# Patient Record
Sex: FEMALE | Race: WHITE | NOT HISPANIC OR LATINO | Employment: OTHER | URBAN - METROPOLITAN AREA
[De-identification: names, ages, dates, MRNs, and addresses within clinical notes are randomized per-mention and may not be internally consistent; named-entity substitution may affect disease eponyms.]

---

## 2023-09-19 ENCOUNTER — TRANSFERRED RECORDS (OUTPATIENT)
Dept: HEALTH INFORMATION MANAGEMENT | Facility: CLINIC | Age: 78
End: 2023-09-19
Payer: MEDICARE

## 2023-09-27 ENCOUNTER — HOSPITAL ENCOUNTER (INPATIENT)
Facility: CLINIC | Age: 78
Setting detail: SURGERY ADMIT
End: 2023-09-27
Attending: OBSTETRICS & GYNECOLOGY | Admitting: OBSTETRICS & GYNECOLOGY
Payer: MEDICARE

## 2023-09-27 PROBLEM — R19.00 PELVIC MASS: Status: ACTIVE | Noted: 2023-09-27

## 2023-09-27 PROBLEM — E11.9 DIABETES MELLITUS TYPE II, CONTROLLED (H): Status: ACTIVE | Noted: 2023-09-27

## 2023-09-27 PROBLEM — F41.9 ANXIETY: Status: ACTIVE | Noted: 2023-09-27

## 2023-09-27 PROBLEM — I10 HTN (HYPERTENSION): Status: ACTIVE | Noted: 2023-09-27

## 2023-09-27 RX ORDER — CEFAZOLIN SODIUM/WATER 2 G/20 ML
2 SYRINGE (ML) INTRAVENOUS SEE ADMIN INSTRUCTIONS
Status: CANCELLED | OUTPATIENT
Start: 2023-09-27

## 2023-09-27 RX ORDER — METRONIDAZOLE 500 MG/100ML
500 INJECTION, SOLUTION INTRAVENOUS ONCE
Status: CANCELLED | OUTPATIENT
Start: 2023-10-30

## 2023-09-27 RX ORDER — CEFAZOLIN SODIUM/WATER 2 G/20 ML
2 SYRINGE (ML) INTRAVENOUS
Status: CANCELLED | OUTPATIENT
Start: 2023-09-27

## 2023-09-27 RX ORDER — ACETAMINOPHEN 325 MG/1
975 TABLET ORAL ONCE
Status: CANCELLED | OUTPATIENT
Start: 2023-09-27 | End: 2023-09-27

## 2023-10-09 ENCOUNTER — MEDICAL CORRESPONDENCE (OUTPATIENT)
Dept: HEALTH INFORMATION MANAGEMENT | Facility: CLINIC | Age: 78
End: 2023-10-09
Payer: MEDICARE

## 2023-10-11 ENCOUNTER — HOSPITAL ENCOUNTER (OUTPATIENT)
Dept: ULTRASOUND IMAGING | Facility: CLINIC | Age: 78
Discharge: HOME | End: 2023-10-11
Attending: OBSTETRICS & GYNECOLOGY | Admitting: OBSTETRICS & GYNECOLOGY
Payer: MEDICARE

## 2023-10-11 DIAGNOSIS — J90 PLEURAL EFFUSION: ICD-10-CM

## 2023-10-11 PROCEDURE — 88189 FLOWCYTOMETRY/READ 16 & >: CPT | Mod: GC | Performed by: PATHOLOGY

## 2023-10-11 PROCEDURE — 88184 FLOWCYTOMETRY/ TC 1 MARKER: CPT | Performed by: OBSTETRICS & GYNECOLOGY

## 2023-10-11 PROCEDURE — 88341 IMHCHEM/IMCYTCHM EA ADD ANTB: CPT | Mod: TC | Performed by: OBSTETRICS & GYNECOLOGY

## 2023-10-11 PROCEDURE — 250N000009 HC RX 250: Performed by: RADIOLOGY

## 2023-10-11 PROCEDURE — 88185 FLOWCYTOMETRY/TC ADD-ON: CPT | Performed by: OBSTETRICS & GYNECOLOGY

## 2023-10-11 PROCEDURE — 88184 FLOWCYTOMETRY/ TC 1 MARKER: CPT | Performed by: PATHOLOGY

## 2023-10-11 PROCEDURE — 88342 IMHCHEM/IMCYTCHM 1ST ANTB: CPT | Mod: TC,XU | Performed by: OBSTETRICS & GYNECOLOGY

## 2023-10-11 PROCEDURE — 32555 ASPIRATE PLEURA W/ IMAGING: CPT

## 2023-10-11 PROCEDURE — 272N000706 US THORACENTESIS

## 2023-10-11 RX ORDER — LIDOCAINE HYDROCHLORIDE 10 MG/ML
10 INJECTION, SOLUTION EPIDURAL; INFILTRATION; INTRACAUDAL; PERINEURAL ONCE
Status: COMPLETED | OUTPATIENT
Start: 2023-10-11 | End: 2023-10-11

## 2023-10-11 RX ADMIN — LIDOCAINE HYDROCHLORIDE 10 ML: 10 INJECTION, SOLUTION EPIDURAL; INFILTRATION; INTRACAUDAL; PERINEURAL at 14:11

## 2023-10-13 PROCEDURE — 88112 CYTOPATH CELL ENHANCE TECH: CPT | Mod: 26

## 2023-10-13 PROCEDURE — 88341 IMHCHEM/IMCYTCHM EA ADD ANTB: CPT | Mod: 26

## 2023-10-13 PROCEDURE — 88305 TISSUE EXAM BY PATHOLOGIST: CPT | Mod: 26

## 2023-10-13 PROCEDURE — 88342 IMHCHEM/IMCYTCHM 1ST ANTB: CPT | Mod: 26

## 2023-10-16 LAB
PATH REPORT.COMMENTS IMP SPEC: NORMAL
PATH REPORT.FINAL DX SPEC: NORMAL
PATH REPORT.MICROSCOPIC SPEC OTHER STN: NORMAL
PATH REPORT.RELEVANT HX SPEC: NORMAL

## 2023-10-17 LAB
PATH REPORT.ADDENDUM SPEC: NORMAL
PATH REPORT.COMMENTS IMP SPEC: NO
PATH REPORT.COMMENTS IMP SPEC: NORMAL
PATH REPORT.COMMENTS IMP SPEC: NORMAL
PATH REPORT.FINAL DX SPEC: NORMAL
PATH REPORT.GROSS SPEC: NORMAL
PATH REPORT.MICROSCOPIC SPEC OTHER STN: NORMAL
PATH REPORT.RELEVANT HX SPEC: NORMAL

## 2023-10-23 ENCOUNTER — TRANSFERRED RECORDS (OUTPATIENT)
Dept: HEALTH INFORMATION MANAGEMENT | Facility: CLINIC | Age: 78
End: 2023-10-23

## 2023-11-17 ENCOUNTER — TRANSFERRED RECORDS (OUTPATIENT)
Dept: HEALTH INFORMATION MANAGEMENT | Facility: CLINIC | Age: 78
End: 2023-11-17
Payer: MEDICARE

## 2023-11-26 ENCOUNTER — ANESTHESIA EVENT (OUTPATIENT)
Dept: SURGERY | Facility: CLINIC | Age: 78
DRG: 742 | End: 2023-11-26
Payer: MEDICARE

## 2023-11-26 ASSESSMENT — COPD QUESTIONNAIRES: COPD: 1

## 2023-11-26 ASSESSMENT — LIFESTYLE VARIABLES: TOBACCO_USE: 1

## 2023-11-26 NOTE — ANESTHESIA PREPROCEDURE EVALUATION
Anesthesia Pre-Procedure Evaluation    Patient: Tammy Osorio   MRN: [id number] : 1945        Procedure : Procedure(s):  EXPLORATORY LAPAROTOMY , BILATERAL SALPINGO OOPHORECTOMY , POSSIBLE HYSTERECTOMY , POSSIBLE STAGING          Past Medical History:   Diagnosis Date    Anxiety     Aortic regurgitation     Ascending aortic aneurysm (H24)     Diabetes (H)     Hypertension     Osteopenia     Pelvic mass     Pulmonary emphysema (H)     Sciatica, unspecified laterality       No past surgical history on file.   Allergies   Allergen Reactions    Sulfa Antibiotics Swelling      Social History     Tobacco Use    Smoking status: Not on file    Smokeless tobacco: Not on file   Substance Use Topics    Alcohol use: Yes     Comment: occassional      Wt Readings from Last 1 Encounters:   No data found for Wt        Anesthesia Evaluation   Pt has had prior anesthetic.     No history of anesthetic complications       ROS/MED HX  ENT/Pulmonary: Comment: H/o supraglottic abscess and respiratory failure in     (+)                tobacco use, Past use,        COPD,              Neurologic: Comment: LBP - h/o sciatica      Cardiovascular: Comment: Ascending aortic aneurysm  H/o thoracoabdominal aortic aneurysm      2023 stress test  IMPRESSION    1. Adequate pharmacologic stress test with regadenoson and low level exercise.    2. The pharmacologic stress ECG was nondiagnostic due to nonspecific ST-T changes at baseline.    3. Left ventricular cavity size was normal (resting  ml).    4. Overall left ventricular systolic function was normal without wall motion abnormalities. The post stress LVEF was calculated to be 59 %.    5. Myocardial perfusion was normal.    6. There were no prior studies available for comparison.     2023 cardiac echo  Final Impressions:    1. Normal left ventricular size, mildly increased wall thickness, normal global systolic function, calculated EF of 62 %.    2. Mildly enlarged left  "atrium.    3. The aortic valve is trileaflet and sclerotic, mild stenosis and severe regurgitation. The aortic valve peak velocity is 2.1 m/s, the peak gradient is 18 mmHg, and the mean gradient is 8 mmHg. The aortic valve area is 1.58 cmÂ  with a dimensionless index of 0.63. The stroke volume index is 42.4 ml/mÂ .    4. The mitral valve is normal, mild to moderate mitral regurgitation.    5. The ascending aorta is dilated with a maximal diameter of 5.7 cm. The distal transverse aortic arch is 3.1 cm in diameter.     (+)  hypertension- -   -  - -                           valvular problems/murmurs type: AI and MR          METS/Exercise Tolerance:     Hematologic: Comments: Hgb 12.9      Musculoskeletal:       GI/Hepatic:       Renal/Genitourinary: Comment: H/o ovarian mass      Endo:     (+)  type II DM,                    Psychiatric/Substance Use:     (+) psychiatric history anxiety       Infectious Disease:       Malignancy:       Other:            Physical Exam    Airway  airway exam normal      Mallampati: II   TM distance: > 3 FB   Neck ROM: full   Mouth opening: > 3 cm    Respiratory Devices and Support         Dental       (+) Minor Abnormalities - some fillings, tiny chips      Cardiovascular          Rhythm and rate: regular and normal     Pulmonary           breath sounds clear to auscultation           OUTSIDE LABS:  CBC: No results found for: \"WBC\", \"HGB\", \"HCT\", \"PLT\"  BMP: No results found for: \"NA\", \"POTASSIUM\", \"CHLORIDE\", \"CO2\", \"BUN\", \"CR\", \"GLC\"  COAGS: No results found for: \"PTT\", \"INR\", \"FIBR\"  POC: No results found for: \"BGM\", \"HCG\", \"HCGS\"  HEPATIC: No results found for: \"ALBUMIN\", \"PROTTOTAL\", \"ALT\", \"AST\", \"GGT\", \"ALKPHOS\", \"BILITOTAL\", \"BILIDIRECT\", \"SEVEN\"  OTHER: No results found for: \"PH\", \"LACT\", \"A1C\", \"ARCHANA\", \"PHOS\", \"MAG\", \"LIPASE\", \"AMYLASE\", \"TSH\", \"T4\", \"T3\", \"CRP\", \"SED\"    Anesthesia Plan    ASA Status:  3    NPO Status:  NPO Appropriate    Anesthesia Type: General.     - " Airway: ETT   Induction: Intravenous, Propofol.   Maintenance: Balanced.   Techniques and Equipment:     - Airway: Video-Laryngoscope       Consents    Anesthesia Plan(s) and associated risks, benefits, and realistic alternatives discussed. Questions answered and patient/representative(s) expressed understanding.     - Discussed:     - Discussed with:  Patient            Postoperative Care    Pain management: Peripheral nerve block (Single Shot), IV analgesics.   PONV prophylaxis: Ondansetron (or other 5HT-3), Dexamethasone or Solumedrol     Comments:    Other Comments: TAP blocks requested by surgeon - patient consented for TAP blocks.            Harris Sanchez MD

## 2023-11-27 ENCOUNTER — ANESTHESIA (OUTPATIENT)
Dept: SURGERY | Facility: CLINIC | Age: 78
DRG: 742 | End: 2023-11-27
Payer: MEDICARE

## 2023-11-27 ENCOUNTER — HOSPITAL ENCOUNTER (INPATIENT)
Facility: CLINIC | Age: 78
LOS: 5 days | Discharge: HOME HEALTH | DRG: 742 | End: 2023-12-02
Attending: OBSTETRICS & GYNECOLOGY | Admitting: OBSTETRICS & GYNECOLOGY
Payer: MEDICARE

## 2023-11-27 DIAGNOSIS — R11.0 NAUSEA: ICD-10-CM

## 2023-11-27 DIAGNOSIS — N83.8 OVARIAN MASS, LEFT: Primary | ICD-10-CM

## 2023-11-27 DIAGNOSIS — E11.9 CONTROLLED TYPE 2 DIABETES MELLITUS WITHOUT COMPLICATION, WITHOUT LONG-TERM CURRENT USE OF INSULIN (H): ICD-10-CM

## 2023-11-27 DIAGNOSIS — K21.9 GASTROESOPHAGEAL REFLUX DISEASE WITHOUT ESOPHAGITIS: ICD-10-CM

## 2023-11-27 LAB
CREAT SERPL-MCNC: 0.78 MG/DL (ref 0.51–0.95)
EGFRCR SERPLBLD CKD-EPI 2021: 77 ML/MIN/1.73M2
GLUCOSE BLDC GLUCOMTR-MCNC: 152 MG/DL (ref 70–99)
GLUCOSE SERPL-MCNC: 149 MG/DL (ref 70–99)
POTASSIUM SERPL-SCNC: 4.5 MMOL/L (ref 3.4–5.3)

## 2023-11-27 PROCEDURE — 120N000001 HC R&B MED SURG/OB

## 2023-11-27 PROCEDURE — 82947 ASSAY GLUCOSE BLOOD QUANT: CPT | Performed by: ANESTHESIOLOGY

## 2023-11-27 PROCEDURE — 88112 CYTOPATH CELL ENHANCE TECH: CPT | Mod: TC | Performed by: OBSTETRICS & GYNECOLOGY

## 2023-11-27 PROCEDURE — 250N000009 HC RX 250: Performed by: NURSE ANESTHETIST, CERTIFIED REGISTERED

## 2023-11-27 PROCEDURE — 258N000003 HC RX IP 258 OP 636: Performed by: NURSE ANESTHETIST, CERTIFIED REGISTERED

## 2023-11-27 PROCEDURE — 999N000141 HC STATISTIC PRE-PROCEDURE NURSING ASSESSMENT: Performed by: OBSTETRICS & GYNECOLOGY

## 2023-11-27 PROCEDURE — 272N000001 HC OR GENERAL SUPPLY STERILE: Performed by: OBSTETRICS & GYNECOLOGY

## 2023-11-27 PROCEDURE — 258N000003 HC RX IP 258 OP 636: Performed by: ANESTHESIOLOGY

## 2023-11-27 PROCEDURE — 0UT70ZZ RESECTION OF BILATERAL FALLOPIAN TUBES, OPEN APPROACH: ICD-10-PCS | Performed by: OBSTETRICS & GYNECOLOGY

## 2023-11-27 PROCEDURE — 710N000009 HC RECOVERY PHASE 1, LEVEL 1, PER MIN: Performed by: OBSTETRICS & GYNECOLOGY

## 2023-11-27 PROCEDURE — 36415 COLL VENOUS BLD VENIPUNCTURE: CPT | Performed by: ANESTHESIOLOGY

## 2023-11-27 PROCEDURE — 250N000011 HC RX IP 250 OP 636: Performed by: PHYSICIAN ASSISTANT

## 2023-11-27 PROCEDURE — 370N000017 HC ANESTHESIA TECHNICAL FEE, PER MIN: Performed by: OBSTETRICS & GYNECOLOGY

## 2023-11-27 PROCEDURE — 250N000011 HC RX IP 250 OP 636: Mod: JZ | Performed by: OBSTETRICS & GYNECOLOGY

## 2023-11-27 PROCEDURE — 250N000013 HC RX MED GY IP 250 OP 250 PS 637: Performed by: PHYSICIAN ASSISTANT

## 2023-11-27 PROCEDURE — 250N000013 HC RX MED GY IP 250 OP 250 PS 637: Performed by: OBSTETRICS & GYNECOLOGY

## 2023-11-27 PROCEDURE — C1765 ADHESION BARRIER: HCPCS | Performed by: OBSTETRICS & GYNECOLOGY

## 2023-11-27 PROCEDURE — 3E1M38X IRRIGATION OF PERITONEAL CAVITY USING IRRIGATING SUBSTANCE, PERCUTANEOUS APPROACH, DIAGNOSTIC: ICD-10-PCS | Performed by: OBSTETRICS & GYNECOLOGY

## 2023-11-27 PROCEDURE — 258N000003 HC RX IP 258 OP 636: Performed by: PHYSICIAN ASSISTANT

## 2023-11-27 PROCEDURE — 250N000025 HC SEVOFLURANE, PER MIN: Performed by: OBSTETRICS & GYNECOLOGY

## 2023-11-27 PROCEDURE — 250N000011 HC RX IP 250 OP 636: Mod: JZ | Performed by: NURSE ANESTHETIST, CERTIFIED REGISTERED

## 2023-11-27 PROCEDURE — 250N000009 HC RX 250: Performed by: OBSTETRICS & GYNECOLOGY

## 2023-11-27 PROCEDURE — 250N000011 HC RX IP 250 OP 636: Mod: JZ | Performed by: ANESTHESIOLOGY

## 2023-11-27 PROCEDURE — 250N000011 HC RX IP 250 OP 636: Performed by: OBSTETRICS & GYNECOLOGY

## 2023-11-27 PROCEDURE — 88331 PATH CONSLTJ SURG 1 BLK 1SPC: CPT | Mod: TC | Performed by: OBSTETRICS & GYNECOLOGY

## 2023-11-27 PROCEDURE — 0UT20ZZ RESECTION OF BILATERAL OVARIES, OPEN APPROACH: ICD-10-PCS | Performed by: OBSTETRICS & GYNECOLOGY

## 2023-11-27 PROCEDURE — 84132 ASSAY OF SERUM POTASSIUM: CPT | Performed by: ANESTHESIOLOGY

## 2023-11-27 PROCEDURE — 88305 TISSUE EXAM BY PATHOLOGIST: CPT | Mod: TC | Performed by: OBSTETRICS & GYNECOLOGY

## 2023-11-27 PROCEDURE — 250N000011 HC RX IP 250 OP 636: Performed by: NURSE ANESTHETIST, CERTIFIED REGISTERED

## 2023-11-27 PROCEDURE — 88305 TISSUE EXAM BY PATHOLOGIST: CPT | Mod: 26 | Performed by: STUDENT IN AN ORGANIZED HEALTH CARE EDUCATION/TRAINING PROGRAM

## 2023-11-27 PROCEDURE — 82565 ASSAY OF CREATININE: CPT | Performed by: ANESTHESIOLOGY

## 2023-11-27 PROCEDURE — 88331 PATH CONSLTJ SURG 1 BLK 1SPC: CPT | Mod: 26 | Performed by: PATHOLOGY

## 2023-11-27 PROCEDURE — 360N000076 HC SURGERY LEVEL 3, PER MIN: Performed by: OBSTETRICS & GYNECOLOGY

## 2023-11-27 RX ORDER — AMOXICILLIN 250 MG
1 CAPSULE ORAL 2 TIMES DAILY
Status: DISCONTINUED | OUTPATIENT
Start: 2023-11-27 | End: 2023-12-02 | Stop reason: HOSPADM

## 2023-11-27 RX ORDER — ONDANSETRON 4 MG/1
4 TABLET, ORALLY DISINTEGRATING ORAL EVERY 30 MIN PRN
Status: DISCONTINUED | OUTPATIENT
Start: 2023-11-27 | End: 2023-11-27 | Stop reason: HOSPADM

## 2023-11-27 RX ORDER — PROPOFOL 10 MG/ML
INJECTION, EMULSION INTRAVENOUS PRN
Status: DISCONTINUED | OUTPATIENT
Start: 2023-11-27 | End: 2023-11-27

## 2023-11-27 RX ORDER — METFORMIN HCL 500 MG
1000 TABLET, EXTENDED RELEASE 24 HR ORAL 2 TIMES DAILY WITH MEALS
Status: ON HOLD | COMMUNITY
End: 2023-12-02

## 2023-11-27 RX ORDER — NALOXONE HYDROCHLORIDE 0.4 MG/ML
0.2 INJECTION, SOLUTION INTRAMUSCULAR; INTRAVENOUS; SUBCUTANEOUS
Status: DISCONTINUED | OUTPATIENT
Start: 2023-11-27 | End: 2023-12-02 | Stop reason: HOSPADM

## 2023-11-27 RX ORDER — NICOTINE POLACRILEX 4 MG
15-30 LOZENGE BUCCAL
Status: DISCONTINUED | OUTPATIENT
Start: 2023-11-27 | End: 2023-12-02 | Stop reason: HOSPADM

## 2023-11-27 RX ORDER — LIDOCAINE HYDROCHLORIDE 20 MG/ML
INJECTION, SOLUTION INFILTRATION; PERINEURAL PRN
Status: DISCONTINUED | OUTPATIENT
Start: 2023-11-27 | End: 2023-11-27

## 2023-11-27 RX ORDER — ONDANSETRON 4 MG/1
4 TABLET, ORALLY DISINTEGRATING ORAL EVERY 6 HOURS PRN
Status: DISCONTINUED | OUTPATIENT
Start: 2023-11-27 | End: 2023-12-02 | Stop reason: HOSPADM

## 2023-11-27 RX ORDER — ACETAMINOPHEN 325 MG/1
975 TABLET ORAL EVERY 6 HOURS
Qty: 36 TABLET | Refills: 0 | Status: COMPLETED | OUTPATIENT
Start: 2023-11-28 | End: 2023-11-30

## 2023-11-27 RX ORDER — ONDANSETRON 2 MG/ML
INJECTION INTRAMUSCULAR; INTRAVENOUS PRN
Status: DISCONTINUED | OUTPATIENT
Start: 2023-11-27 | End: 2023-11-27

## 2023-11-27 RX ORDER — ONDANSETRON 2 MG/ML
4 INJECTION INTRAMUSCULAR; INTRAVENOUS EVERY 30 MIN PRN
Status: DISCONTINUED | OUTPATIENT
Start: 2023-11-27 | End: 2023-11-27 | Stop reason: HOSPADM

## 2023-11-27 RX ORDER — METOPROLOL TARTRATE 50 MG
50 TABLET ORAL 2 TIMES DAILY
Status: DISCONTINUED | OUTPATIENT
Start: 2023-11-28 | End: 2023-12-02 | Stop reason: HOSPADM

## 2023-11-27 RX ORDER — PROPOFOL 10 MG/ML
INJECTION, EMULSION INTRAVENOUS CONTINUOUS PRN
Status: DISCONTINUED | OUTPATIENT
Start: 2023-11-27 | End: 2023-11-27

## 2023-11-27 RX ORDER — ENOXAPARIN SODIUM 100 MG/ML
40 INJECTION SUBCUTANEOUS EVERY 24 HOURS
Status: DISCONTINUED | OUTPATIENT
Start: 2023-11-28 | End: 2023-12-01

## 2023-11-27 RX ORDER — SODIUM CHLORIDE, SODIUM LACTATE, POTASSIUM CHLORIDE, CALCIUM CHLORIDE 600; 310; 30; 20 MG/100ML; MG/100ML; MG/100ML; MG/100ML
INJECTION, SOLUTION INTRAVENOUS CONTINUOUS
Status: DISCONTINUED | OUTPATIENT
Start: 2023-11-27 | End: 2023-11-27 | Stop reason: HOSPADM

## 2023-11-27 RX ORDER — HYDROMORPHONE HCL IN WATER/PF 6 MG/30 ML
0.2 PATIENT CONTROLLED ANALGESIA SYRINGE INTRAVENOUS EVERY 5 MIN PRN
Status: DISCONTINUED | OUTPATIENT
Start: 2023-11-27 | End: 2023-11-27 | Stop reason: HOSPADM

## 2023-11-27 RX ORDER — AMLODIPINE BESYLATE 5 MG/1
1 TABLET ORAL
Status: ON HOLD | COMMUNITY
End: 2023-11-27

## 2023-11-27 RX ORDER — MAGNESIUM HYDROXIDE 1200 MG/15ML
LIQUID ORAL PRN
Status: DISCONTINUED | OUTPATIENT
Start: 2023-11-27 | End: 2023-11-27 | Stop reason: HOSPADM

## 2023-11-27 RX ORDER — LIDOCAINE 40 MG/G
CREAM TOPICAL
Status: DISCONTINUED | OUTPATIENT
Start: 2023-11-27 | End: 2023-12-02 | Stop reason: HOSPADM

## 2023-11-27 RX ORDER — CEFAZOLIN SODIUM 1 G/3ML
1 INJECTION, POWDER, FOR SOLUTION INTRAMUSCULAR; INTRAVENOUS EVERY 8 HOURS
Qty: 10 ML | Refills: 0 | Status: COMPLETED | OUTPATIENT
Start: 2023-11-27 | End: 2023-11-28

## 2023-11-27 RX ORDER — SODIUM CHLORIDE 9 MG/ML
INJECTION, SOLUTION INTRAVENOUS CONTINUOUS
Status: DISCONTINUED | OUTPATIENT
Start: 2023-11-27 | End: 2023-11-29

## 2023-11-27 RX ORDER — METRONIDAZOLE 500 MG/100ML
500 INJECTION, SOLUTION INTRAVENOUS EVERY 12 HOURS
Qty: 200 ML | Refills: 0 | Status: COMPLETED | OUTPATIENT
Start: 2023-11-28 | End: 2023-11-28

## 2023-11-27 RX ORDER — NALOXONE HYDROCHLORIDE 0.4 MG/ML
0.4 INJECTION, SOLUTION INTRAMUSCULAR; INTRAVENOUS; SUBCUTANEOUS
Status: DISCONTINUED | OUTPATIENT
Start: 2023-11-27 | End: 2023-12-02 | Stop reason: HOSPADM

## 2023-11-27 RX ORDER — KETOROLAC TROMETHAMINE 15 MG/ML
15 INJECTION, SOLUTION INTRAMUSCULAR; INTRAVENOUS EVERY 6 HOURS
Qty: 20 ML | Refills: 0 | Status: DISCONTINUED | OUTPATIENT
Start: 2023-11-27 | End: 2023-12-01

## 2023-11-27 RX ORDER — FENTANYL CITRATE 50 UG/ML
INJECTION, SOLUTION INTRAMUSCULAR; INTRAVENOUS PRN
Status: DISCONTINUED | OUTPATIENT
Start: 2023-11-27 | End: 2023-11-27

## 2023-11-27 RX ORDER — LISINOPRIL 10 MG/1
10 TABLET ORAL DAILY
Status: ON HOLD | COMMUNITY
End: 2023-11-27

## 2023-11-27 RX ORDER — DEXTROSE MONOHYDRATE 25 G/50ML
25-50 INJECTION, SOLUTION INTRAVENOUS
Status: DISCONTINUED | OUTPATIENT
Start: 2023-11-27 | End: 2023-12-02 | Stop reason: HOSPADM

## 2023-11-27 RX ORDER — LISINOPRIL 20 MG/1
20 TABLET ORAL EVERY EVENING
Status: ON HOLD | COMMUNITY
End: 2023-12-28

## 2023-11-27 RX ORDER — GLYCOPYRROLATE 0.2 MG/ML
INJECTION, SOLUTION INTRAMUSCULAR; INTRAVENOUS PRN
Status: DISCONTINUED | OUTPATIENT
Start: 2023-11-27 | End: 2023-11-27

## 2023-11-27 RX ORDER — CEFAZOLIN SODIUM/WATER 2 G/20 ML
2 SYRINGE (ML) INTRAVENOUS SEE ADMIN INSTRUCTIONS
Status: DISCONTINUED | OUTPATIENT
Start: 2023-11-27 | End: 2023-11-27 | Stop reason: HOSPADM

## 2023-11-27 RX ORDER — ONDANSETRON 2 MG/ML
4 INJECTION INTRAMUSCULAR; INTRAVENOUS EVERY 6 HOURS PRN
Status: DISCONTINUED | OUTPATIENT
Start: 2023-11-27 | End: 2023-12-02 | Stop reason: HOSPADM

## 2023-11-27 RX ORDER — CEFAZOLIN SODIUM/WATER 2 G/20 ML
2 SYRINGE (ML) INTRAVENOUS
Status: COMPLETED | OUTPATIENT
Start: 2023-11-27 | End: 2023-11-27

## 2023-11-27 RX ORDER — HYDROMORPHONE HCL IN WATER/PF 6 MG/30 ML
0.4 PATIENT CONTROLLED ANALGESIA SYRINGE INTRAVENOUS
Status: DISCONTINUED | OUTPATIENT
Start: 2023-11-27 | End: 2023-12-02 | Stop reason: HOSPADM

## 2023-11-27 RX ORDER — CALCIUM CARBONATE 500 MG/1
500 TABLET, CHEWABLE ORAL 4 TIMES DAILY PRN
Status: DISCONTINUED | OUTPATIENT
Start: 2023-11-27 | End: 2023-12-02 | Stop reason: HOSPADM

## 2023-11-27 RX ORDER — ACETAMINOPHEN 325 MG/1
650 TABLET ORAL EVERY 6 HOURS
Status: DISCONTINUED | OUTPATIENT
Start: 2023-12-01 | End: 2023-12-02 | Stop reason: HOSPADM

## 2023-11-27 RX ORDER — AMLODIPINE BESYLATE 5 MG/1
5 TABLET ORAL DAILY
Status: DISCONTINUED | OUTPATIENT
Start: 2023-11-28 | End: 2023-11-28

## 2023-11-27 RX ORDER — PROCHLORPERAZINE MALEATE 5 MG
5 TABLET ORAL EVERY 6 HOURS PRN
Status: DISCONTINUED | OUTPATIENT
Start: 2023-11-27 | End: 2023-12-02 | Stop reason: HOSPADM

## 2023-11-27 RX ORDER — LISINOPRIL 10 MG/1
10 TABLET ORAL EVERY EVENING
Status: DISCONTINUED | OUTPATIENT
Start: 2023-11-28 | End: 2023-11-28

## 2023-11-27 RX ORDER — METRONIDAZOLE 500 MG/100ML
500 INJECTION, SOLUTION INTRAVENOUS ONCE
Status: COMPLETED | OUTPATIENT
Start: 2023-11-27 | End: 2023-11-27

## 2023-11-27 RX ORDER — HYDRALAZINE HYDROCHLORIDE 20 MG/ML
10 INJECTION INTRAMUSCULAR; INTRAVENOUS ONCE
Status: COMPLETED | OUTPATIENT
Start: 2023-11-27 | End: 2023-11-27

## 2023-11-27 RX ORDER — FENTANYL CITRATE 0.05 MG/ML
25 INJECTION, SOLUTION INTRAMUSCULAR; INTRAVENOUS EVERY 5 MIN PRN
Status: DISCONTINUED | OUTPATIENT
Start: 2023-11-27 | End: 2023-11-27 | Stop reason: HOSPADM

## 2023-11-27 RX ORDER — FENTANYL CITRATE 0.05 MG/ML
50 INJECTION, SOLUTION INTRAMUSCULAR; INTRAVENOUS EVERY 5 MIN PRN
Status: DISCONTINUED | OUTPATIENT
Start: 2023-11-27 | End: 2023-11-27 | Stop reason: HOSPADM

## 2023-11-27 RX ORDER — HYDROMORPHONE HCL IN WATER/PF 6 MG/30 ML
0.4 PATIENT CONTROLLED ANALGESIA SYRINGE INTRAVENOUS EVERY 5 MIN PRN
Status: DISCONTINUED | OUTPATIENT
Start: 2023-11-27 | End: 2023-11-27 | Stop reason: HOSPADM

## 2023-11-27 RX ORDER — AMLODIPINE BESYLATE 10 MG/1
10 TABLET ORAL DAILY
COMMUNITY

## 2023-11-27 RX ORDER — METOPROLOL TARTRATE 50 MG
50 TABLET ORAL DAILY
COMMUNITY
Start: 2022-05-03

## 2023-11-27 RX ORDER — ACETAMINOPHEN 325 MG/1
975 TABLET ORAL ONCE
Status: COMPLETED | OUTPATIENT
Start: 2023-11-27 | End: 2023-11-27

## 2023-11-27 RX ORDER — HYDROMORPHONE HCL IN WATER/PF 6 MG/30 ML
0.2 PATIENT CONTROLLED ANALGESIA SYRINGE INTRAVENOUS
Status: DISCONTINUED | OUTPATIENT
Start: 2023-11-27 | End: 2023-12-02 | Stop reason: HOSPADM

## 2023-11-27 RX ADMIN — FENTANYL CITRATE 50 MCG: 50 INJECTION INTRAMUSCULAR; INTRAVENOUS at 15:51

## 2023-11-27 RX ADMIN — PHENYLEPHRINE HYDROCHLORIDE 100 MCG: 10 INJECTION INTRAVENOUS at 15:26

## 2023-11-27 RX ADMIN — FENTANYL CITRATE 50 MCG: 50 INJECTION INTRAMUSCULAR; INTRAVENOUS at 15:20

## 2023-11-27 RX ADMIN — HYDRALAZINE HYDROCHLORIDE 10 MG: 20 INJECTION INTRAMUSCULAR; INTRAVENOUS at 17:13

## 2023-11-27 RX ADMIN — CEFAZOLIN 1 G: 1 INJECTION, POWDER, FOR SOLUTION INTRAMUSCULAR; INTRAVENOUS at 23:00

## 2023-11-27 RX ADMIN — PROPOFOL 100 MG: 10 INJECTION, EMULSION INTRAVENOUS at 15:21

## 2023-11-27 RX ADMIN — GLYCOPYRROLATE 0.2 MG: 0.2 INJECTION, SOLUTION INTRAMUSCULAR; INTRAVENOUS at 15:26

## 2023-11-27 RX ADMIN — SODIUM CHLORIDE, POTASSIUM CHLORIDE, SODIUM LACTATE AND CALCIUM CHLORIDE: 600; 310; 30; 20 INJECTION, SOLUTION INTRAVENOUS at 13:48

## 2023-11-27 RX ADMIN — SODIUM CHLORIDE: 9 INJECTION, SOLUTION INTRAVENOUS at 18:23

## 2023-11-27 RX ADMIN — PHENYLEPHRINE HYDROCHLORIDE 100 MCG: 10 INJECTION INTRAVENOUS at 15:24

## 2023-11-27 RX ADMIN — ACETAMINOPHEN 975 MG: 325 TABLET, FILM COATED ORAL at 12:55

## 2023-11-27 RX ADMIN — ROCURONIUM BROMIDE 20 MG: 50 INJECTION, SOLUTION INTRAVENOUS at 15:41

## 2023-11-27 RX ADMIN — GLYCOPYRROLATE 0.2 MG: 0.2 INJECTION, SOLUTION INTRAMUSCULAR; INTRAVENOUS at 15:24

## 2023-11-27 RX ADMIN — Medication 2 G: at 15:12

## 2023-11-27 RX ADMIN — IBUPROFEN 600 MG: 400 TABLET ORAL at 21:36

## 2023-11-27 RX ADMIN — LIDOCAINE HYDROCHLORIDE 80 MG: 20 INJECTION, SOLUTION INFILTRATION; PERINEURAL at 15:21

## 2023-11-27 RX ADMIN — METRONIDAZOLE 500 MG: 500 INJECTION, SOLUTION INTRAVENOUS at 13:48

## 2023-11-27 RX ADMIN — PROPOFOL 50 MCG/KG/MIN: 10 INJECTION, EMULSION INTRAVENOUS at 15:21

## 2023-11-27 RX ADMIN — SUGAMMADEX 100 MG: 100 INJECTION, SOLUTION INTRAVENOUS at 16:45

## 2023-11-27 RX ADMIN — ROCURONIUM BROMIDE 30 MG: 50 INJECTION, SOLUTION INTRAVENOUS at 15:21

## 2023-11-27 RX ADMIN — PHENYLEPHRINE HYDROCHLORIDE 100 MCG: 10 INJECTION INTRAVENOUS at 15:35

## 2023-11-27 RX ADMIN — DOCUSATE SODIUM 50 MG AND SENNOSIDES 8.6 MG 1 TABLET: 8.6; 5 TABLET, FILM COATED ORAL at 21:36

## 2023-11-27 RX ADMIN — HYDROMORPHONE HYDROCHLORIDE 0.2 MG: 0.2 INJECTION, SOLUTION INTRAMUSCULAR; INTRAVENOUS; SUBCUTANEOUS at 19:04

## 2023-11-27 RX ADMIN — SODIUM CHLORIDE, POTASSIUM CHLORIDE, SODIUM LACTATE AND CALCIUM CHLORIDE: 600; 310; 30; 20 INJECTION, SOLUTION INTRAVENOUS at 15:10

## 2023-11-27 RX ADMIN — ONDANSETRON 4 MG: 2 INJECTION INTRAMUSCULAR; INTRAVENOUS at 16:24

## 2023-11-27 ASSESSMENT — ACTIVITIES OF DAILY LIVING (ADL)
ADLS_ACUITY_SCORE: 18
ADLS_ACUITY_SCORE: 35
ADLS_ACUITY_SCORE: 20
ADLS_ACUITY_SCORE: 18

## 2023-11-27 NOTE — ANESTHESIA POSTPROCEDURE EVALUATION
Patient: Tammy Osorio    Procedure: Procedure(s):  EXPLORATORY LAPAROTOMY , BILATERAL SALPINGO OOPHORECTOMY , PELVIC WASHING       Anesthesia Type:  General    Note:     Postop Pain Control: Uneventful            Sign Out: Well controlled pain   PONV: No   Neuro/Psych: Uneventful            Sign Out: Acceptable/Baseline neuro status   Airway/Respiratory: Uneventful            Sign Out: Acceptable/Baseline resp. status   CV/Hemodynamics: Uneventful            Sign Out: Acceptable CV status; No obvious hypovolemia; No obvious fluid overload   Other NRE: NONE   DID A NON-ROUTINE EVENT OCCUR?            Last vitals:  Vitals Value Taken Time   /57 11/27/23 1745   Temp 36.9  C (98.4  F) 11/27/23 1745   Pulse 79 11/27/23 1752   Resp 25 11/27/23 1752   SpO2 95 % 11/27/23 1752   Vitals shown include unfiled device data.    Electronically Signed By: Mina Baker DO, DO  November 27, 2023  5:55 PM

## 2023-11-27 NOTE — PROCEDURES
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   Procedure: Procedure(s):  EXPLORATORY LAPAROTOMY , BILATERAL SALPINGO OOPHORECTOMY , PELVIC WASHING     Surgeon(s):  Assistant(s):     Anesthesia: Surgeon(s) and Role:     * Naye Scruggs MD - Primary     * Aliza Tellez PA-C - Assisting    General   Estimated blood loss: * No values recorded between 11/27/2023  3:42 PM and 11/27/2023  4:52 PM *    Specimens:  Indications for Procedure: ID Type Source Tests Collected by Time Destination   1 : LEFT OVARY AND FALLOPIAN TUBE Tissue Ovary and Fallopian Tube, Left SURGICAL PATHOLOGY EXAM Naye Scruggs MD 11/27/2023  3:48 PM    2 : RIGHT OVARY AND FALLOPIAN TUBE Tissue Ovary and Fallopian Tube, Right SURGICAL PATHOLOGY EXAM Naye Scruggs MD 11/27/2023  3:53 PM    3 : PELVIC WASHINGS Washings Pelvis NON-GYNECOLOGIC CYTOLOGY Naye Scruggs MD 11/27/2023  3:59 PM         Findings: Large multiloculated tumor of the left ovary with no external excresences.  Frozen section c/w mucinous neoplasm with component of javier tumor - diagnosis deferred to permanent pathology.           Description of procedure:   The patient received broad-spectrum prophylactic antibiotics in the preop area and pneumatic compression stockings were placed on her lower extremities.  She was brought to the operating room and placed in a supine position on the operating room table.  General endotracheal anesthesia was administered in the usual fashion.  Once intubated, she was repositioned in low lithotomy position using well-padded Tristen stirrups.  Her arms were well padded and left on arm boards below her shoulder level.  She was prepped and draped in the usual sterile fashion including insertion of a 16 Serbian Andres catheter into her bladder.  A timeout was conducted  and everyone agreed upon the procedure.  I made a midline vertical incision with a #10 scalpel blade going around the right side of the umbilicus and extending the incision into the infra epigastric area.  This was taken down through a small amount of subcutaneous tissue and fascia with the electrocautery.  The peritoneal cavity was easily opened and the incision was extended superiorly and inferiorly.  She had a predominantly white capsule tumor coming from the left adnexal area.  It was easily moved out of the incision onto the abdominal wall.  I created a space underneath the ovarian vessels.  They were cauterized and divided with the LigaSure device.  We then undercut the peritoneum below the mass towards the uterus.  The proximal fallopian tube and utero-ovarian ligament were also divided with and cauterized with the LigaSure device.  The mass was passed off the table and sent to pathology for frozen section.  On the right side we elevated the right round ligament with a Allis clamp.  We opened up the posterior broad ligament lateral to the ovarian vessels.  I isolated the ovarian vessels at the pelvic brim and undercut the peritoneum towards the uterus.  The ovarian vessels were cauterized and divided with the LigaSure device on the side as were the proximal fallopian tube and utero-ovarian ligament.  The right tube and ovary were passed off the table.  We did suction out a small amount of fluid in the cul-de-sac and then added saline to the pelvis and suction this as well as washings.  We awaited frozen section analysis.  The pathologist felt that this represented a mucinous neoplasm with a component of Javier tumor.  In the representative sample he found no high-grade malignancy present.  I did walk through all of the components of the bowel and found no evidence of bowel neoplasia.  She did have a large lower abdominal aortic aneurysm.  We placed Seprafilm in the pelvis and over the bowel underneath the  incision.  We closed the incision in a mass closure using #1 looped PDS from superior and inferior aspects of the incision and we did a running closure towards the midline where each suture was tied to itself.  The skin was reapproximated with staples.  An island dressing was placed over the incision.  Anesthesia performed a TAP block.  Her anesthesia was reversed, she was extubated, and taken to the recovery room in stable condition.  Estimated blood loss 10 mL    Hui Tellez PA-C was my primary assist for the case she helped with opening and closing of the abdomen.  She also helped with the excision of the adnexal structures.    Naye Scrgugs MD

## 2023-11-27 NOTE — ANESTHESIA CARE TRANSFER NOTE
Patient: aTmmy Osorio    Procedure: Procedure(s):  EXPLORATORY LAPAROTOMY , BILATERAL SALPINGO OOPHORECTOMY , PELVIC WASHING       Diagnosis: Pelvic mass [R19.00]  Diagnosis Additional Information: No value filed.    Anesthesia Type:   General     Note:    Oropharynx: oropharynx clear of all foreign objects  Level of Consciousness: awake  Oxygen Supplementation: face mask  Level of Supplemental Oxygen (L/min / FiO2): 10  Independent Airway: airway patency satisfactory and stable  Dentition: dentition unchanged  Vital Signs Stable: post-procedure vital signs reviewed and stable  Report to RN Given: handoff report given  Patient transferred to: PACU    Handoff Report: Identifed the Patient, Identified the Reponsible Provider, Reviewed the pertinent medical history, Discussed the surgical course, Reviewed Intra-OP anesthesia mangement and issues during anesthesia, Set expectations for post-procedure period and Allowed opportunity for questions and acknowledgement of understanding      Vitals:  Vitals Value Taken Time   BP     Temp     Pulse 65 11/27/23 1657   Resp 17 11/27/23 1657   SpO2 99 % 11/27/23 1657   Vitals shown include unfiled device data.    Electronically Signed By: BRIDGER Davis CRNA  November 27, 2023  4:58 PM

## 2023-11-27 NOTE — ANESTHESIA PROCEDURE NOTES
Airway       Patient location during procedure: OR       Procedure Start/Stop Times: 11/27/2023 3:23 PM  Staff -        Anesthesiologist:  Harris Sanchez MD       CRNA: Silas Harmon APRN CRNA       Performed By: CRNAIndications and Patient Condition       Indications for airway management: ariane-procedural       Induction type:intravenous       Mask difficulty assessment: 1 - vent by mask    Final Airway Details       Final airway type: endotracheal airway       Successful airway: ETT - single  Endotracheal Airway Details        ETT size (mm): 7.0       Successful intubation technique: video laryngoscopy       VL Blade Size: Glidescope 3       Grade View of Cords: 1       Adjucts: stylet       Position: Center       Secured at (cm): 22    Post intubation assessment        Placement verified by: capnometry, equal breath sounds and chest rise        Number of attempts at approach: 1       Secured with: tape       Ease of procedure: easy       Dentition: Intact    Medication(s) Administered   Medication Administration Time: 11/27/2023 3:23 PM

## 2023-11-27 NOTE — BRIEF OP NOTE
[facility]    Brief Operative Note    Pre-operative diagnosis: Pelvic mass [R19.00]  Post-operative diagnosis Left ovarian mass    Procedure: EXPLORATORY LAPAROTOMY , BILATERAL SALPINGO OOPHORECTOMY , PELVIC WASHING, Bilateral - Abdomen    Surgeon: Surgeon(s) and Role:     * Naye Scruggs MD - Primary     * Aliza Tellez PA-C - Assisting  Anesthesia: General   Estimated Blood Loss: Less than 10 ml    Drains: None  Specimens:   ID Type Source Tests Collected by Time Destination   1 : LEFT OVARY AND FALLOPIAN TUBE Tissue Ovary and Fallopian Tube, Left SURGICAL PATHOLOGY EXAM Naye Scruggs MD 11/27/2023  3:48 PM    2 : RIGHT OVARY AND FALLOPIAN TUBE Tissue Ovary and Fallopian Tube, Right SURGICAL PATHOLOGY EXAM Naye Scruggs MD 11/27/2023  3:53 PM    3 : PELVIC WASHINGS Washings Pelvis NON-GYNECOLOGIC CYTOLOGY Naye Scruggs MD 11/27/2023  3:59 PM      Findings:   Large cystic left ovarian mass, no evidence of metastatic disease, small and large bowel without abnormality.  .  Complications: None.  Implants: * No implants in log *

## 2023-11-28 LAB
ANION GAP SERPL CALCULATED.3IONS-SCNC: 9 MMOL/L (ref 7–15)
BUN SERPL-MCNC: 18.3 MG/DL (ref 8–23)
CALCIUM SERPL-MCNC: 8.7 MG/DL (ref 8.8–10.2)
CHLORIDE SERPL-SCNC: 101 MMOL/L (ref 98–107)
CREAT SERPL-MCNC: 0.75 MG/DL (ref 0.51–0.95)
DEPRECATED HCO3 PLAS-SCNC: 24 MMOL/L (ref 22–29)
EGFRCR SERPLBLD CKD-EPI 2021: 81 ML/MIN/1.73M2
ERYTHROCYTE [DISTWIDTH] IN BLOOD BY AUTOMATED COUNT: 13.6 % (ref 10–15)
GLUCOSE BLDC GLUCOMTR-MCNC: 132 MG/DL (ref 70–99)
GLUCOSE BLDC GLUCOMTR-MCNC: 138 MG/DL (ref 70–99)
GLUCOSE BLDC GLUCOMTR-MCNC: 154 MG/DL (ref 70–99)
GLUCOSE BLDC GLUCOMTR-MCNC: 165 MG/DL (ref 70–99)
GLUCOSE SERPL-MCNC: 146 MG/DL (ref 70–99)
HCT VFR BLD AUTO: 38.2 % (ref 35–47)
HGB BLD-MCNC: 12.5 G/DL (ref 11.7–15.7)
MCH RBC QN AUTO: 30.6 PG (ref 26.5–33)
MCHC RBC AUTO-ENTMCNC: 32.7 G/DL (ref 31.5–36.5)
MCV RBC AUTO: 93 FL (ref 78–100)
PLATELET # BLD AUTO: 253 10E3/UL (ref 150–450)
POTASSIUM SERPL-SCNC: 4.5 MMOL/L (ref 3.4–5.3)
RBC # BLD AUTO: 4.09 10E6/UL (ref 3.8–5.2)
SODIUM SERPL-SCNC: 134 MMOL/L (ref 135–145)
WBC # BLD AUTO: 9.1 10E3/UL (ref 4–11)

## 2023-11-28 PROCEDURE — 99222 1ST HOSP IP/OBS MODERATE 55: CPT | Mod: AI | Performed by: INTERNAL MEDICINE

## 2023-11-28 PROCEDURE — 250N000012 HC RX MED GY IP 250 OP 636 PS 637: Performed by: PHYSICIAN ASSISTANT

## 2023-11-28 PROCEDURE — 82435 ASSAY OF BLOOD CHLORIDE: CPT | Performed by: PHYSICIAN ASSISTANT

## 2023-11-28 PROCEDURE — 258N000003 HC RX IP 258 OP 636: Performed by: PHYSICIAN ASSISTANT

## 2023-11-28 PROCEDURE — 250N000011 HC RX IP 250 OP 636: Mod: JZ | Performed by: PHYSICIAN ASSISTANT

## 2023-11-28 PROCEDURE — 36415 COLL VENOUS BLD VENIPUNCTURE: CPT | Performed by: PHYSICIAN ASSISTANT

## 2023-11-28 PROCEDURE — 250N000013 HC RX MED GY IP 250 OP 250 PS 637: Performed by: PHYSICIAN ASSISTANT

## 2023-11-28 PROCEDURE — 85027 COMPLETE CBC AUTOMATED: CPT | Performed by: PHYSICIAN ASSISTANT

## 2023-11-28 PROCEDURE — 258N000003 HC RX IP 258 OP 636: Performed by: OBSTETRICS & GYNECOLOGY

## 2023-11-28 PROCEDURE — 120N000001 HC R&B MED SURG/OB

## 2023-11-28 PROCEDURE — 250N000013 HC RX MED GY IP 250 OP 250 PS 637: Performed by: OBSTETRICS & GYNECOLOGY

## 2023-11-28 RX ORDER — LISINOPRIL 20 MG/1
20 TABLET ORAL EVERY EVENING
Status: DISCONTINUED | OUTPATIENT
Start: 2023-11-28 | End: 2023-12-02 | Stop reason: HOSPADM

## 2023-11-28 RX ORDER — AMLODIPINE BESYLATE 5 MG/1
10 TABLET ORAL DAILY
Status: DISCONTINUED | OUTPATIENT
Start: 2023-11-29 | End: 2023-12-02 | Stop reason: HOSPADM

## 2023-11-28 RX ORDER — TRAMADOL HYDROCHLORIDE 50 MG/1
25-50 TABLET ORAL EVERY 6 HOURS PRN
Qty: 15 TABLET | Refills: 0 | Status: SHIPPED | OUTPATIENT
Start: 2023-11-28

## 2023-11-28 RX ORDER — CLONIDINE HYDROCHLORIDE 0.1 MG/1
0.1 TABLET ORAL EVERY 6 HOURS PRN
Status: DISCONTINUED | OUTPATIENT
Start: 2023-11-28 | End: 2023-12-02 | Stop reason: HOSPADM

## 2023-11-28 RX ADMIN — ENOXAPARIN SODIUM 40 MG: 40 INJECTION SUBCUTANEOUS at 10:18

## 2023-11-28 RX ADMIN — IBUPROFEN 600 MG: 400 TABLET ORAL at 03:37

## 2023-11-28 RX ADMIN — LISINOPRIL 20 MG: 20 TABLET ORAL at 20:03

## 2023-11-28 RX ADMIN — AMLODIPINE BESYLATE 5 MG: 5 TABLET ORAL at 08:04

## 2023-11-28 RX ADMIN — METOPROLOL TARTRATE 50 MG: 50 TABLET, FILM COATED ORAL at 08:04

## 2023-11-28 RX ADMIN — SODIUM CHLORIDE: 9 INJECTION, SOLUTION INTRAVENOUS at 22:29

## 2023-11-28 RX ADMIN — METOPROLOL TARTRATE 50 MG: 50 TABLET, FILM COATED ORAL at 21:29

## 2023-11-28 RX ADMIN — CEFAZOLIN 1 G: 1 INJECTION, POWDER, FOR SOLUTION INTRAMUSCULAR; INTRAVENOUS at 07:06

## 2023-11-28 RX ADMIN — INSULIN ASPART 1 UNITS: 100 INJECTION, SOLUTION INTRAVENOUS; SUBCUTANEOUS at 13:19

## 2023-11-28 RX ADMIN — SERTRALINE HYDROCHLORIDE 50 MG: 50 TABLET ORAL at 20:03

## 2023-11-28 RX ADMIN — ACETAMINOPHEN 975 MG: 325 TABLET, FILM COATED ORAL at 12:25

## 2023-11-28 RX ADMIN — SODIUM CHLORIDE: 9 INJECTION, SOLUTION INTRAVENOUS at 13:22

## 2023-11-28 RX ADMIN — CALCIUM CARBONATE (ANTACID) CHEW TAB 500 MG 500 MG: 500 CHEW TAB at 21:29

## 2023-11-28 RX ADMIN — IBUPROFEN 600 MG: 400 TABLET ORAL at 21:29

## 2023-11-28 RX ADMIN — DOCUSATE SODIUM 50 MG AND SENNOSIDES 8.6 MG 1 TABLET: 8.6; 5 TABLET, FILM COATED ORAL at 08:04

## 2023-11-28 RX ADMIN — SODIUM CHLORIDE 500 ML: 9 INJECTION, SOLUTION INTRAVENOUS at 10:44

## 2023-11-28 RX ADMIN — ACETAMINOPHEN 975 MG: 325 TABLET, FILM COATED ORAL at 06:14

## 2023-11-28 RX ADMIN — SODIUM CHLORIDE: 9 INJECTION, SOLUTION INTRAVENOUS at 02:58

## 2023-11-28 RX ADMIN — IBUPROFEN 600 MG: 400 TABLET ORAL at 08:03

## 2023-11-28 RX ADMIN — ACETAMINOPHEN 975 MG: 325 TABLET, FILM COATED ORAL at 18:06

## 2023-11-28 RX ADMIN — IBUPROFEN 600 MG: 400 TABLET ORAL at 15:38

## 2023-11-28 RX ADMIN — METRONIDAZOLE 500 MG: 500 INJECTION, SOLUTION INTRAVENOUS at 12:25

## 2023-11-28 RX ADMIN — ACETAMINOPHEN 975 MG: 325 TABLET, FILM COATED ORAL at 00:13

## 2023-11-28 RX ADMIN — DOCUSATE SODIUM 50 MG AND SENNOSIDES 8.6 MG 1 TABLET: 8.6; 5 TABLET, FILM COATED ORAL at 21:30

## 2023-11-28 RX ADMIN — METRONIDAZOLE 500 MG: 500 INJECTION, SOLUTION INTRAVENOUS at 01:00

## 2023-11-28 ASSESSMENT — ACTIVITIES OF DAILY LIVING (ADL)
ADLS_ACUITY_SCORE: 22
ADLS_ACUITY_SCORE: 20
ADLS_ACUITY_SCORE: 22
ADLS_ACUITY_SCORE: 20
ADLS_ACUITY_SCORE: 22
ADLS_ACUITY_SCORE: 20
ADLS_ACUITY_SCORE: 22
ADLS_ACUITY_SCORE: 22
ADLS_ACUITY_SCORE: 20

## 2023-11-28 NOTE — CONSULTS
[facility]    Hospitalist Consultation    Date of Admission:  11/27/2023  Date of Consult (When I saw the patient): 11/28/23    Assessment & Plan   Tammy Osorio is a pleasant 78 year old woman with history of HTN, DM type II, ascending aortic aneurysm, tobacco use, COPD and Left adnexal mass; she was admitted on 11/27/2023 for planned resection of this mass.  The Hospitalist service was asked to see Tammy for assistance with her medical issues.  She underwent exploratory laparotomy, bilateral salpingo-oophorectomy, and pelvic washings and has done fairly well since her surgery.  Has had some decrease in urine output since surgery on 11/27, and PO intake has only been fair today.  Current medical issues:    DM type II; sugars since admission have been stable.  - advance PO diet as able, per Gyn/Onc; return to mod-consistent CHO diet at time of discharge  - agree w/ holding metformin until PO intake adequate and urine output has improved  - continue sliding scale novolog and current monitoring    HTN; pressures elevated at times.  Unclear if she had better BP control prior to admission.  - continue w/ PTA amlodipine 10 mg Q day, lisinopril 20 mg Q day, metoprolol 50 mg BID  - repeat BMP 11/29  - add PRN PO clonidine    Ascending aortic aneurysm; per H & P: 5.5 cm on 1/2014 CT scan -> stable.  - follow up with Dr. Maya (CV Surgeon, [facility]) after discharge    COPD; stable per recent notes.  Tobacco dependence; still smoking 0.5-1 pack per day.  - says she has tried to quit multiple times  - wants to try nicorette gum PRN while here  - monitor    Recent abnormal EKG; no recent cardiac symptoms.  - per reports, had nuclear stress study 11/1/23 that was negative for ischemia.      DVT Prophylaxis: defer to primary service Gyn/Onc  Code Status: Full Code    Disposition: per Gyn/Onc team      TORREY Alonso MD, FACP     [facility] Hospitalist    Reason for Consult   Assistance with  multiple medical issues; thank you for this consult request.    Primary Care Physician /providers  MD Clinton Whitney PA-C    Chief Complaint   See 11/17 H & P; at present: admitted 11/27 for abdominal mass removal.    History was obtained from the patient and from EHR    History of Present Illness   Per 11/17 H & P: admitted 11/27 for elective removal of Left adnexal mass.    Past Medical History    I have reviewed this patient's medical history and updated it with pertinent information if needed.   Past Medical History:   Diagnosis Date    Anxiety     Aortic regurgitation     Ascending aortic aneurysm (H24)     Diabetes (H)     Hypertension     Osteopenia     Pelvic mass     Pulmonary emphysema (H)     Sciatica, unspecified laterality        Past Surgical History   I have reviewed this patient's surgical history and updated it with pertinent information if needed.  Past Surgical History:   Procedure Laterality Date    HYSTERECTOMY TOTAL ABDOMINAL, BILATERAL SALPINGO-OOPHORECTOMY, COMBINED Bilateral 11/27/2023    Procedure: EXPLORATORY LAPAROTOMY , BILATERAL SALPINGO OOPHORECTOMY , PELVIC WASHING;  Surgeon: Naye Scruggs MD;  Location:  OR       Prior to Admission Medications   Prior to Admission Medications   Prescriptions Last Dose Informant Patient Reported? Taking?   amLODIPine (NORVASC) 10 MG tablet 11/27/2023 at am  Yes Yes   Sig: Take 10 mg by mouth daily   lisinopril (ZESTRIL) 20 MG tablet 11/26/2023 at pm  Yes Yes   Sig: Take 20 mg by mouth every evening   metFORMIN (GLUCOPHAGE XR) 500 MG 24 hr tablet 11/26/2023  Yes Yes   Sig: Take 1,000 mg by mouth 2 times daily (with meals)   metoprolol tartrate (LOPRESSOR) 50 MG tablet 11/27/2023 at am  Yes Yes   Sig: Take 50 mg by mouth 2 times daily   sertraline (ZOLOFT) 50 MG tablet 11/26/2023 at pm  Yes Yes   Sig: Take 50 mg by mouth every evening      Facility-Administered Medications: None     Allergies   Allergies   Allergen Reactions     Sulfa Antibiotics Swelling     Swelling of lips       Social History   I have reviewed this patient's social history and updated it with pertinent information if needed. Tammy Osorio  reports that she has been smoking cigarettes. She has never used smokeless tobacco. She reports current alcohol use. She reports that she does not use drugs.  Is ; lives w/ her .  Works in Real Estate.    Family History   I have reviewed this patient's family history and updated it with pertinent information if needed.   No family history on file.    Review of Systems   The 10 point Review of Systems is negative other than noted in the HPI or here.  Mid/lower abdominal pain when coughing.  Appetite fair.  Still fatigued today; has been up walking short distances.  Last BM 11/27 a.m.    Physical Exam   Temp: 98  F (36.7  C) Temp src: Oral BP: (!) 143/47 Pulse: 61   Resp: 16 SpO2: 95 % O2 Device: None (Room air) Oxygen Delivery: 2 LPM  Vital Signs with Ranges  Temp:  [97.4  F (36.3  C)-98.8  F (37.1  C)] 98  F (36.7  C)  Pulse:  [61-79] 61  Resp:  [16-28] 16  BP: (140-179)/(41-66) 143/47  SpO2:  [93 %-100 %] 95 %  116 lbs 1.6 oz    Constitutional: NAD; lying in bed  Eyes: sclerae clear; EOMI  HEENT: MM's moist  Respiratory: fair a/e bilaterally; no wheezing  Cardiovascular: RRR; no m/r/g  GI: abdomen protuberant; + BS; soft.  No focal tenderness or distension.  Skin: vertical dressing over mid-abdomen; several areas of dried drainage.  No odor noted.  Musculoskeletal: no edema; thin  Neurologic: awake, conversant, no focal deficits    Data   Data reviewed today: All pertinent laboratory and imaging results from this encounter were reviewed. I    Recent Labs   Lab 11/28/23  1237 11/28/23  0817 11/28/23  0618 11/27/23  2141 11/27/23  1307   WBC  --  9.1  --   --   --    HGB  --  12.5  --   --   --    MCV  --  93  --   --   --    PLT  --  253  --   --   --    NA  --  134*  --   --   --    POTASSIUM  --  4.5  --   --   4.5   CHLORIDE  --  101  --   --   --    CO2  --  24  --   --   --    BUN  --  18.3  --   --   --    CR  --  0.75  --   --  0.78   ANIONGAP  --  9  --   --   --    ARCHANA  --  8.7*  --   --   --    * 146* 132*   < > 149*    < > = values in this interval not displayed.

## 2023-11-28 NOTE — PROGRESS NOTES
"[facility]  Hospitalist Progress Note          Assessment and Plan:       Left ovarian mass:  POD 1 Xlap, BSO for left ovarian mucinous neoplasm with component of Javier tumor.  Await final pathology.  Pain control good.  On full liquid diet - ADAT.  UO marginal overnight and received IVF bolus.  Will keep mars in another 24 hours and continue IVF - both can be stopped tomorrow if continues to do well.    DVT prophylaxis:  Lovenox subcutaneous in the hospital.  Change to Eliquis 2.5 mg on discharge daily x 25 days.    HTN:  Adjust doses of Amlodopine and Lisinopril    Diabetes mellitus:  Metformin on hold until PO intake better.  Continue to follow glucometer readings.  Hospitalist following.    Disposition:  Anticipated discharge likely Thurs/Friday.               Interval History:     doing well; no cp, sob, n/v, Pain reasonable              Medications:   I have reviewed this patient's current medications               Physical Exam:   Blood pressure (!) 142/43, pulse 74, temperature 97.4  F (36.3  C), temperature source Oral, resp. rate 16, height 1.676 m (5' 6\"), weight 50.2 kg (110 lb 9.6 oz), SpO2 96%.      Vital Sign Ranges  Temperature Temp  Av.3  F (36.8  C)  Min: 97.4  F (36.3  C)  Max: 98.8  F (37.1  C)   Blood pressure Systolic (24hrs), Av , Min:140 , Max:179        Diastolic (24hrs), Av, Min:41, Max:66      Pulse Pulse  Av.3  Min: 64  Max: 79   Respirations Resp  Av.2  Min: 16  Max: 28   Pulse oximetry SpO2  Av.2 %  Min: 93 %  Max: 100 %         Intake/Output Summary (Last 24 hours) at 2023 0838  Last data filed at 2023 0628  Gross per 24 hour   Intake 900 ml   Output 450 ml   Net 450 ml       Lungs:   Diminished BS at bases otherwise clear     Cardiovascular:   normal apical pulses      Abdomen:   Active BS, slightly protuberant and tympany present, dressing with scant dried staining.                Data:   All laboratory data " reviewed

## 2023-11-28 NOTE — PHARMACY-ADMISSION MEDICATION HISTORY
Pharmacist Admission Medication History    Admission medication history is complete. The information provided in this note is only as accurate as the sources available at the time of the update.    Information Source(s): Patient and CareEverywhere/SureScripts via in-person    Pertinent Information: She has taken Vit D and MVI in past but not currently taking any vitamins or supplements    Changes made to PTA medication list:  Added: None  Deleted: None  Changed: amlodipine from 5mg to 10mg, lisinopril 10mg to 20mg    Allergies reviewed with patient and updates made in EHR: yes    Medication History Completed By: Natalia Salas [facility] 11/27/2023 7:51 PM    PTA Med List   Medication Sig Last Dose    amLODIPine (NORVASC) 10 MG tablet Take 10 mg by mouth daily 11/27/2023 at am    lisinopril (ZESTRIL) 20 MG tablet Take 20 mg by mouth every evening 11/26/2023 at pm    metFORMIN (GLUCOPHAGE XR) 500 MG 24 hr tablet Take 1,000 mg by mouth 2 times daily (with meals) 11/26/2023    metoprolol tartrate (LOPRESSOR) 50 MG tablet Take 50 mg by mouth 2 times daily 11/27/2023 at am    sertraline (ZOLOFT) 50 MG tablet Take 50 mg by mouth every evening 11/26/2023 at pm

## 2023-11-28 NOTE — PLAN OF CARE
Orientation: A&Ox4  Activity: SBA  Diet/BS Checks: regular, encourage fluid intake   Tele:  n/a   IV Access/Drains: PIV infusing NS @ 100 ml/hr  Pain Management: scheduled ibuprofen and tylenol  Abnormal VS/Results: VSS  Bowel/Bladder: low UOP this morning, bolus given per orders.   Skin/Wounds: midline incision, drainage marked  Consults:   D/C Disposition: pending  Other Info:

## 2023-11-28 NOTE — PLAN OF CARE
Goal Outcome Evaluation: 1930-0700      VSS on RA ex hypertensive. Mild incisional discomfort controlled with scheduled Tylenol and Ibuprofen. Dangling, stood and walked to door of room. Abdominal binder on to help keep incision intact. Dressing CDI, no new drainage since start of my shift, outlined. Denies nausea. PIV infusing NS @ 100 ml/hr with intermittent antibiotics. Sequentials on. Instruction on IS given. BS stable.

## 2023-11-28 NOTE — PROGRESS NOTES
Notified provider about indwelling mars catheter discussed removal or continued need.    Did provider choose to remove indwelling mars catheter? No    Provider's mars indication for keeping indwelling mars catheter: /GI/GYN Pelvic Procedure .    Is there an order for indwelling mars catheter? Yes    *If there is a plan to keep mars catheter in place at discharge daily notification with provider is not necessary, but please add a notation in the treatment team sticky note that the patient will be discharging with the catheter.

## 2023-11-28 NOTE — PLAN OF CARE
Goal Outcome Evaluation:    On-call gyn onc doc notified through answering service of low urine output, 100 ml in 4 hours @ 0147.     Will give 500 ml 0.9% NS over 1 hour for output of <60 ml/2 hours per post op orders.

## 2023-11-28 NOTE — DISCHARGE SUMMARY
"HOSPITAL DISCHARGE SUMMARY    Patient Name: Tammy Osorio  YOB: 1945 Age: 78 year old  Medical Record Number: [id number]  Primary Physician: Heriberto Jackson  Phone: [phone]  Admission Date: 11/27/2023  Discharge Date: 12/2/2023    Tammy Osorio  will be discharged from [facility] to Home.    PRINCIPAL DISCHARGE DIAGNOSIS: left ovarian mass    BRIEF HOSPITAL COURSE: This 78 year old female admitted following exploratory laparotomy, bilateral salpingo-oophorectomy, pelvic washings. She tolerated the procedure well. She developed a hematoma in the superior aspect of the incision.  Some staples were removed and wound was packed.  Hgb remained stable and no signs of infection. She was discharged to home with home health care POD #5 with adequate pain control, tolerating orals, voiding and ambulating.    PROCEDURES PERFORMED DURING HOSPITALIZATION:   Exploratory laparotomy  Bilateral salpingo-oophorectomy      COMPLICATIONS IN HOSPITAL: None    CONSULTATIONS:  Hospital medicine     PERTINENT FINDINGS/RESULTS AT DISCHARGE:   BP (!) 142/43 (BP Location: Right arm)   Pulse 74   Temp 97.4  F (36.3  C) (Oral)   Resp 16   Ht 1.676 m (5' 6\")   Wt 50.2 kg (110 lb 9.6 oz)   SpO2 96%   BMI 17.85 kg/m      Latest Laboratory Results:  Chem:  CBC RESULTS:   Recent Labs   Lab Test 11/28/23  0817   WBC 9.1   RBC 4.09   HGB 12.5   HCT 38.2   MCV 93   MCH 30.6   MCHC 32.7   RDW 13.6        Last Basic Metabolic Panel:  Lab Results   Component Value Date     11/28/2023      Lab Results   Component Value Date    POTASSIUM 4.5 11/28/2023     Lab Results   Component Value Date    CHLORIDE 101 11/28/2023     Lab Results   Component Value Date    ARCHANA 8.7 11/28/2023     Lab Results   Component Value Date    CO2 24 11/28/2023     Lab Results   Component Value Date    BUN 18.3 11/28/2023     Lab Results   Component Value Date    CR 0.75 11/28/2023     Lab Results   Component Value " Date     11/28/2023     11/28/2023         IMPORTANT PENDING TEST RESULTS:  Pathology    CONDITION AT DISCHARGE:    Stabilized    DISCHARGE ORDERS  Current Discharge Medication List        CONTINUE these medications which have NOT CHANGED    Details   amLODIPine (NORVASC) 10 MG tablet Take 10 mg by mouth daily      lisinopril (ZESTRIL) 20 MG tablet Take 20 mg by mouth every evening      metFORMIN (GLUCOPHAGE XR) 500 MG 24 hr tablet Take 1,000 mg by mouth 2 times daily (with meals)      metoprolol tartrate (LOPRESSOR) 50 MG tablet Take 50 mg by mouth 2 times daily      sertraline (ZOLOFT) 50 MG tablet Take 50 mg by mouth every evening             DISCHARGE INSTRUCTION.  Discharge instructions following your gynecologic procedure:  Returning to work/activities:  -Typically patients can expect to return to light work within 2-4 weeks.  -No driving or operating machinery while taking prescription pain medication.  -You should avoid heavy lifting until your follow up visit. No lifting greater than 20 pounds for 2 weeks.     Diet:  -as tolerated    Pain:  -Motrin (or other NSAIDs) are a good additional therapy and recommend trying this in addition to other pain relievers.  -Typically there is a minimal to moderate amount of incisional pain after surgery.  - An ice pack is recommended intermittently for the first 48 hours to help reduce pain & swelling.  -Most patients are provided a prescription for medication to take on a short term basis to help relieve the discomfort.  -If pain medication refills are needed we require you contact our office during regular business hours. (8am-5pm Monday-Friday)  -Please be aware that pain medication can cause constipation.  It may be recommended to take an over the counter stool softener as directed to prevent constipation.     Constipation:  -The pain medication you are prescribed at the time of your surgery can cause constipation.   -We recommend that you take an over  the counter stool softener such as colace or senokot-s on a regular basis until you have stopped the pain medication or bowel movements are regular. You make take 1-4 tablets twice per day.   -For constipation lasting 3 days please take Milk of Magnesia or Miralax as directed on the bottles. If you have taken Milk of Magnesia and Miralax and still have not had a bowel movement please contact your our office.    Incision/Wound care:  -Your incision is closed with staples; these will be removed at your follow up appointment. You may get the staples wet in the shower and have soap/water rinse over the top then pat dry. You should not bathe/swim/submerge incisions until they are completely healed. It is okay to cover your incision with clean dry gauze or leave uncovered. If using gauze, please change daily. If you noticed redness, swelling, or any unusual drainage from the incision, you may call our office at [phone].      Bladder symptoms:  -You may also have bladder irritation of difficulty starting urination from your surgery and this is normal. Please call if you have pain or burning when you urinate or fevers.     Follow up care:  -You will be asked to see Dr. Scruggs's Nurse Practitioner, Physician Assistant, or RN in 2 weeks and in 8 weeks.   -If you don't already have an appointment, please contact the office and our staff will happily assist you in scheduling your appointment. Bring your insurance card & government issued ID card to your appointment.    CALL YOUR SURGEON @[phone] FOR ANY OF THE FOLLOWING:  -Temperature greater than 101 degrees Fahrenheit  -Increased pain  -Increased shortness of breath  -Pus-like drainage, increasing redness, swelling, tenderness, or warmth at the incision site  -Persistent nausea or vomiting        FOLLOW-UP: Tammy Osorio should see Heriberto Jackson.    Specialty follow-up: as above.     AFTER HOSPITAL RECOMMENDATIONS  As above.      Physician(s) in addition  to primary physician who should receive a copy:  Heriberto Jackson PA-C

## 2023-11-29 LAB
ANION GAP SERPL CALCULATED.3IONS-SCNC: 9 MMOL/L (ref 7–15)
BUN SERPL-MCNC: 14.6 MG/DL (ref 8–23)
CALCIUM SERPL-MCNC: 8.6 MG/DL (ref 8.8–10.2)
CHLORIDE SERPL-SCNC: 99 MMOL/L (ref 98–107)
CREAT SERPL-MCNC: 0.58 MG/DL (ref 0.51–0.95)
DEPRECATED HCO3 PLAS-SCNC: 22 MMOL/L (ref 22–29)
EGFRCR SERPLBLD CKD-EPI 2021: >90 ML/MIN/1.73M2
GLUCOSE BLDC GLUCOMTR-MCNC: 137 MG/DL (ref 70–99)
GLUCOSE BLDC GLUCOMTR-MCNC: 148 MG/DL (ref 70–99)
GLUCOSE BLDC GLUCOMTR-MCNC: 163 MG/DL (ref 70–99)
GLUCOSE BLDC GLUCOMTR-MCNC: 168 MG/DL (ref 70–99)
GLUCOSE SERPL-MCNC: 159 MG/DL (ref 70–99)
HGB BLD-MCNC: 12.6 G/DL (ref 11.7–15.7)
OSMOLALITY SERPL: 275 MMOL/KG (ref 280–301)
OSMOLALITY UR: 674 MMOL/KG (ref 100–1200)
PATH REPORT.COMMENTS IMP SPEC: NORMAL
PATH REPORT.COMMENTS IMP SPEC: NORMAL
PATH REPORT.FINAL DX SPEC: NORMAL
PATH REPORT.GROSS SPEC: NORMAL
PATH REPORT.INTRAOP OBS SPEC DOC: NORMAL
PATH REPORT.MICROSCOPIC SPEC OTHER STN: NORMAL
PATH REPORT.RELEVANT HX SPEC: NORMAL
PHOTO IMAGE: NORMAL
POTASSIUM SERPL-SCNC: 4.8 MMOL/L (ref 3.4–5.3)
SODIUM SERPL-SCNC: 129 MMOL/L (ref 135–145)
SODIUM SERPL-SCNC: 130 MMOL/L (ref 135–145)
SODIUM UR-SCNC: 64 MMOL/L

## 2023-11-29 PROCEDURE — 85018 HEMOGLOBIN: CPT | Performed by: PHYSICIAN ASSISTANT

## 2023-11-29 PROCEDURE — 250N000011 HC RX IP 250 OP 636: Mod: JZ | Performed by: PHYSICIAN ASSISTANT

## 2023-11-29 PROCEDURE — 83935 ASSAY OF URINE OSMOLALITY: CPT | Performed by: HOSPITALIST

## 2023-11-29 PROCEDURE — 250N000013 HC RX MED GY IP 250 OP 250 PS 637: Performed by: PHYSICIAN ASSISTANT

## 2023-11-29 PROCEDURE — 83930 ASSAY OF BLOOD OSMOLALITY: CPT | Performed by: HOSPITALIST

## 2023-11-29 PROCEDURE — 84295 ASSAY OF SERUM SODIUM: CPT | Performed by: HOSPITALIST

## 2023-11-29 PROCEDURE — 250N000012 HC RX MED GY IP 250 OP 636 PS 637: Performed by: PHYSICIAN ASSISTANT

## 2023-11-29 PROCEDURE — 36415 COLL VENOUS BLD VENIPUNCTURE: CPT | Performed by: HOSPITALIST

## 2023-11-29 PROCEDURE — 120N000001 HC R&B MED SURG/OB

## 2023-11-29 PROCEDURE — 250N000013 HC RX MED GY IP 250 OP 250 PS 637: Performed by: INTERNAL MEDICINE

## 2023-11-29 PROCEDURE — 250N000013 HC RX MED GY IP 250 OP 250 PS 637: Performed by: OBSTETRICS & GYNECOLOGY

## 2023-11-29 PROCEDURE — 84300 ASSAY OF URINE SODIUM: CPT | Performed by: HOSPITALIST

## 2023-11-29 PROCEDURE — 82310 ASSAY OF CALCIUM: CPT | Performed by: PHYSICIAN ASSISTANT

## 2023-11-29 PROCEDURE — 99232 SBSQ HOSP IP/OBS MODERATE 35: CPT | Performed by: HOSPITALIST

## 2023-11-29 PROCEDURE — 36415 COLL VENOUS BLD VENIPUNCTURE: CPT | Performed by: PHYSICIAN ASSISTANT

## 2023-11-29 RX ADMIN — CALCIUM CARBONATE (ANTACID) CHEW TAB 500 MG 500 MG: 500 CHEW TAB at 20:41

## 2023-11-29 RX ADMIN — CLONIDINE HYDROCHLORIDE 0.1 MG: 0.1 TABLET ORAL at 00:23

## 2023-11-29 RX ADMIN — ACETAMINOPHEN 975 MG: 325 TABLET, FILM COATED ORAL at 12:20

## 2023-11-29 RX ADMIN — ENOXAPARIN SODIUM 40 MG: 40 INJECTION SUBCUTANEOUS at 10:22

## 2023-11-29 RX ADMIN — AMLODIPINE BESYLATE 10 MG: 5 TABLET ORAL at 09:01

## 2023-11-29 RX ADMIN — LISINOPRIL 20 MG: 20 TABLET ORAL at 20:41

## 2023-11-29 RX ADMIN — IBUPROFEN 600 MG: 400 TABLET ORAL at 16:10

## 2023-11-29 RX ADMIN — IBUPROFEN 600 MG: 400 TABLET ORAL at 03:37

## 2023-11-29 RX ADMIN — ACETAMINOPHEN 975 MG: 325 TABLET, FILM COATED ORAL at 00:20

## 2023-11-29 RX ADMIN — ACETAMINOPHEN 975 MG: 325 TABLET, FILM COATED ORAL at 18:21

## 2023-11-29 RX ADMIN — INSULIN ASPART 1 UNITS: 100 INJECTION, SOLUTION INTRAVENOUS; SUBCUTANEOUS at 19:23

## 2023-11-29 RX ADMIN — DOCUSATE SODIUM 50 MG AND SENNOSIDES 8.6 MG 1 TABLET: 8.6; 5 TABLET, FILM COATED ORAL at 09:01

## 2023-11-29 RX ADMIN — ACETAMINOPHEN 975 MG: 325 TABLET, FILM COATED ORAL at 06:17

## 2023-11-29 RX ADMIN — DOCUSATE SODIUM 50 MG AND SENNOSIDES 8.6 MG 1 TABLET: 8.6; 5 TABLET, FILM COATED ORAL at 20:41

## 2023-11-29 RX ADMIN — ONDANSETRON 4 MG: 4 TABLET, ORALLY DISINTEGRATING ORAL at 23:06

## 2023-11-29 RX ADMIN — IBUPROFEN 600 MG: 400 TABLET ORAL at 10:21

## 2023-11-29 RX ADMIN — METOPROLOL TARTRATE 50 MG: 50 TABLET, FILM COATED ORAL at 09:01

## 2023-11-29 RX ADMIN — ONDANSETRON 4 MG: 4 TABLET, ORALLY DISINTEGRATING ORAL at 16:10

## 2023-11-29 RX ADMIN — IBUPROFEN 600 MG: 400 TABLET ORAL at 20:41

## 2023-11-29 RX ADMIN — SERTRALINE HYDROCHLORIDE 50 MG: 50 TABLET ORAL at 20:41

## 2023-11-29 RX ADMIN — METOPROLOL TARTRATE 50 MG: 50 TABLET, FILM COATED ORAL at 20:40

## 2023-11-29 RX ADMIN — CALCIUM CARBONATE (ANTACID) CHEW TAB 500 MG 500 MG: 500 CHEW TAB at 16:22

## 2023-11-29 ASSESSMENT — ACTIVITIES OF DAILY LIVING (ADL)
ADLS_ACUITY_SCORE: 20
ADLS_ACUITY_SCORE: 20
ADLS_ACUITY_SCORE: 22
ADLS_ACUITY_SCORE: 22
ADLS_ACUITY_SCORE: 20
ADLS_ACUITY_SCORE: 22
ADLS_ACUITY_SCORE: 20
ADLS_ACUITY_SCORE: 22
ADLS_ACUITY_SCORE: 20

## 2023-11-29 NOTE — PROGRESS NOTES
[facility]    Medicine Progress Note - Hospitalist Service    Date of Admission:  11/27/2023    Assessment & Plan   Tammy Osorio is a 78 year old female admitted on 11/27/2023.   history of HTN, DM type II, ascending aortic aneurysm, tobacco use, COPD and Left adnexal mass; she was admitted on 11/27/2023 for planned resection of this mass.  Hospitalist consulted for medical co-management.      Left adnexal mass s/p ex lap, bilateral salpingo-oophorectomy, and pelvic washings 11/27  - post-op management per Gyn/Onc     Hyponatremia  *Na 134 on 11/28  - down to 130 on 11/29; agree with stopping NS  - etiology unclear - appears euvolemic - possibly low solute load (very minimal intake since surgery) vs SIADH related to pain (though denies severe pain)  - repeat Na this afternoon with osmolality; check urine Na and osm  - BMP in AM    DM type II  No recent A1C  - PTA metformin on hold  - start prandial insulin  1/15g carb  - low dose ssi     HTN  - continue PTA amlodipine, lisinopril, metoprolol  - pressures remain moderately elevated - ?pain     COPD  *not on maintenance inhalers    Tobacco dependence  *smoking 0.5-1 pack per day.  - nicorette gum PRN      Recent abnormal EKG  *no recent cardiac symptoms.  *per reports, had nuclear stress study 11/1/23 that was negative for ischemia.    Ascending aortic aneurysm  *per H&P: 5.5 cm on 1/2014 CT scan -> stable.  - follow up with Dr. Maya (CV Surgeon, [facility]) as per routine          Diet: Diet  Advance Diet as Tolerated: Low Fiber    DVT Prophylaxis: Defer to primary service  Andres Catheter: Not present  Lines: None     Cardiac Monitoring: None  Code Status: Full Code      Clinically Significant Risk Factors                  # Hypertension: Noted on problem list                   Disposition Plan      Expected Discharge Date: 11/30/2023      Destination: home with family              Shane Chan MD  Hospitalist Service  [facility]  [facility]  Securely message with [employer] (more info)  Text page via [employer] Paging/Directory   ______________________________________________________________________    Interval History   Doing well, though reports poor appetite.  Has had minimal intake since surgery.  Reports dry mouth.  No edema or dyspnea.  No other complaints.     Physical Exam   Vital Signs: Temp: 98.5  F (36.9  C) Temp src: Oral BP: (!) 154/47 Pulse: 71   Resp: 18 SpO2: 93 % O2 Device: None (Room air)    Weight: 118 lbs 8 oz    General Appearance: Thin female in NAD  Respiratory: lungs CTAB  Cardiovascular: RRR, normal s1/s2 without murmur  GI:   Skin: no edema   Other: Alert and appropriate, CN grossly intact      Medical Decision Making       35 MINUTES SPENT BY ME on the date of service doing chart review, history, exam, documentation & further activities per the note.      Data     I have personally reviewed the following data over the past 24 hrs:    N/A  \   12.6   / N/A     130 (L) 99 14.6 /  163 (H)   4.8 22 0.58 \

## 2023-11-29 NOTE — PLAN OF CARE
Goal Outcome Evaluation: 1930-0700    Orientation: A&Ox4  Activity: SBA  Diet/BS Checks: regular, encourage fluid intake.  this am.    Tele:  n/a   IV Access/Drains: PIV infusing NS @ 100 ml/hr  Pain Management: scheduled ibuprofen and tylenol for mild discomfort. Abdominal binder also in place.  Abnormal VS/Results: VSS ex hypertensive. PRN Clonidine given x1 for SBP>160.   Bowel/Bladder: Andres remains patent.   Skin/Wounds: midline incision, drainage marked  Consults:   D/C Disposition: pending- likely Thursday or Friday  Other Info:

## 2023-11-29 NOTE — PROGRESS NOTES
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  Await final pathology.  Pain control good.  Advancing diet to regular, tolerating well. Andres out today, IV d'ryan.  Urinating well, passing gas.     DVT prophylaxis:  Lovenox subcutaneous in the hospital.  Change to Eliquis 2.5 mg on discharge daily x 25 days.    HTN:  Adjust doses of Amlodopine and Lisinopril    Diabetes mellitus:  Metformin on hold until PO intake better.  Continue to follow glucometer readings.  Hospitalist following.    Disposition:  Anticipated discharge likely Thurs/Friday.    Florecita Patrick, BRIDGER   [facility]  [phone]               Interval History:   Sitting in chair, only mild nausea, pain well controlled.  Passing gas.              Medications:   I have reviewed this patient's current medications               Physical Exam:   Blood pressure (!) 154/47, pulse 71, temperature 98.5  F (36.9  C), temperature source Oral, resp. rate 18, height 1.676 m (5' 6\"), weight 53.8 kg (118 lb 8 oz), SpO2 93%.      Vital Sign Ranges  Temperature Temp  Av.3  F (36.8  C)  Min: 97.4  F (36.3  C)  Max: 98.8  F (37.1  C)   Blood pressure Systolic (24hrs), Av , Min:140 , Max:179        Diastolic (24hrs), Av, Min:41, Max:66      Pulse Pulse  Av.3  Min: 64  Max: 79   Respirations Resp  Av.2  Min: 16  Max: 28   Pulse oximetry SpO2  Av.2 %  Min: 93 %  Max: 100 %         Intake/Output Summary (Last 24 hours) at 2023 0809  Last data filed at 2023 0628  Gross per 24 hour   Intake 900 ml   Output 450 ml   Net 450 ml       Lungs:   Diminished BS at bases otherwise clear     Cardiovascular:   normal apical pulses      Abdomen:   Active BS, slightly protuberant but soft.  Dressing removed, staples intact, suture line c/d. No oozing or redness.                     Data: "   All laboratory data reviewed

## 2023-11-30 LAB
ANION GAP SERPL CALCULATED.3IONS-SCNC: 9 MMOL/L (ref 7–15)
BUN SERPL-MCNC: 21.9 MG/DL (ref 8–23)
CALCIUM SERPL-MCNC: 8.7 MG/DL (ref 8.8–10.2)
CHLORIDE SERPL-SCNC: 94 MMOL/L (ref 98–107)
CREAT SERPL-MCNC: 0.63 MG/DL (ref 0.51–0.95)
DEPRECATED HCO3 PLAS-SCNC: 23 MMOL/L (ref 22–29)
EGFRCR SERPLBLD CKD-EPI 2021: 90 ML/MIN/1.73M2
GLUCOSE BLDC GLUCOMTR-MCNC: 162 MG/DL (ref 70–99)
GLUCOSE BLDC GLUCOMTR-MCNC: 183 MG/DL (ref 70–99)
GLUCOSE BLDC GLUCOMTR-MCNC: 210 MG/DL (ref 70–99)
GLUCOSE BLDC GLUCOMTR-MCNC: 228 MG/DL (ref 70–99)
GLUCOSE SERPL-MCNC: 202 MG/DL (ref 70–99)
PATH REPORT.COMMENTS IMP SPEC: NORMAL
PATH REPORT.COMMENTS IMP SPEC: NORMAL
PATH REPORT.FINAL DX SPEC: NORMAL
PATH REPORT.GROSS SPEC: NORMAL
PATH REPORT.MICROSCOPIC SPEC OTHER STN: NORMAL
PATH REPORT.RELEVANT HX SPEC: NORMAL
PLATELET # BLD AUTO: 322 10E3/UL (ref 150–450)
POTASSIUM SERPL-SCNC: 4.1 MMOL/L (ref 3.4–5.3)
SODIUM SERPL-SCNC: 126 MMOL/L (ref 135–145)
SODIUM SERPL-SCNC: 126 MMOL/L (ref 135–145)
SODIUM SERPL-SCNC: 128 MMOL/L (ref 135–145)

## 2023-11-30 PROCEDURE — 88112 CYTOPATH CELL ENHANCE TECH: CPT | Mod: 26

## 2023-11-30 PROCEDURE — 85049 AUTOMATED PLATELET COUNT: CPT | Performed by: PHYSICIAN ASSISTANT

## 2023-11-30 PROCEDURE — 36415 COLL VENOUS BLD VENIPUNCTURE: CPT | Performed by: HOSPITALIST

## 2023-11-30 PROCEDURE — 250N000013 HC RX MED GY IP 250 OP 250 PS 637: Performed by: PHYSICIAN ASSISTANT

## 2023-11-30 PROCEDURE — 250N000013 HC RX MED GY IP 250 OP 250 PS 637: Performed by: OBSTETRICS & GYNECOLOGY

## 2023-11-30 PROCEDURE — 250N000011 HC RX IP 250 OP 636: Mod: JZ | Performed by: PHYSICIAN ASSISTANT

## 2023-11-30 PROCEDURE — 99232 SBSQ HOSP IP/OBS MODERATE 35: CPT | Performed by: HOSPITALIST

## 2023-11-30 PROCEDURE — 84295 ASSAY OF SERUM SODIUM: CPT | Performed by: HOSPITALIST

## 2023-11-30 PROCEDURE — 250N000013 HC RX MED GY IP 250 OP 250 PS 637: Performed by: INTERNAL MEDICINE

## 2023-11-30 PROCEDURE — 36415 COLL VENOUS BLD VENIPUNCTURE: CPT | Performed by: PHYSICIAN ASSISTANT

## 2023-11-30 PROCEDURE — 88305 TISSUE EXAM BY PATHOLOGIST: CPT | Mod: 26

## 2023-11-30 PROCEDURE — 82374 ASSAY BLOOD CARBON DIOXIDE: CPT | Performed by: HOSPITALIST

## 2023-11-30 PROCEDURE — 120N000001 HC R&B MED SURG/OB

## 2023-11-30 PROCEDURE — 82947 ASSAY GLUCOSE BLOOD QUANT: CPT | Performed by: HOSPITALIST

## 2023-11-30 PROCEDURE — 82435 ASSAY OF BLOOD CHLORIDE: CPT | Performed by: HOSPITALIST

## 2023-11-30 RX ORDER — ACETAMINOPHEN 325 MG/1
975 TABLET ORAL ONCE
Status: COMPLETED | OUTPATIENT
Start: 2023-11-30 | End: 2023-11-30

## 2023-11-30 RX ORDER — SIMETHICONE 80 MG
80 TABLET,CHEWABLE ORAL EVERY 6 HOURS PRN
Status: DISCONTINUED | OUTPATIENT
Start: 2023-11-30 | End: 2023-12-02 | Stop reason: HOSPADM

## 2023-11-30 RX ADMIN — METOPROLOL TARTRATE 50 MG: 50 TABLET, FILM COATED ORAL at 21:19

## 2023-11-30 RX ADMIN — LISINOPRIL 20 MG: 20 TABLET ORAL at 21:19

## 2023-11-30 RX ADMIN — DOCUSATE SODIUM 50 MG AND SENNOSIDES 8.6 MG 1 TABLET: 8.6; 5 TABLET, FILM COATED ORAL at 21:19

## 2023-11-30 RX ADMIN — METOPROLOL TARTRATE 50 MG: 50 TABLET, FILM COATED ORAL at 08:48

## 2023-11-30 RX ADMIN — ACETAMINOPHEN 975 MG: 325 TABLET, FILM COATED ORAL at 19:39

## 2023-11-30 RX ADMIN — PROCHLORPERAZINE EDISYLATE 5 MG: 5 INJECTION INTRAMUSCULAR; INTRAVENOUS at 00:22

## 2023-11-30 RX ADMIN — AMLODIPINE BESYLATE 10 MG: 5 TABLET ORAL at 08:48

## 2023-11-30 RX ADMIN — IBUPROFEN 600 MG: 400 TABLET ORAL at 08:46

## 2023-11-30 RX ADMIN — SIMETHICONE 80 MG: 80 TABLET, CHEWABLE ORAL at 00:47

## 2023-11-30 RX ADMIN — ACETAMINOPHEN 650 MG: 325 TABLET, FILM COATED ORAL at 23:27

## 2023-11-30 RX ADMIN — ACETAMINOPHEN 975 MG: 325 TABLET, FILM COATED ORAL at 00:07

## 2023-11-30 RX ADMIN — DOCUSATE SODIUM 50 MG AND SENNOSIDES 8.6 MG 1 TABLET: 8.6; 5 TABLET, FILM COATED ORAL at 08:47

## 2023-11-30 RX ADMIN — CLONIDINE HYDROCHLORIDE 0.1 MG: 0.1 TABLET ORAL at 23:39

## 2023-11-30 RX ADMIN — ACETAMINOPHEN 975 MG: 325 TABLET, FILM COATED ORAL at 12:25

## 2023-11-30 RX ADMIN — KETOROLAC TROMETHAMINE 15 MG: 15 INJECTION, SOLUTION INTRAMUSCULAR; INTRAVENOUS at 03:10

## 2023-11-30 RX ADMIN — ONDANSETRON 4 MG: 2 INJECTION INTRAMUSCULAR; INTRAVENOUS at 05:07

## 2023-11-30 RX ADMIN — ENOXAPARIN SODIUM 40 MG: 40 INJECTION SUBCUTANEOUS at 10:57

## 2023-11-30 RX ADMIN — INSULIN ASPART 1 UNITS: 100 INJECTION, SOLUTION INTRAVENOUS; SUBCUTANEOUS at 14:16

## 2023-11-30 ASSESSMENT — ACTIVITIES OF DAILY LIVING (ADL)
ADLS_ACUITY_SCORE: 20
ADLS_ACUITY_SCORE: 22
ADLS_ACUITY_SCORE: 22
DEPENDENT_IADLS:: INDEPENDENT
ADLS_ACUITY_SCORE: 22

## 2023-11-30 NOTE — PLAN OF CARE
Goal Outcome Evaluation:         Orientation: AOx4  Activity: SBA, GB/W, Ambulated 2 hallway  Diet/BS Checks: Low fiber diet and fluid restrict to 1800ml per shift  BS checks with meals and HS with sliding scale insulin.  Tele: N/A   IV Access/Drains: Right PIV SL  Pain Management: Denies pain  VS/Results: VSS HTN   Bowel/Bladder: Continent of B/B with minimum urine output, no BM this shift.  Skin/Wounds: Abd blood clot, steri strip packed and changed 1X during shift  Midline incision bright red blood oozing upper incision site   ABD pad Consults: GynOnc  D/C Disposition: pending clinical improvement.  Other Info: Nausea improved, food intake minimal

## 2023-11-30 NOTE — PLAN OF CARE
Goal Outcome Evaluation:    POD 2 BSO  Orientation: A&Ox4  Activity: SBA GB/W, up in chair frequently this shift.  Diet/BS Checks: Regular diet. BS checks with meals and HS with sliding scale insulin.  Tele: N/A   IV Access/Drains: PIV SL.  Pain Management: Mild abd discomfort managed with scheduled ibuprofen and tylenol  Abnormal VS/Results: VSS ex HTN at times, did not meet parameters for PRN BP meds.  Bowel/Bladder: Andres removed today, voiding well. +Flatus, no BM this shift.  Skin/Wounds: Midline incision with staples, BALDEMAR. ABD pad and abd binder in place.  Consults: GynOnc  D/C Disposition: pending   Other Info: PRN zofran given 1x for nausea. PRN Tums given 1x for heartburn.

## 2023-11-30 NOTE — PROVIDER NOTIFICATION
MD Notification    Notified Person: MD    Notified Person Name: Linda De La Vega    Notification Date/Time: 11/30/23 5:06 PM     Notification Interaction: telephone    Purpose of Notification: changed abd dressing twice in 5 hours. Did you want to keep removing packing strips with each dressing change or tape gauze on top? Did you want to continue lovenox?    Orders Received: Okay to leave packing strips packed in incision and add more gauze on top if dressing saturated. Hold lovenox.     Comments:

## 2023-11-30 NOTE — PROGRESS NOTES
[facility]    Medicine Progress Note - Hospitalist Service    Date of Admission:  11/27/2023    Assessment & Plan   Tammy Osorio is a 78 year old female admitted on 11/27/2023.   history of HTN, DM type II, ascending aortic aneurysm, tobacco use, COPD and Left adnexal mass; she was admitted on 11/27/2023 for planned resection of this mass.  Hospitalist consulted for medical co-management.      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  - nicorette gum PRN      Recent abnormal EKG  *no recent cardiac symptoms.  *per reports, had nuclear stress study 11/1/23 that was negative for ischemia.    Ascending aortic aneurysm  *per H&P: 5.5 cm on 1/2014 CT scan -> stable.  - follow up with Dr. Maya (CV Surgeon, [facility]) as per routine          Diet: Diet  Advance Diet as Tolerated: Low Fiber  Fluid restriction 1800 ML FLUID    DVT Prophylaxis: Defer to primary service  Andres Catheter: Not present  Lines: None     Cardiac Monitoring: None  Code Status: Full Code      Clinically Significant Risk Factors         # Hyponatremia: Lowest Na = 126 mmol/L in last 2 days, will monitor as appropriate          # Hypertension: Noted on problem list                   Disposition Plan      Expected Discharge Date: 12/01/2023       Destination: home with family              Shane Chan MD  Hospitalist Service  [facility]  Securely message with [employer] (more info)  Text page via [employer] Paging/Directory   ______________________________________________________________________    Interval History   Reports nausea improved today, again only reporting abdominal discomfort, no severe pain.  No lightheadedness.  Denies other complaints.     Physical Exam   Vital Signs: Temp: 99  F (37.2  C) Temp src: Oral BP: (!) 158/55 Pulse: 85   Resp: 16 SpO2: 95 % O2 Device: None (Room air)    Weight: 118 lbs 11.2 oz    General Appearance: Thin female in NAD  Respiratory: lungs CTAB  Cardiovascular: RRR, normal s1/s2 without murmur  GI: normal bowel sounds  Skin: no edema   Other: Alert and appropriate, CN grossly intact      Medical Decision Making       25 MINUTES SPENT BY ME on the date of service doing chart review, history, exam, documentation & further activities per the note.  MANAGEMENT DISCUSSED with the following over the past 24 hours: bedside RN   Tests ORDERED & REVIEWED in the past 24 hours:  - BMP  - platelet, serial glucose  Medical complexity over the past 24 hours:  - Prescription DRUG MANAGEMENT performed      Data     I have personally reviewed the following data over the past 24 hrs:    N/A  \   N/A   / 322     126 (L) 94 (L) 21.9 /  183 (H)   4.1 23 0.63 \

## 2023-11-30 NOTE — CONSULTS
Care Management Initial Consult    General Information  Assessment completed with: Patient, Tammy  Type of CM/SW Visit: Initial Assessment    Primary Care Provider verified and updated as needed: Yes   Readmission within the last 30 days: no previous admission in last 30 days      Reason for Consult: discharge planning  Advance Care Planning:            Communication Assessment  Patient's communication style: spoken language (English or Bilingual)    Hearing Difficulty or Deaf: no   Wear Glasses or Blind: no    Cognitive  Cognitive/Neuro/Behavioral: WDL                      Living Environment:   People in home: child(kt), adult  Rafael  Current living Arrangements: house      Able to return to prior arrangements: yes       Family/Social Support:  Care provided by: self  Provides care for: no one  Marital Status:   , Children  Rafael       Description of Support System: Supportive, Involved         Current Resources:   Patient receiving home care services: No     Community Resources: None  Equipment currently used at home: none  Supplies currently used at home: None    Employment/Financial:  Employment Status: retired        Financial Concerns: none   Referral to Financial Worker: No       Does the patient's insurance plan have a 3 day qualifying hospital stay waiver?  No    Lifestyle & Psychosocial Needs:  Social Determinants of Health     Food Insecurity: Not on file   Depression: Not on file   Housing Stability: Not on file   Tobacco Use: High Risk (11/29/2023)    Patient History     Smoking Tobacco Use: Every Day     Smokeless Tobacco Use: Never     Passive Exposure: Not on file   Financial Resource Strain: Not on file   Alcohol Use: Not on file   Transportation Needs: Not on file   Physical Activity: Not on file   Interpersonal Safety: Not on file   Stress: Not on file   Social Connections: Not on file       Functional Status:  Prior to admission patient needed assistance:   Dependent ADLs::  Independent  Dependent IADLs:: Independent       Mental Health Status:          Chemical Dependency Status:                Values/Beliefs:  Spiritual, Cultural Beliefs, Oriental orthodox Practices, Values that affect care: yes (Alevism)               Additional Information:  Per consult for discharge planning, met with patient to discuss discharge plan.  Per pt, prior to admit she has been independent with ADLs & IADLs.  Discussed wound care on incison and having home care RN support for dressing changes.  Pt in agreement with this plan.  Discussed that family will need to be taught how to do dressing changes for pt.   Pt stated understanding of this and shared that her spouse and daughter(who lives with her) will be able to learn and assist with dressing changes.  Pt ok with referral being sent to [facility].    Per pt, her follow-up with Dr. Scruggs at [facility] has been scheduled.  Referral sent to [facility].    Inpatient Care Coordinator will continue to follow for discharge planning.   MOI Celestin RN, BSN, OCN   Inpatient Care Coordination [address]  Office: [phone]

## 2023-11-30 NOTE — PROGRESS NOTES
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     Diet: continue regular diet, some acid reflux. Recommended small, frequent meals.     Wound hematoma: staple came out last night, another removed today. Will pack with 1/4 inch packing strips daily. Will get  and [facility] for teaching and have nursing start teaching here.     DVT prophylaxis:  Lovenox subcutaneous in the hospital.  Change to Eliquis 2.5 mg on discharge daily x 25 days.     HTN:  Adjust doses of Amlodopine and Lisinopril     Diabetes mellitus:  Metformin on hold until PO intake better.  Continue to follow glucometer readings.  Hospitalist following.      Principal Problem:    Pelvic mass  Active Problems:    Ovarian mass, left      Aliza ESantiago Tellez, PA-C,     Interval History   Having some reflux and nausea, no bloating. Passing small amount of flatus, no BM yet.  One of her staples came undone with some bleeding last night.     Physical Exam   Temp: 99  F (37.2  C) Temp src: Oral BP: (!) 158/55 Pulse: 85   Resp: 16 SpO2: 95 % O2 Device: None (Room air)    Vitals:    11/29/23 0625 11/30/23 0144 11/30/23 0626   Weight: 53.8 kg (118 lb 8 oz) 53.5 kg (118 lb) 53.8 kg (118 lb 11.2 oz)     Vital Signs with Ranges  Temp:  [98.5  F (36.9  C)-99  F (37.2  C)] 99  F (37.2  C)  Pulse:  [74-85] 85  Resp:  [16-18] 16  BP: (107-158)/(47-66) 158/55  SpO2:  [94 %-96 %] 95 %  I/O last 3 completed shifts:  In: 240 [P.O.:240]  Out: 1200 [Urine:1150; Emesis/NG output:50]    GENERAL: alert, NAD  CV: RRR, no murmurs  RESPIRATORY: no dyspnea, clear bilat  ABDOMEN: non-distended, +BS, soft, non-tender, superior aspect of incision with staple missing and some blood clot, consistent with hematoma. I removed  another staple and was able to remove some of the residual clot. Explored with q-tip, fascia intact. No surrounding erythema or induration to raise concern for infection.   EXTREMITY: no edema, neg Eufemia's  SKIN: warm and dry, no jaundice no rashes  NEURO: cranial nerves II-XII grossly intact, motor exam intact    Medications      acetaminophen  975 mg Oral Q6H    Followed by    [START ON 12/1/2023] acetaminophen  650 mg Oral Q6H    amLODIPine  10 mg Oral Daily    enoxaparin ANTICOAGULANT  40 mg Subcutaneous Q24H    ketorolac  15 mg Intravenous Q6H    Or    ibuprofen  600 mg Oral Q6H    insulin aspart   Subcutaneous TID AC    insulin aspart  1-3 Units Subcutaneous TID AC    insulin aspart  1-3 Units Subcutaneous At Bedtime    lisinopril  20 mg Oral QPM    metoprolol tartrate  50 mg Oral BID    senna-docusate  1 tablet Oral BID    sertraline  50 mg Oral QPM    sodium chloride (PF)  3 mL Intracatheter Q8H       Data   Results for orders placed or performed during the hospital encounter of 11/27/23 (from the past 24 hour(s))   Glucose by meter   Result Value Ref Range    GLUCOSE BY METER POCT 163 (H) 70 - 99 mg/dL   Sodium random urine   Result Value Ref Range    Sodium Urine mmol/L 64 mmol/L   Osmolality urine   Result Value Ref Range    Osmolality Urine 674 100 - 1,200 mmol/kg    Narrative    Reference Ranges depend on patient's hydration status and renal function.   Neonates:  mmol/kg   2 years and older, random specimens: 100-1200 mmol/kg; Greater than 850 mmol/kg after 12 hour fluid restriction  Urine/serum osmolality ratio: 2 years and older: 1.0-3.0; 3.0-4.7 after 12 hour fluid restriction   Osmolality   Result Value Ref Range    Osmolality Blood 275 (L) 280 - 301 mmol/kg    Narrative    Greater than 385 mmol/kg relates to stupor in hyperglycemia   Greater than 400 mmol/kg can relate to seizures   Greater than 420 mmol/kg can be lethal    Serum Osmalar Gap:   Normal <10   Larger suggest unmeasured  substances present in serum (ethanol, methanol, isopropanol, mannitol, ethylene glycol).   Sodium   Result Value Ref Range    Sodium 129 (L) 135 - 145 mmol/L   Glucose by meter   Result Value Ref Range    GLUCOSE BY METER POCT 148 (H) 70 - 99 mg/dL   Glucose by meter   Result Value Ref Range    GLUCOSE BY METER POCT 168 (H) 70 - 99 mg/dL   Glucose by meter   Result Value Ref Range    GLUCOSE BY METER POCT 162 (H) 70 - 99 mg/dL   Platelet count   Result Value Ref Range    Platelet Count 322 150 - 450 10e3/uL   Basic metabolic panel   Result Value Ref Range    Sodium 126 (L) 135 - 145 mmol/L    Potassium 4.1 3.4 - 5.3 mmol/L    Chloride 94 (L) 98 - 107 mmol/L    Carbon Dioxide (CO2) 23 22 - 29 mmol/L    Anion Gap 9 7 - 15 mmol/L    Urea Nitrogen 21.9 8.0 - 23.0 mg/dL    Creatinine 0.63 0.51 - 0.95 mg/dL    GFR Estimate 90 >60 mL/min/1.73m2    Calcium 8.7 (L) 8.8 - 10.2 mg/dL    Glucose 202 (H) 70 - 99 mg/dL

## 2023-12-01 LAB
GLUCOSE BLDC GLUCOMTR-MCNC: 188 MG/DL (ref 70–99)
GLUCOSE BLDC GLUCOMTR-MCNC: 193 MG/DL (ref 70–99)
GLUCOSE BLDC GLUCOMTR-MCNC: 193 MG/DL (ref 70–99)
GLUCOSE BLDC GLUCOMTR-MCNC: 201 MG/DL (ref 70–99)
GLUCOSE BLDC GLUCOMTR-MCNC: 201 MG/DL (ref 70–99)
GLUCOSE BLDC GLUCOMTR-MCNC: 220 MG/DL (ref 70–99)
HGB BLD-MCNC: 11.7 G/DL (ref 11.7–15.7)
SODIUM SERPL-SCNC: 127 MMOL/L (ref 135–145)
SODIUM SERPL-SCNC: 128 MMOL/L (ref 135–145)
SODIUM SERPL-SCNC: 129 MMOL/L (ref 135–145)

## 2023-12-01 PROCEDURE — 99232 SBSQ HOSP IP/OBS MODERATE 35: CPT | Performed by: HOSPITALIST

## 2023-12-01 PROCEDURE — 120N000001 HC R&B MED SURG/OB

## 2023-12-01 PROCEDURE — 250N000013 HC RX MED GY IP 250 OP 250 PS 637: Performed by: PHYSICIAN ASSISTANT

## 2023-12-01 PROCEDURE — 85018 HEMOGLOBIN: CPT | Performed by: PHYSICIAN ASSISTANT

## 2023-12-01 PROCEDURE — 250N000013 HC RX MED GY IP 250 OP 250 PS 637: Performed by: HOSPITALIST

## 2023-12-01 PROCEDURE — 84295 ASSAY OF SERUM SODIUM: CPT | Performed by: HOSPITALIST

## 2023-12-01 PROCEDURE — 250N000013 HC RX MED GY IP 250 OP 250 PS 637: Performed by: OBSTETRICS & GYNECOLOGY

## 2023-12-01 PROCEDURE — 36415 COLL VENOUS BLD VENIPUNCTURE: CPT | Performed by: HOSPITALIST

## 2023-12-01 RX ORDER — MULTIVITAMIN,THERAPEUTIC
1 TABLET ORAL DAILY
Status: DISCONTINUED | OUTPATIENT
Start: 2023-12-01 | End: 2023-12-02 | Stop reason: HOSPADM

## 2023-12-01 RX ORDER — ASPIRIN 81 MG/1
81 TABLET ORAL DAILY
Qty: 30 TABLET | Refills: 0 | Status: SHIPPED | OUTPATIENT
Start: 2023-12-01 | End: 2024-01-25

## 2023-12-01 RX ADMIN — ACETAMINOPHEN 650 MG: 325 TABLET, FILM COATED ORAL at 12:13

## 2023-12-01 RX ADMIN — DOCUSATE SODIUM 50 MG AND SENNOSIDES 8.6 MG 1 TABLET: 8.6; 5 TABLET, FILM COATED ORAL at 09:33

## 2023-12-01 RX ADMIN — METOPROLOL TARTRATE 50 MG: 50 TABLET, FILM COATED ORAL at 21:05

## 2023-12-01 RX ADMIN — ACETAMINOPHEN 650 MG: 325 TABLET, FILM COATED ORAL at 05:22

## 2023-12-01 RX ADMIN — METOPROLOL TARTRATE 50 MG: 50 TABLET, FILM COATED ORAL at 09:33

## 2023-12-01 RX ADMIN — DOCUSATE SODIUM 50 MG AND SENNOSIDES 8.6 MG 1 TABLET: 8.6; 5 TABLET, FILM COATED ORAL at 21:05

## 2023-12-01 RX ADMIN — ACETAMINOPHEN 650 MG: 325 TABLET, FILM COATED ORAL at 17:53

## 2023-12-01 RX ADMIN — THERA TABS 1 TABLET: TAB at 12:13

## 2023-12-01 RX ADMIN — CALCIUM CARBONATE (ANTACID) CHEW TAB 500 MG 500 MG: 500 CHEW TAB at 21:04

## 2023-12-01 RX ADMIN — LISINOPRIL 20 MG: 20 TABLET ORAL at 21:05

## 2023-12-01 RX ADMIN — AMLODIPINE BESYLATE 10 MG: 5 TABLET ORAL at 09:33

## 2023-12-01 RX ADMIN — CALCIUM CARBONATE (ANTACID) CHEW TAB 500 MG 500 MG: 500 CHEW TAB at 05:27

## 2023-12-01 ASSESSMENT — ACTIVITIES OF DAILY LIVING (ADL)
ADLS_ACUITY_SCORE: 22

## 2023-12-01 NOTE — PROGRESS NOTES
[facility]    Medicine Progress Note - Hospitalist Service    Date of Admission:  11/27/2023    Assessment & Plan   Tammy Osorio is a 78 year old female admitted on 11/27/2023.   history of HTN, DM type II, ascending aortic aneurysm, tobacco use, COPD and Left adnexal mass; she was admitted on 11/27/2023 for planned resection of this mass.  Hospitalist consulted for medical co-management.      Left adnexal mass s/p ex lap, bilateral salpingo-oophorectomy, and pelvic washings 11/27  - post-op management per Gyn/Onc     Hyponatremia  *Na 134 on 11/28, down to 126 on 11/30  *urine studies 11/29 suggestive of SIADH (possibly due to nausea or pain, also receiving frequent NSAID, less likely sertraline as this is a chronic med)  - improved to 129 on 12/01/23   - continue 1800ml fluid restriction - if not further improved on afternoon Na check will increase restriction to 1500ml    DM type II  No recent A1C  - hold PTA metformin with continued nausea  - increase prandial insulin to 1/10g carb and increase to high dose ssi 12/01/23      HTN  - continue PTA amlodipine, lisinopril, metoprolol  - pressures remain moderately elevated - ?pain and nausea - monitor     COPD  *not on maintenance inhalers    Tobacco dependence  *smoking 0.5-1 pack per day.  - nicorette gum PRN      Recent abnormal EKG  *no recent cardiac symptoms.  *per reports, had nuclear stress study 11/1/23 that was negative for ischemia.    Ascending aortic aneurysm  *per H&P: 5.5 cm on 1/2014 CT scan -> stable.  - follow up with Dr. Maya (CV Surgeon, [facility]) as per routine          Diet: Diet  Advance Diet as Tolerated: Low Fiber  Fluid restriction 1800 ML FLUID    DVT Prophylaxis: Defer to primary service  Andres Catheter: Not present  Lines: None     Cardiac Monitoring: None  Code Status: Full Code      Clinically Significant Risk Factors         # Hyponatremia: Lowest Na = 126 mmol/L in last 2 days, will monitor as  appropriate          # Hypertension: Noted on problem list            # Financial/Environmental Concerns: none         Disposition Plan      Expected Discharge Date: 12/01/2023      Destination: home with family              Shane Chan MD  Hospitalist Service  [facility]  Securely message with [employer] (more info)  Text page via [employer] Paging/Directory   ______________________________________________________________________    Interval History   Reports nausea continues to improve but has no appetite.  Denies abdominal pain or distension, is passing flatus but no BM since surgery.  Tolerating fluid restriction well.  No other complaints.     Physical Exam   Vital Signs: Temp: 98.5  F (36.9  C) Temp src: Oral BP: (!) 155/55 Pulse: 78   Resp: 16 SpO2: 94 % O2 Device: None (Room air)    Weight: 117 lbs 9.6 oz    General Appearance: Thin female in NAD  Respiratory: lungs CTAB  Cardiovascular: RRR, normal s1/s2 without murmur  GI: bowel sounds slightly hypoactive  Skin: no edema   Other: Alert and appropriate, CN grossly intact      Medical Decision Making       25 MINUTES SPENT BY ME on the date of service doing chart review, history, exam, documentation & further activities per the note.  MANAGEMENT DISCUSSED with the following over the past 24 hours: bedside nurse, patient's    Tests ORDERED & REVIEWED in the past 24 hours:  - serial sodium and serial glucose  Medical complexity over the past 24 hours:  - Prescription DRUG MANAGEMENT performed      Data     I have personally reviewed the following data over the past 24 hrs:    N/A  \   11.7   / N/A     129 (L) N/A N/A /  193 (H)   N/A N/A N/A \

## 2023-12-01 NOTE — PLAN OF CARE
Vitals stable. A&O. UP SBA w/ walker, ambulated halls. Minimal abd incision site pain. Low fiber diet w/ 100mL FR, poor appetite. Abd incision w/ a few upper staples removed, packed w/ 2x2 and ABD in place, teaching started, will need to show family tomorrow. Last , awaiting 1400 draw- called lab x2. Plan for discharge to home tomorrow, Gyn Onc cleared for discharge and placed orders, pt DOES NOT need to be seen by them in the AM unless any concerns arise.

## 2023-12-01 NOTE — PROGRESS NOTES
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 Recommended small, frequent meals.      Wound hematoma: staple came out two nights ago, another removed earlier yesterday. There was ongoing bleeding so on call provider called in. Wound was opened up more and bleeding stopped with direct pressure.  Wound is dry today. Will pack with gauze daily. Will get  and [facility] for teaching and have nursing start teaching here. Hgb has remained stable.      DVT prophylaxis:  Lovenox held yesterday due to bleeding.  Discussed with Dr. Scruggs, given bleeding will not discharge with Eliquis, will do daily aspirin daily instead x 4 weeks.      HTN:  Adjust doses of Amlodopine and Lisinopril     Diabetes mellitus:  Metformin on hold until PO intake better.  Continue to follow glucometer readings.  Hospitalist following.    Hyponatremia: Suspect SIADH. 1800ml fluid restriction. Sodium improving, 129 today.  Hospitalist following.     Dispo: later today vs tomorrow. I will back by in early afternoon to reevaluate wound.      Principal Problem:    Pelvic mass  Active Problems:    Ovarian mass, left      Aliza Tellez, PA-C     Interval History   Had more bleeding from wound yesterday evening and on call provider called in.  Bleeding stopped with direct pressure.  She reports passing more flatus yesterday but still not much appetite.  Still having some heartburn. No BM. Ambulated 3x yesterday.     Physical Exam   Temp: 98.5  F (36.9  C) Temp src: Oral BP: (!) 155/55 Pulse: 78   Resp: 16 SpO2: 94 % O2 Device: None (Room air)    Vitals:    11/30/23 0144 11/30/23 0626 12/01/23 0533   Weight: 53.5 kg (118 lb) 53.8 kg (118 lb  11.2 oz) 53.3 kg (117 lb 9.6 oz)     Vital Signs with Ranges  Temp:  [98.4  F (36.9  C)-99.3  F (37.4  C)] 98.5  F (36.9  C)  Pulse:  [29-89] 78  Resp:  [16-17] 16  BP: (144-165)/(55-69) 155/55  SpO2:  [93 %-94 %] 94 %  I/O last 3 completed shifts:  In: -   Out: 700 [Urine:700]    GENERAL: alert, NAD  CV: RRR, no murmurs  RESPIRATORY: no dyspnea, clear bilat  ABDOMEN: non-distended, +BS, soft, non-tender, packing removed from dressing, wound is dry without any active bleeding or oozing, this was repacked   EXTREMITY: no edema, neg Eufemia's  SKIN: warm and dry, no jaundice no rashes  NEURO: cranial nerves II-XII grossly intact, motor exam intact    Medications       acetaminophen  650 mg Oral Q6H     amLODIPine  10 mg Oral Daily     [Held by provider] enoxaparin ANTICOAGULANT  40 mg Subcutaneous Q24H     [Held by provider] ketorolac  15 mg Intravenous Q6H    Or     [Held by provider] ibuprofen  600 mg Oral Q6H     insulin aspart  1-7 Units Subcutaneous TID AC     insulin aspart  1-5 Units Subcutaneous At Bedtime     insulin aspart   Subcutaneous TID AC     lisinopril  20 mg Oral QPM     metoprolol tartrate  50 mg Oral BID     senna-docusate  1 tablet Oral BID     [Held by provider] sertraline  50 mg Oral QPM     silver nitrate   Topical Once     sodium chloride (PF)  3 mL Intracatheter Q8H       Data   Results for orders placed or performed during the hospital encounter of 11/27/23 (from the past 24 hour(s))   Glucose by meter   Result Value Ref Range    GLUCOSE BY METER POCT 183 (H) 70 - 99 mg/dL   Sodium   Result Value Ref Range    Sodium 128 (L) 135 - 145 mmol/L   Glucose by meter   Result Value Ref Range    GLUCOSE BY METER POCT 228 (H) 70 - 99 mg/dL   Glucose by meter   Result Value Ref Range    GLUCOSE BY METER POCT 210 (H) 70 - 99 mg/dL   Sodium   Result Value Ref Range    Sodium 126 (L) 135 - 145 mmol/L   Glucose by meter   Result Value Ref Range    GLUCOSE BY METER POCT 193 (H) 70 - 99 mg/dL   Sodium   Result  Value Ref Range    Sodium 129 (L) 135 - 145 mmol/L   Hemoglobin   Result Value Ref Range    Hemoglobin 11.7 11.7 - 15.7 g/dL   Glucose by meter   Result Value Ref Range    GLUCOSE BY METER POCT 201 (H) 70 - 99 mg/dL

## 2023-12-01 NOTE — PLAN OF CARE
Goal Outcome Evaluation:    0630-1640  Orientation: AOx4  Activity: SBA GB/W, ambulated in hallway 1x this shift.  Diet/BS Checks: Low fiber with 1800 ml fluid restriction, poor appetite. BS checks with meals and HS, sliding scale and carb counting insulin  Tele:  N/A  IV Access/Drains: R PIV SL.  Pain Management: denies pain. Pt declining 1800 dose of scheduled tylenol   Abnormal VS/Results: VSS on RA ex HTN, did not meet PRN BP med parameters. Tmax 99.3. Na 128, serial rechecks q 8 hrs, next at 2200.  Bowel/Bladder: continent, minimal UOP. BS+, no BM this shift.  Skin/Wounds: Mid line incision with packing strips, ABD with small amount of serosanguinous drainage - changed 1x this shift. Per GynOnc orders, okay to leave packing strips and add more gauze if dressing saturated.  Consults: GynOnc  D/C Disposition: pending  Other Info: Pt reports nausea improved from yesterday. Hold lovenox per GynOnc d/t incisional bleeding.

## 2023-12-01 NOTE — PLAN OF CARE
Goal Outcome Evaluation:    2416-7599  Orientation: AOx4  Activity: SBA, GB/W,   Diet/BS Checks: Low fiber diet/ fluid restriction 1800mL per shift.  BS checks with meals and HS with sliding scale insulin.  Tele: N/A   IV Access/Drains: MARIA CORTES.  Pain Management: Denies pain this shift/ scheduled tylenol.  VS/Results: VSS exc HTN on RA, Na 128, rechecked 126.  Bowel/Bladder: Continent of B/B with minimal urine output, no BM this shift, BSC present.  Skin/Wounds: Abd blood clot, DR was notified, See DR's note.   Consults: Gyn/Onc  D/C Disposition: pending clinical improvement.  Other Info: No Nausea this shift.

## 2023-12-01 NOTE — CONSULTS
"CLINICAL NUTRITION SERVICES  -  ASSESSMENT NOTE    RECOMMENDATIONS FOR MD/PROVIDER TO ORDER:   Recommend liberalizing diet to Regular (currently low fiber) d/t minimal PO intakes   Recommendations Ordered by Registered Dietitian (RD):   Ordered Magic Cup vanilla at 10am and 2pm  Ordered MVI/M   Malnutrition:   % Weight Loss:  Unable to determine, limited data  % Intake:  <75% for >/= 3 months (moderate malnutrition) - chronic  Subcutaneous Fat Loss:  Upper arm region Moderate depletion  Muscle Loss:  Temporal region Mild-Moderate depletion, Clavicle bone region Severe depletion, and Dorsal hand region Moderate-Severe depletion  Fluid Retention:  None noted    Malnutrition Diagnosis: Moderate malnutrition  In Context of:  Acute illness or injury  Chronic illness or disease     REASON FOR ASSESSMENT  Tammy Osorio is a 78 year old female seen by Registered Dietitian for Provider Order - \"Poor appetite after surgery\"    PMH of T2DM, HTN, ascending aortic aneurysm, tobacco use, COPD and left adnexal mass. Admitted 11/27 for planned resection of this mass.    NUTRITION HISTORY    - Spoke with patient at bedside. Per patient, she was eating fine, normal prior to admission. She said she hasn't had an appetite ever since surgery a few days ago. She said things just taste differently (couldn't tolerate popsicle yesterday however tolerating today). She also reported she doesn't eat much at baseline, \"I eat to exist, my  exists to eat\". She said she hasn't noticed any weight loss recently. Writer discussed importance of meeting kcal and protein needs at this time, especially protein to preserve lean muscle mass. Patient willing to try magic cup vanilla between meals. Writer offered support and encouragement with eating moving forward.    CURRENT NUTRITION ORDERS  Diet Order: Low Fiber; 1800 mL Fluid Restriction    Current Intake/Tolerance:  - 0-100% intakes documented per flowsheets  - Ordering 2-3 meals/day per " "[facility]  - Has had minimal intake since surgery per chart review  - Per Gyn/Onc note 11/30, \"continue regular diet, some acid reflux. Recommended small, frequent meals.\"  - Last BM PTA    NUTRITION FOCUSED PHYSICAL ASSESSMENT FOR DIAGNOSING MALNUTRITION)  Yes           Observed:    Muscle wasting (refer to documentation in Malnutrition section) and Subcutaneous fat loss (refer to documentation in Malnutrition section)    ANTHROPOMETRICS  Height: 5' 6\"  Weight: 50.2 kg (110 lb 9.6 oz)  BMI 17.8  Weight Status:  Underweight BMI <18.5  IBW: 59.1 kg  % IBW: 85%  Weight History: limited data, 5% wt loss in 11 months  Wt Readings from Last 10 Encounters:   12/01/23 53.3 kg (117 lb 9.6 oz)   Per Care everywhere:  56.2 kg (124 lb) 01/25/2023    LABS  Na 129 (L), -228 (H)    MEDICATIONS  - High sliding scale insulin, senokot  - Metformin on hold until PO intake better    ASSESSED NUTRITION NEEDS PER APPROVED PRACTICE GUIDELINES:  Dosing Weight 50.2 kg  Estimated Energy Needs: 5537-7672 kcals (30-35 Kcal/Kg)  Justification: repletion and underweight  Estimated Protein Needs: 60-75 grams protein (1.2-1.5 g pro/Kg)  Justification: Repletion and preservation of lean body mass  Estimated Fluid Needs: 1 mL/kcal or per provider pending fluid status    NUTRITION DIAGNOSIS:  Inadequate oral intake related to poor appetite post surgery, restricted diet as evidenced by pt report, current low fiber diet and fluid restriction, need for ONS to help meet nutritional needs    NUTRITION INTERVENTIONS  Recommendations / Nutrition Prescription  See above    Implementation  Nutrition education: discussed importance of meeting kcal and protein needs in acute setting, introduced role of ONS  Medical Food Supplement - ordered  Multivitamin/Mineral - ordered    Nutrition Goals  Patient to consume >50% of nutritionally adequate meal trays TID, or the equivalent with supplements/snacks.    MONITORING AND EVALUATION:  Progress towards goals " will be monitored and evaluated per protocol and Practice Guidelines    Cristina Rob RD, LD  Clinical Dietitian - [facility]

## 2023-12-01 NOTE — PROGRESS NOTES
GYN ONC  Came to bedside to evaluate wound.  Notified by RN that there has been continued bleeding from the wound throughout the day and that the bleeding had increased.  Several dressing changes with saturated dressings.  The dressing was removed including the nugauze strip.  Constant pooling of blood encountered from the defect.  Few more staples were removed to better evaluate the wound.  Fascia was probed with a Qtip and noted to be intact.  These was clot within the wound and though it was still bleeding, it was very difficult to see the source of bleeding in this bedside setting.  I placed moistened kerlex into the wound and held pressure for at least 10 minutes.  Re-evaluated and there was no further bleeding.  A fresh piece of moistened kerlex was packed into the wound bed.  The wound was monitored a little longer and no bleeding noted.  A pressure dressing was then placed over the packed wound.  Patient tolerated this well.  Linda De La Vega

## 2023-12-01 NOTE — PLAN OF CARE
Goal Outcome Evaluation:    2986-7726  Orientation: AOx4  Activity: SBA, GB/W,   Diet/BS Checks: Low fiber diet and fluid restrict to 1800ml per shift  BS checks with meals and HS with sliding scale insulin.  Tele: N/A   IV Access/Drains: R. RAJEEV SL  Pain Management: Denies pain this shift/ scheduled tylenol.  VS/Results: VSS exc HTN on RA, Na 128, rechecked 126.  Bowel/Bladder: Continent of B/B with minimal urine output, no BM this shift, BSC present.  Skin/Wounds: Abd blood clot, DR was notified, See DR's note.   Consults: Gyn/Onc  D/C Disposition: pending clinical improvement.  Other Info:  Nausea this shift, PRN tums given. Tolerated PO meds.

## 2023-12-01 NOTE — PROGRESS NOTES
[facility]     Met with patient and spouse Fahad to discuss plans for home care services at discharge. Discharge today or tomorrow depending on lab results (per patient report). Patient agrees to have [facility] provide SN services.  Patient care support center processing referral.  Patient verbalized understanding nurse visits are intermittent and will occur a few times a week, not daily. Patient and spouse verbalize understanding that spouse and daughter Roxane should receive education from bedside nurse regarding wound care, wound packing and dressing changes prior to discharge. Patient and spouse verbalize understanding that they will receive a phone call from [facility] to schedule initial home care visit. Anticipated start of care date is within 24-48 hours of discharge. Patient and spouse provided with 24 hour phone number for [facility] to call with any questions or concerns.

## 2023-12-01 NOTE — PROGRESS NOTES
Gyn Onc brief progress note    Went to check in on patient.  Wound continues to be dry with dressing change. Has only ambulated once. Repeat sodium pending. Discussed discharge today however patient feels more comfortable with staying the night.  If no issues with wound overnight, ok to discharge tomorrow morning without being seen by surgery. If any concerns with wound or postoperative course please reach out to [facility] call team and we will evaluate patient in the morning.     Aliza Tellez PA-C on 12/1/2023 at 4:00 PM

## 2023-12-02 VITALS
HEIGHT: 66 IN | BODY MASS INDEX: 18.9 KG/M2 | OXYGEN SATURATION: 96 % | RESPIRATION RATE: 18 BRPM | WEIGHT: 117.6 LBS | TEMPERATURE: 98.7 F | SYSTOLIC BLOOD PRESSURE: 140 MMHG | DIASTOLIC BLOOD PRESSURE: 61 MMHG | HEART RATE: 88 BPM

## 2023-12-02 LAB — SODIUM SERPL-SCNC: 130 MMOL/L (ref 135–145)

## 2023-12-02 PROCEDURE — 99232 SBSQ HOSP IP/OBS MODERATE 35: CPT | Performed by: INTERNAL MEDICINE

## 2023-12-02 PROCEDURE — 36415 COLL VENOUS BLD VENIPUNCTURE: CPT | Performed by: HOSPITALIST

## 2023-12-02 PROCEDURE — 84295 ASSAY OF SERUM SODIUM: CPT | Performed by: HOSPITALIST

## 2023-12-02 PROCEDURE — 250N000013 HC RX MED GY IP 250 OP 250 PS 637: Performed by: INTERNAL MEDICINE

## 2023-12-02 PROCEDURE — 250N000013 HC RX MED GY IP 250 OP 250 PS 637: Performed by: HOSPITALIST

## 2023-12-02 PROCEDURE — 250N000013 HC RX MED GY IP 250 OP 250 PS 637: Performed by: PHYSICIAN ASSISTANT

## 2023-12-02 PROCEDURE — 250N000013 HC RX MED GY IP 250 OP 250 PS 637: Performed by: OBSTETRICS & GYNECOLOGY

## 2023-12-02 RX ORDER — FAMOTIDINE 10 MG
10 TABLET ORAL 2 TIMES DAILY PRN
Qty: 60 TABLET | Refills: 1 | Status: ON HOLD | OUTPATIENT
Start: 2023-12-02 | End: 2023-12-22

## 2023-12-02 RX ORDER — FAMOTIDINE 10 MG
10 TABLET ORAL 2 TIMES DAILY PRN
Status: DISCONTINUED | OUTPATIENT
Start: 2023-12-02 | End: 2023-12-02 | Stop reason: HOSPADM

## 2023-12-02 RX ORDER — METFORMIN HCL 500 MG
1000 TABLET, EXTENDED RELEASE 24 HR ORAL 2 TIMES DAILY WITH MEALS
Status: ON HOLD
Start: 2023-12-02 | End: 2023-12-22

## 2023-12-02 RX ORDER — ONDANSETRON 4 MG/1
4 TABLET, ORALLY DISINTEGRATING ORAL EVERY 6 HOURS PRN
Qty: 30 TABLET | Refills: 0 | Status: SHIPPED | OUTPATIENT
Start: 2023-12-02

## 2023-12-02 RX ADMIN — DOCUSATE SODIUM 50 MG AND SENNOSIDES 8.6 MG 1 TABLET: 8.6; 5 TABLET, FILM COATED ORAL at 08:35

## 2023-12-02 RX ADMIN — ACETAMINOPHEN 650 MG: 325 TABLET, FILM COATED ORAL at 06:14

## 2023-12-02 RX ADMIN — THERA TABS 1 TABLET: TAB at 08:35

## 2023-12-02 RX ADMIN — CALCIUM CARBONATE (ANTACID) CHEW TAB 500 MG 500 MG: 500 CHEW TAB at 00:48

## 2023-12-02 RX ADMIN — CALCIUM CARBONATE (ANTACID) CHEW TAB 500 MG 500 MG: 500 CHEW TAB at 06:17

## 2023-12-02 RX ADMIN — METOPROLOL TARTRATE 50 MG: 50 TABLET, FILM COATED ORAL at 08:35

## 2023-12-02 RX ADMIN — FAMOTIDINE 10 MG: 10 TABLET ORAL at 08:39

## 2023-12-02 RX ADMIN — AMLODIPINE BESYLATE 10 MG: 5 TABLET ORAL at 08:35

## 2023-12-02 RX ADMIN — ACETAMINOPHEN 650 MG: 325 TABLET, FILM COATED ORAL at 00:37

## 2023-12-02 ASSESSMENT — ACTIVITIES OF DAILY LIVING (ADL)
ADLS_ACUITY_SCORE: 22

## 2023-12-02 NOTE — PROGRESS NOTES
[facility]    Internal Medicine Hospitalist Progress Note  12/02/2023  I evaluated patient on the above date.    Gus Solorzano Jr., MD  [phone] (p)  Text Page  Vocera        Assessment & Plan New actions/orders today (12/02/2023) are underlined. All lab results in the assessment and plan were reviewed.    Tammy Osorio is a 78 year old female with history including HTN, DM type II, COPD, ascending aortic aneurysm, tobacco use and left adnexal mass; she was admitted on 11/27/2023 for planned resection of this mass. Hospitalist consulted for medical co-management.      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   - Post-op management per Gyn-Onc.     Hyponatremia, suspect SIADH component (possibly due to nausea or pain, also receiving frequent NSAID, less likely sertraline as this is a chronic med).  * Sodium mildly low at 134 on admit 11/28.  * Urine studies 11/29 suggestive of SIADH  * Sodium down to 126 on 11/30; started on fluid restriction.  * Sodium improved to 130 on 12/2.  Recent Labs   Lab 12/02/23  0650 12/01/23  2254 12/01/23  1854 12/01/23  0702 11/30/23  2313 11/30/23  1528   * 127* 128* 129* 126* 128*   - Continue 1800 ml fluid restriction - continue at discharge.  - Continue to monitor BMP - follow-up outpatient.    Heartburn/GERD.  * Noted 12/2 that pt had symptoms overnight.  - Order PRN famotidine.    DM type II.  [PTA: metformin XR 1000 mg BID with meals.]  * No recent A1C (was 7.1 in 2020).  Recent Labs   Lab 12/01/23  2249 12/01/23  1710 12/01/23  1323 12/01/23  1008 12/01/23  0756 12/01/23  0215   GLC  220* 201* 188* 193* 201* 193*   - Continue aspart 1U/10 gm CHO with meals (increased 12/1).  - Continue aspart ISS (high).  - Continue to hold PTA metformin with continued nausea - hold at discharge; restart when eating better.     Hypertension (benign essential).  [PTA: amlodipine 10 mg daily; lisinopril 20 mg daily; metoprolol 50 mg BID.]  * Pressures elevated at times this hospitalization, suspect related to pain/discomfort/nausea.  - Continue PTA amlodipine, lisinopril, and metoprolol.  - Continue PRN clonidine.  - Continue to treat other issues as noted.     COPD.  Tobacco dependence  * Smoking 0.5-1 pack per day. Not on maintenance inhalers PTA.  - Continue PRN nicotine gum.  - Strongly recommend tobacco cessation.    Recent abnormal EKG with negative nuc stress test (11/1/2023).  * Noted PTA. No recent cardiac symptoms. Per reports, had nuclear stress study 11/1/23 that was negative for ischemia.  - Noted.    Ascending aortic aneurysm.  * Per H&P: 5.5 cm on 1/2014 CT scan -> stable.  - Continue metoprolol.  - Strongly recommend tobacco cessation.  - Follow-up with Dr. Maya (CV Surgeon, [facility]) as per routine.    Clinically Significant Risk Factors         # Hyponatremia: Lowest Na = 126 mmol/L in last 2 days, will monitor as appropriate          # Hypertension: Noted on problem list         # Moderate Malnutrition: based on nutrition assessment    # Financial/Environmental Concerns: none           COVID-19 testing.  COVID-19 PCR Results           No data to display              COVID-19 Antibody Results, Testing for Immunity           No data to display                Diet: Diet  Advance Diet as Tolerated: Low Fiber  Fluid restriction 1800 ML FLUID  Snacks/Supplements Adult: Magic Cup; Between Meals    Prophylaxis: PCD's, ambulation.   Andres Catheter: Not present  Lines: None     Code Status: Full Code    Disposition Plan   Expected discharge: Today to prior living arrangement per Gyn-Onc. OK to  "discontinue from IM standpoint. Discharge orders reviewed and adjusted.  Entered: Gus Solorzano MD 12/02/2023, 7:39 AM         Interval History   Doing better.  Still not eating much with some nausea at times, but no vomiting.    -Data reviewed today: I reviewed all new labs and imaging over the last 24 hours. I personally reviewed no images or EKG's today.    Physical Exam    , Blood pressure (!) 163/59, pulse 89, temperature 98.1  F (36.7  C), resp. rate 18, height 1.676 m (5' 6\"), weight 53.3 kg (117 lb 9.6 oz), SpO2 95%. O2 Device: None (Room air)    Vitals:    11/30/23 0144 11/30/23 0626 12/01/23 0533   Weight: 53.5 kg (118 lb) 53.8 kg (118 lb 11.2 oz) 53.3 kg (117 lb 9.6 oz)     Vital Signs with Ranges  Temp:  [98.1  F (36.7  C)-99  F (37.2  C)] 98.1  F (36.7  C)  Pulse:  [78-89] 89  Resp:  [16-20] 18  BP: (150-163)/(55-59) 163/59  SpO2:  [94 %-95 %] 95 %  Patient Vitals for the past 24 hrs:   BP Temp Temp src Pulse Resp SpO2   12/01/23 2100 (!) 163/59 98.1  F (36.7  C) -- 89 18 95 %   12/01/23 1710 (!) 150/55 99  F (37.2  C) Oral 79 20 95 %   12/01/23 0759 (!) 155/55 98.5  F (36.9  C) Oral 78 16 94 %     I/O's Last 24 hours  I/O last 3 completed shifts:  In: 600 [P.O.:600]  Out: 550 [Urine:550]    Constitutional: Awake, alert, oriented, pleasant.  Respiratory: Diminished in bases. No crackles or wheezes.  Cardiovascular: RRR, no m/r/g.  GI: Mild distension, no r/g tenderness, few BS.  Skin/Integumen:   Other:        Data    Labs reviewed.  Recent Labs   Lab 12/01/23 2254 12/01/23 2249 12/01/23  1854 12/01/23  1710 12/01/23  1323 12/01/23  0756 12/01/23  0702 11/30/23  1248 11/30/23  0842 11/29/23  1410 11/29/23  1009 11/28/23  1237 11/28/23  0817   WBC  --   --   --   --   --   --   --   --   --   --   --   --  9.1   HGB  --   --   --   --   --   --  11.7  --   --   --  12.6  --  12.5   MCV  --   --   --   --   --   --   --   --   --   --   --   --  93   PLT  --   --   --   --   --   --   --   --  " "322  --   --   --  253   *  --  128*  --   --   --  129*   < > 126*   < > 130*  --  134*   POTASSIUM  --   --   --   --   --   --   --   --  4.1  --  4.8  --  4.5   CHLORIDE  --   --   --   --   --   --   --   --  94*  --  99  --  101   CO2  --   --   --   --   --   --   --   --  23  --  22  --  24   BUN  --   --   --   --   --   --   --   --  21.9  --  14.6  --  18.3   CR  --   --   --   --   --   --   --   --  0.63  --  0.58  --  0.75   ANIONGAP  --   --   --   --   --   --   --   --  9  --  9  --  9   ARCHANA  --   --   --   --   --   --   --   --  8.7*  --  8.6*  --  8.7*   GLC  --  220*  --  201* 188*   < >  --    < > 202*   < > 159*   < > 146*    < > = values in this interval not displayed.     No lab results found.  Recent Labs   Lab 12/01/23  2249 12/01/23  1710 12/01/23  1323 12/01/23  1008 12/01/23  0756 12/01/23  0215   * 201* 188* 193* 201* 193*      No lab results found.     No results for input(s): \"INR\", \"[id number]\" in the last 168 hours.  Recent Labs   Lab 11/28/23  0817   WBC 9.1       MICRO:  CULTURES (INCLUDING BLOOD AND URINE):  No lab results found in last 7 days.    No results found for this or any previous visit (from the past 24 hour(s)).    Medications   All medications were reviewed.    Infusions:    Scheduled Medications:   acetaminophen  650 mg Oral Q6H    amLODIPine  10 mg Oral Daily    insulin aspart  1-10 Units Subcutaneous TID AC    insulin aspart  1-7 Units Subcutaneous At Bedtime    insulin aspart   Subcutaneous TID AC    lisinopril  20 mg Oral QPM    metoprolol tartrate  50 mg Oral BID    multivitamin, therapeutic  1 tablet Oral Daily    senna-docusate  1 tablet Oral BID    [Held by provider] sertraline  50 mg Oral QPM    silver nitrate   Topical Once    sodium chloride (PF)  3 mL Intracatheter Q8H     PRN Medications:  sore throat, calcium carbonate, cloNIDine, glucose **OR** dextrose **OR** glucagon, HYDROmorphone **OR** HYDROmorphone, lidocaine 4%, lidocaine " (buffered or not buffered), magnesium hydroxide, naloxone **OR** naloxone **OR** naloxone **OR** naloxone, nicotine, ondansetron **OR** ondansetron, prochlorperazine **OR** prochlorperazine, simethicone, sodium chloride (PF), traMADol

## 2023-12-02 NOTE — PLAN OF CARE
Pt discharged to home with . Incision dressing teaching completed with pt and spouse. Dressing change supplies sent with pt. PIV removed. Paperwork completed. Prescriptions and belongings sent with pt. Plan for [facility] to see pt this week.

## 2023-12-02 NOTE — CONSULTS
Care Management Discharge Note    Discharge Date: 12/02/2023       Discharge Disposition: Home, Home Care    Discharge Services: None    Discharge DME: None    Discharge Transportation:      Private pay costs discussed: Not applicable    Does the patient's insurance plan have a 3 day qualifying hospital stay waiver?  No    PAS Confirmation Code:  n/a  Patient/family educated on Medicare website which has current facility and service quality ratings: no    Education Provided on the Discharge Plan: Yes (Wound care by bedside RN)  Persons Notified of Discharge Plans: patient, bedside RN  Patient/Family in Agreement with the Plan: yes    Handoff Referral Completed: no    Additional Information:  Writer met with the patient at the bedside. Patient is A+Ox4 and cleared for discharge to home with Homecare Skilled RN and Follow up with [facility] providers.  Patient is aware that wound care and homecare information is on her discharge orders and that homecare will call her within 48 hours to schedule assessment and start of homecare.  Patient is aware bedside will teach her and spouse wound cares, and supplies to be sent with the patient.  Patient identified no further needs at discharge.  Writer sent a message to bedside RN to confirm that he Wound care orders are accurate in AVS.    Cydney Kaplan RN, BSN, ACM   Care Transitions Specialist  [facility]  Care Transitions Specialist  [id number] [email or web address]  [email or web address]  Office: [phone] Fax: [phone]  [facility]

## 2023-12-02 NOTE — PROVIDER NOTIFICATION
MD Notification    Notified Person: MD    Notified Person Name: Judith     Notification Date/Time: 12/1/23 2005    Notification Interaction: Vocera     Purpose of Notification: Pt Na+ is 128.     Orders Received:     Comments:

## 2023-12-02 NOTE — PLAN OF CARE
[id number]  Orientation: AO x4.   Activity: SBA  Diet/BS Checks: Low fiber diet with 1800 ml FR. BG checks at meals and bedtime.   Tele:  N/a.   IV Access/Drains: PIV SL.   Pain Management: Scheduled tylenol.   Abnormal VS/Results: VSS on RA. Last Na is 127. Recheck this AM @ 0600.  Bowel/Bladder: Continent of B/B. No BM this shift.   Skin/Wounds: Midline abd incision. Covered with ABD pad- CDI.   Consults: GynOnc  D/C Disposition: Plan to discharge today after patient/family education on dressing changes.   Other Info: PRN tums x2.

## 2023-12-18 ENCOUNTER — HOSPITAL ENCOUNTER (INPATIENT)
Facility: CLINIC | Age: 78
LOS: 4 days | Discharge: HOME | DRG: 356 | End: 2023-12-22
Attending: EMERGENCY MEDICINE | Admitting: STUDENT IN AN ORGANIZED HEALTH CARE EDUCATION/TRAINING PROGRAM
Payer: MEDICARE

## 2023-12-18 ENCOUNTER — APPOINTMENT (OUTPATIENT)
Dept: CT IMAGING | Facility: CLINIC | Age: 78
DRG: 356 | End: 2023-12-18
Attending: EMERGENCY MEDICINE
Payer: MEDICARE

## 2023-12-18 DIAGNOSIS — R00.0 SINUS TACHYCARDIA: ICD-10-CM

## 2023-12-18 DIAGNOSIS — E11.9 CONTROLLED TYPE 2 DIABETES MELLITUS WITHOUT COMPLICATION, WITHOUT LONG-TERM CURRENT USE OF INSULIN (H): ICD-10-CM

## 2023-12-18 DIAGNOSIS — I48.91 ATRIAL FIBRILLATION WITH RVR (H): ICD-10-CM

## 2023-12-18 DIAGNOSIS — Z86.79 HISTORY OF AORTIC ANEURYSM: ICD-10-CM

## 2023-12-18 DIAGNOSIS — K55.1 SUPERIOR MESENTERIC ARTERY STENOSIS (H): Primary | ICD-10-CM

## 2023-12-18 DIAGNOSIS — K26.9 MULTIPLE DUODENAL ULCERS: ICD-10-CM

## 2023-12-18 DIAGNOSIS — D62 ANEMIA DUE TO BLOOD LOSS, ACUTE: ICD-10-CM

## 2023-12-18 LAB
ABO/RH(D): NORMAL
ALBUMIN SERPL BCG-MCNC: 3.4 G/DL (ref 3.5–5.2)
ALP SERPL-CCNC: 90 U/L (ref 40–150)
ALT SERPL W P-5'-P-CCNC: 11 U/L (ref 0–50)
ANION GAP SERPL CALCULATED.3IONS-SCNC: 10 MMOL/L (ref 7–15)
ANTIBODY SCREEN: NEGATIVE
AST SERPL W P-5'-P-CCNC: 21 U/L (ref 0–45)
ATRIAL RATE - MUSE: NORMAL BPM
BILIRUB DIRECT SERPL-MCNC: <0.2 MG/DL (ref 0–0.3)
BILIRUB SERPL-MCNC: 0.5 MG/DL
BLD PROD TYP BPU: NORMAL
BLD PROD TYP BPU: NORMAL
BLOOD COMPONENT TYPE: NORMAL
BLOOD COMPONENT TYPE: NORMAL
BUN SERPL-MCNC: 20.6 MG/DL (ref 8–23)
CALCIUM SERPL-MCNC: 9.1 MG/DL (ref 8.8–10.2)
CHLORIDE SERPL-SCNC: 92 MMOL/L (ref 98–107)
CODING SYSTEM: NORMAL
CODING SYSTEM: NORMAL
CREAT SERPL-MCNC: 0.7 MG/DL (ref 0.51–0.95)
CROSSMATCH: NORMAL
CROSSMATCH: NORMAL
DEPRECATED HCO3 PLAS-SCNC: 27 MMOL/L (ref 22–29)
DIASTOLIC BLOOD PRESSURE - MUSE: NORMAL MMHG
EGFRCR SERPLBLD CKD-EPI 2021: 88 ML/MIN/1.73M2
ERYTHROCYTE [DISTWIDTH] IN BLOOD BY AUTOMATED COUNT: 15 % (ref 10–15)
GLUCOSE BLDC GLUCOMTR-MCNC: 199 MG/DL (ref 70–99)
GLUCOSE SERPL-MCNC: 306 MG/DL (ref 70–99)
HCT VFR BLD AUTO: 16.3 % (ref 35–47)
HGB BLD-MCNC: 4.9 G/DL (ref 11.7–15.7)
INTERPRETATION ECG - MUSE: NORMAL
IRON BINDING CAPACITY (ROCHE): 417 UG/DL (ref 240–430)
IRON SATN MFR SERPL: 4 % (ref 15–46)
IRON SERPL-MCNC: 16 UG/DL (ref 37–145)
ISSUE DATE AND TIME: NORMAL
ISSUE DATE AND TIME: NORMAL
LDH SERPL L TO P-CCNC: 153 U/L (ref 0–250)
MCH RBC QN AUTO: 29 PG (ref 26.5–33)
MCHC RBC AUTO-ENTMCNC: 30.1 G/DL (ref 31.5–36.5)
MCV RBC AUTO: 96 FL (ref 78–100)
P AXIS - MUSE: NORMAL DEGREES
PLATELET # BLD AUTO: 356 10E3/UL (ref 150–450)
POTASSIUM SERPL-SCNC: 4.5 MMOL/L (ref 3.4–5.3)
PR INTERVAL - MUSE: NORMAL MS
PROT SERPL-MCNC: 6 G/DL (ref 6.4–8.3)
QRS DURATION - MUSE: 72 MS
QT - MUSE: 354 MS
QTC - MUSE: 463 MS
R AXIS - MUSE: 10 DEGREES
RBC # BLD AUTO: 1.69 10E6/UL (ref 3.8–5.2)
RETICS # AUTO: 0.23 10E6/UL (ref 0.03–0.1)
RETICS/RBC NFR AUTO: 13.6 % (ref 0.5–2)
SODIUM SERPL-SCNC: 129 MMOL/L (ref 135–145)
SPECIMEN EXPIRATION DATE: NORMAL
SYSTOLIC BLOOD PRESSURE - MUSE: NORMAL MMHG
T AXIS - MUSE: 110 DEGREES
TROPONIN T SERPL HS-MCNC: 46 NG/L
TROPONIN T SERPL HS-MCNC: 46 NG/L
TSH SERPL DL<=0.005 MIU/L-ACNC: 3.53 UIU/ML (ref 0.3–4.2)
UNIT ABO/RH: NORMAL
UNIT ABO/RH: NORMAL
UNIT NUMBER: NORMAL
UNIT NUMBER: NORMAL
UNIT STATUS: NORMAL
UNIT STATUS: NORMAL
UNIT TYPE ISBT: 9500
UNIT TYPE ISBT: 9500
VENTRICULAR RATE- MUSE: 103 BPM
WBC # BLD AUTO: 11.8 10E3/UL (ref 4–11)

## 2023-12-18 PROCEDURE — 80053 COMPREHEN METABOLIC PANEL: CPT | Performed by: EMERGENCY MEDICINE

## 2023-12-18 PROCEDURE — 82962 GLUCOSE BLOOD TEST: CPT

## 2023-12-18 PROCEDURE — 36430 TRANSFUSION BLD/BLD COMPNT: CPT

## 2023-12-18 PROCEDURE — 80048 BASIC METABOLIC PNL TOTAL CA: CPT | Performed by: EMERGENCY MEDICINE

## 2023-12-18 PROCEDURE — 83615 LACTATE (LD) (LDH) ENZYME: CPT | Performed by: STUDENT IN AN ORGANIZED HEALTH CARE EDUCATION/TRAINING PROGRAM

## 2023-12-18 PROCEDURE — 84484 ASSAY OF TROPONIN QUANT: CPT | Performed by: EMERGENCY MEDICINE

## 2023-12-18 PROCEDURE — 84484 ASSAY OF TROPONIN QUANT: CPT | Performed by: STUDENT IN AN ORGANIZED HEALTH CARE EDUCATION/TRAINING PROGRAM

## 2023-12-18 PROCEDURE — 85027 COMPLETE CBC AUTOMATED: CPT | Performed by: EMERGENCY MEDICINE

## 2023-12-18 PROCEDURE — 84443 ASSAY THYROID STIM HORMONE: CPT | Performed by: STUDENT IN AN ORGANIZED HEALTH CARE EDUCATION/TRAINING PROGRAM

## 2023-12-18 PROCEDURE — 93005 ELECTROCARDIOGRAM TRACING: CPT

## 2023-12-18 PROCEDURE — 82607 VITAMIN B-12: CPT | Performed by: STUDENT IN AN ORGANIZED HEALTH CARE EDUCATION/TRAINING PROGRAM

## 2023-12-18 PROCEDURE — 86923 COMPATIBILITY TEST ELECTRIC: CPT | Performed by: STUDENT IN AN ORGANIZED HEALTH CARE EDUCATION/TRAINING PROGRAM

## 2023-12-18 PROCEDURE — 82040 ASSAY OF SERUM ALBUMIN: CPT | Performed by: STUDENT IN AN ORGANIZED HEALTH CARE EDUCATION/TRAINING PROGRAM

## 2023-12-18 PROCEDURE — 36415 COLL VENOUS BLD VENIPUNCTURE: CPT | Performed by: STUDENT IN AN ORGANIZED HEALTH CARE EDUCATION/TRAINING PROGRAM

## 2023-12-18 PROCEDURE — 250N000011 HC RX IP 250 OP 636: Performed by: EMERGENCY MEDICINE

## 2023-12-18 PROCEDURE — 83550 IRON BINDING TEST: CPT | Performed by: STUDENT IN AN ORGANIZED HEALTH CARE EDUCATION/TRAINING PROGRAM

## 2023-12-18 PROCEDURE — 250N000012 HC RX MED GY IP 250 OP 636 PS 637: Performed by: STUDENT IN AN ORGANIZED HEALTH CARE EDUCATION/TRAINING PROGRAM

## 2023-12-18 PROCEDURE — 99223 1ST HOSP IP/OBS HIGH 75: CPT | Mod: AI | Performed by: STUDENT IN AN ORGANIZED HEALTH CARE EDUCATION/TRAINING PROGRAM

## 2023-12-18 PROCEDURE — 250N000009 HC RX 250: Performed by: EMERGENCY MEDICINE

## 2023-12-18 PROCEDURE — P9016 RBC LEUKOCYTES REDUCED: HCPCS | Performed by: EMERGENCY MEDICINE

## 2023-12-18 PROCEDURE — 83010 ASSAY OF HAPTOGLOBIN QUANT: CPT | Performed by: STUDENT IN AN ORGANIZED HEALTH CARE EDUCATION/TRAINING PROGRAM

## 2023-12-18 PROCEDURE — 99285 EMERGENCY DEPT VISIT HI MDM: CPT | Mod: 25

## 2023-12-18 PROCEDURE — G1010 CDSM STANSON: HCPCS

## 2023-12-18 PROCEDURE — 85045 AUTOMATED RETICULOCYTE COUNT: CPT | Performed by: STUDENT IN AN ORGANIZED HEALTH CARE EDUCATION/TRAINING PROGRAM

## 2023-12-18 PROCEDURE — 120N000001 HC R&B MED SURG/OB

## 2023-12-18 PROCEDURE — 36415 COLL VENOUS BLD VENIPUNCTURE: CPT | Performed by: EMERGENCY MEDICINE

## 2023-12-18 PROCEDURE — 71250 CT THORAX DX C-: CPT | Mod: MG

## 2023-12-18 PROCEDURE — 82728 ASSAY OF FERRITIN: CPT | Performed by: STUDENT IN AN ORGANIZED HEALTH CARE EDUCATION/TRAINING PROGRAM

## 2023-12-18 PROCEDURE — 250N000011 HC RX IP 250 OP 636: Performed by: STUDENT IN AN ORGANIZED HEALTH CARE EDUCATION/TRAINING PROGRAM

## 2023-12-18 PROCEDURE — 86900 BLOOD TYPING SEROLOGIC ABO: CPT | Performed by: EMERGENCY MEDICINE

## 2023-12-18 PROCEDURE — 86923 COMPATIBILITY TEST ELECTRIC: CPT | Performed by: EMERGENCY MEDICINE

## 2023-12-18 PROCEDURE — C9113 INJ PANTOPRAZOLE SODIUM, VIA: HCPCS | Performed by: STUDENT IN AN ORGANIZED HEALTH CARE EDUCATION/TRAINING PROGRAM

## 2023-12-18 RX ORDER — METOPROLOL TARTRATE 1 MG/ML
2.5 INJECTION, SOLUTION INTRAVENOUS EVERY 4 HOURS PRN
Status: DISCONTINUED | OUTPATIENT
Start: 2023-12-18 | End: 2023-12-22 | Stop reason: HOSPADM

## 2023-12-18 RX ORDER — IOPAMIDOL 755 MG/ML
72 INJECTION, SOLUTION INTRAVASCULAR ONCE
Status: COMPLETED | OUTPATIENT
Start: 2023-12-18 | End: 2023-12-18

## 2023-12-18 RX ADMIN — INSULIN ASPART 2 UNITS: 100 INJECTION, SOLUTION INTRAVENOUS; SUBCUTANEOUS at 22:54

## 2023-12-18 RX ADMIN — PANTOPRAZOLE SODIUM 40 MG: 40 INJECTION, POWDER, FOR SOLUTION INTRAVENOUS at 21:55

## 2023-12-18 RX ADMIN — IOPAMIDOL 72 ML: 755 INJECTION, SOLUTION INTRAVENOUS at 18:30

## 2023-12-18 RX ADMIN — SODIUM CHLORIDE 80 ML: 9 INJECTION, SOLUTION INTRAVENOUS at 18:31

## 2023-12-18 ASSESSMENT — ACTIVITIES OF DAILY LIVING (ADL)
ADLS_ACUITY_SCORE: 35

## 2023-12-18 NOTE — ED TRIAGE NOTES
Been feeling dizzy since Thursday, took a fall before going to see a doctor. Having left side of rib pain, had hemoglobin low at doctors appointment. S/p abdomen surgery with benign tumor removal.      Triage Assessment (Adult)       Row Name 12/18/23 3675          Triage Assessment    Airway WDL WDL        Respiratory WDL    Respiratory WDL WDL        Skin Circulation/Temperature WDL    Skin Circulation/Temperature WDL X        Cardiac WDL    Cardiac WDL X        Peripheral/Neurovascular WDL    Peripheral Neurovascular WDL WDL        Cognitive/Neuro/Behavioral WDL    Cognitive/Neuro/Behavioral WDL WDL

## 2023-12-18 NOTE — ED PROVIDER NOTES
History     Chief Complaint:  Dizziness and Abnormal Labs       HPI   Tammy Osorio is a 78 year old female with a history of hypertension, diabetes, left ovarian mass who was seen by her primary care doctor today Dr. Jackson had some blood work drawn and found her hemoglobin to be 5.0 she was sent to this emergency room.  She is status post ex lap, BSO and removal of a 17 pound cyst with a postoperative hematoma at the superior incision she was just put on postop day 5.  She states that since last Thursday she has become very lightheaded and weak and needs assist to walk around.  Denies having any cough, fever, chest pain, shortness of breath.  She did feel lightheaded last Thursday so slid down to the floor but did not fully syncopize she did injure her left ribs.  She did her follow-up today with her doctor and had chest x-ray taken due to her rib pain and this was reportedly negative.  She denies any blood in her stool she has vomited once really has not eaten much since Thursday.  The incision looks good there is been minimal drainage.      Independent Historian:    Patient    Review of External Notes:  Discharge diagnoses from December 2.  The patient was admitted 11 27-12 2 with a left ovarian mass.  Status post ex lap bilateral salpingo-oophorectomy and pelvic washings.  Developed hematoma on the superior aspect of her incision the staples were then removed and the wound is packed.  Hemoglobin remained stable no signs of infection.  Hemoglobin checked on 12 1 was 11.7    Medications:    amLODIPine (NORVASC) 10 MG tablet  aspirin 81 MG EC tablet  famotidine (PEPCID) 10 MG tablet  lisinopril (ZESTRIL) 20 MG tablet  metFORMIN (GLUCOPHAGE XR) 500 MG 24 hr tablet  metoprolol tartrate (LOPRESSOR) 50 MG tablet  ondansetron (ZOFRAN ODT) 4 MG ODT tab  sertraline (ZOLOFT) 50 MG tablet  traMADol (ULTRAM) 50 MG tablet        Past Medical History:    Past Medical History:   Diagnosis Date    Anxiety     Aortic  "regurgitation     Ascending aortic aneurysm (H24)     Diabetes (H)     Hypertension     Osteopenia     Pelvic mass     Pulmonary emphysema (H)     Sciatica, unspecified laterality        Past Surgical History:    Past Surgical History:   Procedure Laterality Date    HYSTERECTOMY TOTAL ABDOMINAL, BILATERAL SALPINGO-OOPHORECTOMY, COMBINED Bilateral 11/27/2023    Procedure: EXPLORATORY LAPAROTOMY , BILATERAL SALPINGO OOPHORECTOMY , PELVIC WASHING;  Surgeon: Naye Scruggs MD;  Location:  OR          Physical Exam   Patient Vitals for the past 24 hrs:   BP Temp Temp src Pulse Resp SpO2 Height Weight   12/18/23 2002 -- -- -- -- -- 97 % -- --   12/18/23 2000 (!) 152/59 -- -- 98 -- -- -- --   12/18/23 1900 (!) 149/52 -- -- 97 -- -- -- --   12/18/23 1835 (!) 143/57 97.9  F (36.6  C) Oral 105 20 100 % -- --   12/18/23 1810 (!) 140/56 97.8  F (36.6  C) Oral 93 20 97 % -- --   12/18/23 1800 (!) 148/55 -- -- 93 -- 98 % -- --   12/18/23 1757 136/51 97.5  F (36.4  C) -- 90 20 -- -- --   12/18/23 1645 (!) 143/49 -- -- -- -- -- -- --   12/18/23 1554 (!) 138/26 97.5  F (36.4  C) Temporal 95 18 96 % 1.676 m (5' 6\") 49.9 kg (110 lb)        Physical Exam    Physical Exam   Constitutional:  Patient is oriented to person, place, and time. They appear well-developed and well-nourished.  HENT:   Mouth/Throat:   Oropharynx is clear and moist.   Eyes:    Conjunctivae normal and EOM are normal. Pupils are equal, round, and reactive to light.   Neck:    Normal range of motion.   Cardiovascular: Normal rate, regular rhythm and normal heart sounds.  Exam reveals no gallop and no friction rub.  No murmur heard.  Pulmonary/Chest:  Effort normal and breath sounds normal. Patient has no wheezes. Patient has no rales.   Abdominal:   Soft. Bowel sounds are normal.  Abdominal incision appears to be clean dry and intact.  The staples are in place.  There is no dehiscence.  Steri-Strips are just starting to come off.  No evidence of cellulitis " purulent drainage.  There is some dependent ecchymosis in the pubic area   musculoskeletal:  Normal range of motion. Patient exhibits no edema.   Is generally weak. No cranial nerve deficit or sensory deficit. GCS 15  Skin:   Skin is warm and dry. No rash noted. No erythema.   Psychiatric:   Patient has a normal mood and affect. Patient's behavior is normal. Judgment and thought content normal.       Emergency Department Course   ECG    ECG results from 12/18/23   EKG 12-lead, tracing only     Value    Systolic Blood Pressure     Diastolic Blood Pressure     Ventricular Rate 103    Atrial Rate     SD Interval     QRS Duration 72        QTc 463    P Axis     R AXIS 10    T Axis 110    Interpretation ECG      Atrial fibrillation with rapid ventricular response  Septal infarct , age undetermined  ST & T wave abnormality, consider anterolateral ischemia  Abnormal ECG  No previous ECGs available  Confirmed by GENERATED REPORT, COMPUTER ([id number]),  Enriqueta Willis ([id number]) on 12/18/2023 6:30:11 PM         Imaging:  CTA Abdomen Pelvis with Contrast   Final Result   IMPRESSION:   1.  Trace abdominopelvic ascites. No large hematoma or evidence of active bleeding within the abdomen or pelvis.   2.  Nearly occlusive left gonadal vein thrombus.   3.  Tiny inferoanterior abdominal wall fluid collection (1.4 cm), likely seroma.   4.  Infrarenal abdominal aortic aneurysm (4.6 cm) with anterior mural thrombus.   5.  Severe celiac artery origin narrowing.   6.  Small left pleural effusion.      Findings were discussed with Dr. Ozuna on 12/18/2023 at 7:23 PM      Chest CT w/o contrast   Final Result   IMPRESSION:    1.  No inflammatory infiltrates.      2.  Mild scattered noncalcified/indeterminate pulmonary nodules with the largest seen measuring 4.9 mm in the left lower lobe. Please refer to below reference for possible follow-up.      3. Mild left pleural effusion which appears dependent in nature.      4. Heavily  calcified thoracic aortic aneurysm with the ascending thoracic aorta measuring approximately 6.2 cm in greatest radial dimension and the descending thoracic aorta 4.2 cm.      REFERENCE:   Guidelines for Management of Incidental Pulmonary Nodules Detected on CT Images: From the Fleischner Society 2017.    Guidelines apply to incidental nodules in patients who are 35 years or older.   Guidelines do not apply to lung cancer screening, patients with immunosuppression, or patients with known primary cancer.      MULTIPLE NODULES   Nodule size <6 mm   Low-risk patients: No follow-up needed.   High-risk patients: Optional follow-up at 12 months.      Nodule size 6 mm or larger   Low-risk patients: Follow-up CT at 3-6 months, then consider CT at 18-24 months.   High-risk patients: Follow-up CT at 3-6 months, then at 18-24 months if no change.   -Use most suspicious nodule as guide to management.        Report per radiology    Laboratory:  Labs Ordered and Resulted from Time of ED Arrival to Time of ED Departure   CBC WITH PLATELETS - Abnormal       Result Value    WBC Count 11.8 (*)     RBC Count 1.69 (*)     Hemoglobin 4.9 (*)     Hematocrit 16.3 (*)     MCV 96      MCH 29.0      MCHC 30.1 (*)     RDW 15.0      Platelet Count 356     BASIC METABOLIC PANEL - Abnormal    Sodium 129 (*)     Potassium 4.5      Chloride 92 (*)     Carbon Dioxide (CO2) 27      Anion Gap 10      Urea Nitrogen 20.6      Creatinine 0.70      GFR Estimate 88      Calcium 9.1      Glucose 306 (*)    TROPONIN T, HIGH SENSITIVITY - Abnormal    Troponin T, High Sensitivity 46 (*)    TYPE AND SCREEN, ADULT    ABO/RH(D) O NEG      Antibody Screen Negative      SPECIMEN EXPIRATION DATE [id number]     PREPARE RED BLOOD CELLS (UNIT)    Blood Component Type Red Blood Cells      Product Code S3626S26      Unit Status Ready for issue      Unit Number [id number]      CROSSMATCH Compatible      CODING SYSTEM [id number]     PREPARE RED BLOOD CELLS (UNIT)     Blood Component Type Red Blood Cells      Product Code L0338A77      Unit Status Transfused      Unit Number [id number]      CROSSMATCH Compatible      CODING SYSTEM [id number]      ISSUE DATE AND TIME [id number]      UNIT ABO/RH O-      UNIT TYPE ISBT 9500     PREPARE RED BLOOD CELLS (UNIT)   TRANSFUSE RED BLOOD CELLS (UNIT)   ABO/RH TYPE AND SCREEN        Procedures   none    Emergency Department Course & Assessments:             Interventions:  Medications   Saline (80 mLs As instructed $Given 12/18/23 1831)   iopamidol (ISOVUE-370) solution 72 mL (72 mLs Intravenous $Given 12/18/23 1830)          Independent Interpretation (X-rays, CTs, rhythm strip):  I reviewed the patient's CT I agree with the aortic aneurysm nonruptured    Consultations/Discussion of Management or Tests:  [address] [place] [facility] [address] Cook    1952 Spoke with Dr. Lowe about patient    Social Determinants of Health affecting care:  None     Disposition:  The patient was admitted to the hospital under the care of Dr. Lowe.     Impression & Plan    CMS Diagnoses: None    Medical Decision Making:  Tammy Osorio is a 70-year-old female presenting to the emergency department with generalized weakness, lightheadedness and left rib pain after she slid down to the ground last Thursday due to her lightheadedness.  At her doctor's office she did have a significantly abnormal hemoglobin as compared to her previous hemoglobin on the first of this month.  An EKG was performed here and it does show atrial fibrillation with mild RVR although her heart rate has come down with the blood transfusion.  We did confirm that her hemoglobin is low with the outside lab work.  I did do a CT of her chest abdomen pelvis because she injured the left ribs upon lowering herself to the ground on Thursday with her weakness and also as she has had recent significant surgery with a hematoma after surgery.  She has a known AAA she is aware of this and I did  discuss this with her but there is no signs of rupture.  She has normal postoperative findings in the pelvis as well but no large hematoma to explain the low hemoglobin.    She did get transfused 2 units of packed red blood cells again her heart rate started to come down.  I suspect that she got very weak due to the significantly low hemoglobin the atrial fibrillation is likely result of this as well she has never had this before.  She otherwise is relatively asymptomatic other than generalized weakness the incision looks good she has no significant abdominal pain.  At this point I will admit her to hospital for further evaluation and stabilization of the hemoglobin.        Diagnosis:    ICD-10-CM    1. Anemia due to blood loss, acute  D62       2. History of aortic aneurysm  Z86.79       3. Atrial fibrillation with RVR (H)  I48.91            Discharge Medications:  New Prescriptions    No medications on file          Rebeca Ozuna MD  12/18/2023   Rebeca Ozuna MD Bochert, Michelle Ann, MD  12/18/23 2030

## 2023-12-19 ENCOUNTER — APPOINTMENT (OUTPATIENT)
Dept: CARDIOLOGY | Facility: CLINIC | Age: 78
DRG: 356 | End: 2023-12-19
Attending: INTERNAL MEDICINE
Payer: MEDICARE

## 2023-12-19 LAB
ANION GAP SERPL CALCULATED.3IONS-SCNC: 8 MMOL/L (ref 7–15)
BLD PROD TYP BPU: NORMAL
BLOOD COMPONENT TYPE: NORMAL
BUN SERPL-MCNC: 15.4 MG/DL (ref 8–23)
CALCIUM SERPL-MCNC: 8.5 MG/DL (ref 8.8–10.2)
CHLORIDE SERPL-SCNC: 101 MMOL/L (ref 98–107)
CODING SYSTEM: NORMAL
CREAT SERPL-MCNC: 0.57 MG/DL (ref 0.51–0.95)
CROSSMATCH: NORMAL
DEPRECATED HCO3 PLAS-SCNC: 26 MMOL/L (ref 22–29)
EGFRCR SERPLBLD CKD-EPI 2021: >90 ML/MIN/1.73M2
ERYTHROCYTE [DISTWIDTH] IN BLOOD BY AUTOMATED COUNT: 15.7 % (ref 10–15)
FERRITIN SERPL-MCNC: 125 NG/ML (ref 11–328)
GLUCOSE BLDC GLUCOMTR-MCNC: 142 MG/DL (ref 70–99)
GLUCOSE BLDC GLUCOMTR-MCNC: 144 MG/DL (ref 70–99)
GLUCOSE BLDC GLUCOMTR-MCNC: 162 MG/DL (ref 70–99)
GLUCOSE BLDC GLUCOMTR-MCNC: 169 MG/DL (ref 70–99)
GLUCOSE BLDC GLUCOMTR-MCNC: 182 MG/DL (ref 70–99)
GLUCOSE SERPL-MCNC: 178 MG/DL (ref 70–99)
HAPTOGLOB SERPL-MCNC: 290 MG/DL (ref 30–200)
HCT VFR BLD AUTO: 25.6 % (ref 35–47)
HEMOCCULT STL QL: POSITIVE
HGB BLD-MCNC: 6.8 G/DL (ref 11.7–15.7)
HGB BLD-MCNC: 8.3 G/DL (ref 11.7–15.7)
ISSUE DATE AND TIME: NORMAL
LVEF ECHO: NORMAL
MCH RBC QN AUTO: 29.4 PG (ref 26.5–33)
MCHC RBC AUTO-ENTMCNC: 32.4 G/DL (ref 31.5–36.5)
MCV RBC AUTO: 91 FL (ref 78–100)
PLATELET # BLD AUTO: 251 10E3/UL (ref 150–450)
POTASSIUM SERPL-SCNC: 4 MMOL/L (ref 3.4–5.3)
RBC # BLD AUTO: 2.82 10E6/UL (ref 3.8–5.2)
SODIUM SERPL-SCNC: 135 MMOL/L (ref 135–145)
UNIT ABO/RH: NORMAL
UNIT NUMBER: NORMAL
UNIT STATUS: NORMAL
UNIT TYPE ISBT: 9500
UPPER GI ENDOSCOPY: NORMAL
VIT B12 SERPL-MCNC: 522 PG/ML (ref 232–1245)
WBC # BLD AUTO: 9.7 10E3/UL (ref 4–11)

## 2023-12-19 PROCEDURE — 93306 TTE W/DOPPLER COMPLETE: CPT | Mod: 26 | Performed by: INTERNAL MEDICINE

## 2023-12-19 PROCEDURE — 99222 1ST HOSP IP/OBS MODERATE 55: CPT | Mod: GC | Performed by: SURGERY

## 2023-12-19 PROCEDURE — 93010 ELECTROCARDIOGRAM REPORT: CPT | Performed by: INTERNAL MEDICINE

## 2023-12-19 PROCEDURE — 120N000001 HC R&B MED SURG/OB

## 2023-12-19 PROCEDURE — 999N000208 ECHOCARDIOGRAM COMPLETE

## 2023-12-19 PROCEDURE — 250N000013 HC RX MED GY IP 250 OP 250 PS 637: Performed by: STUDENT IN AN ORGANIZED HEALTH CARE EDUCATION/TRAINING PROGRAM

## 2023-12-19 PROCEDURE — 82272 OCCULT BLD FECES 1-3 TESTS: CPT | Performed by: STUDENT IN AN ORGANIZED HEALTH CARE EDUCATION/TRAINING PROGRAM

## 2023-12-19 PROCEDURE — 85027 COMPLETE CBC AUTOMATED: CPT | Performed by: STUDENT IN AN ORGANIZED HEALTH CARE EDUCATION/TRAINING PROGRAM

## 2023-12-19 PROCEDURE — 255N000002 HC RX 255 OP 636: Performed by: STUDENT IN AN ORGANIZED HEALTH CARE EDUCATION/TRAINING PROGRAM

## 2023-12-19 PROCEDURE — C9113 INJ PANTOPRAZOLE SODIUM, VIA: HCPCS | Performed by: STUDENT IN AN ORGANIZED HEALTH CARE EDUCATION/TRAINING PROGRAM

## 2023-12-19 PROCEDURE — P9016 RBC LEUKOCYTES REDUCED: HCPCS | Performed by: STUDENT IN AN ORGANIZED HEALTH CARE EDUCATION/TRAINING PROGRAM

## 2023-12-19 PROCEDURE — 85018 HEMOGLOBIN: CPT | Performed by: STUDENT IN AN ORGANIZED HEALTH CARE EDUCATION/TRAINING PROGRAM

## 2023-12-19 PROCEDURE — 80048 BASIC METABOLIC PNL TOTAL CA: CPT | Performed by: STUDENT IN AN ORGANIZED HEALTH CARE EDUCATION/TRAINING PROGRAM

## 2023-12-19 PROCEDURE — 999N000099 HC STATISTIC MODERATE SEDATION < 10 MIN: Performed by: INTERNAL MEDICINE

## 2023-12-19 PROCEDURE — 36415 COLL VENOUS BLD VENIPUNCTURE: CPT | Performed by: STUDENT IN AN ORGANIZED HEALTH CARE EDUCATION/TRAINING PROGRAM

## 2023-12-19 PROCEDURE — 250N000011 HC RX IP 250 OP 636: Performed by: STUDENT IN AN ORGANIZED HEALTH CARE EDUCATION/TRAINING PROGRAM

## 2023-12-19 PROCEDURE — 93005 ELECTROCARDIOGRAM TRACING: CPT

## 2023-12-19 PROCEDURE — 0DB98ZX EXCISION OF DUODENUM, VIA NATURAL OR ARTIFICIAL OPENING ENDOSCOPIC, DIAGNOSTIC: ICD-10-PCS | Performed by: INTERNAL MEDICINE

## 2023-12-19 PROCEDURE — 43239 EGD BIOPSY SINGLE/MULTIPLE: CPT | Performed by: INTERNAL MEDICINE

## 2023-12-19 PROCEDURE — 99233 SBSQ HOSP IP/OBS HIGH 50: CPT | Performed by: INTERNAL MEDICINE

## 2023-12-19 PROCEDURE — 250N000011 HC RX IP 250 OP 636: Performed by: INTERNAL MEDICINE

## 2023-12-19 PROCEDURE — 88305 TISSUE EXAM BY PATHOLOGIST: CPT | Mod: TC | Performed by: INTERNAL MEDICINE

## 2023-12-19 RX ORDER — ACETAMINOPHEN 325 MG/1
650 TABLET ORAL EVERY 4 HOURS PRN
Status: DISCONTINUED | OUTPATIENT
Start: 2023-12-19 | End: 2023-12-21

## 2023-12-19 RX ORDER — NALOXONE HYDROCHLORIDE 0.4 MG/ML
0.4 INJECTION, SOLUTION INTRAMUSCULAR; INTRAVENOUS; SUBCUTANEOUS
Status: DISCONTINUED | OUTPATIENT
Start: 2023-12-19 | End: 2023-12-22 | Stop reason: HOSPADM

## 2023-12-19 RX ORDER — AMOXICILLIN 250 MG
1 CAPSULE ORAL 2 TIMES DAILY PRN
Status: DISCONTINUED | OUTPATIENT
Start: 2023-12-19 | End: 2023-12-21

## 2023-12-19 RX ORDER — FLUMAZENIL 0.1 MG/ML
0.2 INJECTION, SOLUTION INTRAVENOUS
Status: ACTIVE | OUTPATIENT
Start: 2023-12-19 | End: 2023-12-20

## 2023-12-19 RX ORDER — LIDOCAINE 40 MG/G
CREAM TOPICAL
Status: DISCONTINUED | OUTPATIENT
Start: 2023-12-19 | End: 2023-12-21

## 2023-12-19 RX ORDER — AMOXICILLIN 250 MG
2 CAPSULE ORAL 2 TIMES DAILY PRN
Status: DISCONTINUED | OUTPATIENT
Start: 2023-12-19 | End: 2023-12-21

## 2023-12-19 RX ORDER — LIDOCAINE 40 MG/G
CREAM TOPICAL
Status: DISCONTINUED | OUTPATIENT
Start: 2023-12-19 | End: 2023-12-19 | Stop reason: HOSPADM

## 2023-12-19 RX ORDER — DEXTROSE MONOHYDRATE 25 G/50ML
25-50 INJECTION, SOLUTION INTRAVENOUS
Status: DISCONTINUED | OUTPATIENT
Start: 2023-12-19 | End: 2023-12-22 | Stop reason: HOSPADM

## 2023-12-19 RX ORDER — LISINOPRIL 20 MG/1
20 TABLET ORAL EVERY EVENING
Status: DISCONTINUED | OUTPATIENT
Start: 2023-12-19 | End: 2023-12-22 | Stop reason: HOSPADM

## 2023-12-19 RX ORDER — AMLODIPINE BESYLATE 5 MG/1
10 TABLET ORAL DAILY
Status: DISCONTINUED | OUTPATIENT
Start: 2023-12-19 | End: 2023-12-22 | Stop reason: HOSPADM

## 2023-12-19 RX ORDER — FENTANYL CITRATE 50 UG/ML
INJECTION, SOLUTION INTRAMUSCULAR; INTRAVENOUS PRN
Status: DISCONTINUED | OUTPATIENT
Start: 2023-12-19 | End: 2023-12-19 | Stop reason: HOSPADM

## 2023-12-19 RX ORDER — NALOXONE HYDROCHLORIDE 0.4 MG/ML
0.2 INJECTION, SOLUTION INTRAMUSCULAR; INTRAVENOUS; SUBCUTANEOUS
Status: DISCONTINUED | OUTPATIENT
Start: 2023-12-19 | End: 2023-12-22 | Stop reason: HOSPADM

## 2023-12-19 RX ORDER — CALCIUM CARBONATE 500 MG/1
1000 TABLET, CHEWABLE ORAL 4 TIMES DAILY PRN
Status: DISCONTINUED | OUTPATIENT
Start: 2023-12-19 | End: 2023-12-22 | Stop reason: HOSPADM

## 2023-12-19 RX ORDER — ACETAMINOPHEN 650 MG/1
650 SUPPOSITORY RECTAL EVERY 4 HOURS PRN
Status: DISCONTINUED | OUTPATIENT
Start: 2023-12-19 | End: 2023-12-21

## 2023-12-19 RX ORDER — METFORMIN HCL 500 MG
1000 TABLET, EXTENDED RELEASE 24 HR ORAL 2 TIMES DAILY WITH MEALS
Status: DISCONTINUED | OUTPATIENT
Start: 2023-12-19 | End: 2023-12-22 | Stop reason: HOSPADM

## 2023-12-19 RX ORDER — SODIUM CHLORIDE 9 MG/ML
INJECTION, SOLUTION INTRAVENOUS CONTINUOUS
Status: DISCONTINUED | OUTPATIENT
Start: 2023-12-20 | End: 2023-12-21

## 2023-12-19 RX ORDER — METOPROLOL TARTRATE 50 MG
50 TABLET ORAL DAILY
Status: DISCONTINUED | OUTPATIENT
Start: 2023-12-19 | End: 2023-12-22 | Stop reason: HOSPADM

## 2023-12-19 RX ORDER — NICOTINE POLACRILEX 4 MG
15-30 LOZENGE BUCCAL
Status: DISCONTINUED | OUTPATIENT
Start: 2023-12-19 | End: 2023-12-22 | Stop reason: HOSPADM

## 2023-12-19 RX ADMIN — LISINOPRIL 20 MG: 20 TABLET ORAL at 19:47

## 2023-12-19 RX ADMIN — PANTOPRAZOLE SODIUM 40 MG: 40 INJECTION, POWDER, FOR SOLUTION INTRAVENOUS at 19:47

## 2023-12-19 RX ADMIN — INSULIN ASPART 1 UNITS: 100 INJECTION, SOLUTION INTRAVENOUS; SUBCUTANEOUS at 06:42

## 2023-12-19 RX ADMIN — INSULIN ASPART 1 UNITS: 100 INJECTION, SOLUTION INTRAVENOUS; SUBCUTANEOUS at 10:37

## 2023-12-19 RX ADMIN — AMLODIPINE BESYLATE 10 MG: 5 TABLET ORAL at 08:38

## 2023-12-19 RX ADMIN — PANTOPRAZOLE SODIUM 40 MG: 40 INJECTION, POWDER, FOR SOLUTION INTRAVENOUS at 08:38

## 2023-12-19 RX ADMIN — METFORMIN ER 500 MG 1000 MG: 500 TABLET ORAL at 08:38

## 2023-12-19 RX ADMIN — INSULIN ASPART 1 UNITS: 100 INJECTION, SOLUTION INTRAVENOUS; SUBCUTANEOUS at 18:41

## 2023-12-19 RX ADMIN — METOPROLOL TARTRATE 50 MG: 50 TABLET, FILM COATED ORAL at 08:38

## 2023-12-19 RX ADMIN — HUMAN ALBUMIN MICROSPHERES AND PERFLUTREN 9 ML: 10; .22 INJECTION, SOLUTION INTRAVENOUS at 16:21

## 2023-12-19 RX ADMIN — INSULIN ASPART 1 UNITS: 100 INJECTION, SOLUTION INTRAVENOUS; SUBCUTANEOUS at 14:01

## 2023-12-19 RX ADMIN — INSULIN ASPART 1 UNITS: 100 INJECTION, SOLUTION INTRAVENOUS; SUBCUTANEOUS at 02:26

## 2023-12-19 RX ADMIN — INSULIN ASPART 1 UNITS: 100 INJECTION, SOLUTION INTRAVENOUS; SUBCUTANEOUS at 21:56

## 2023-12-19 RX ADMIN — METFORMIN ER 500 MG 1000 MG: 500 TABLET ORAL at 18:41

## 2023-12-19 RX ADMIN — SERTRALINE HYDROCHLORIDE 50 MG: 50 TABLET ORAL at 19:47

## 2023-12-19 ASSESSMENT — ACTIVITIES OF DAILY LIVING (ADL)
ADLS_ACUITY_SCORE: 22
ADLS_ACUITY_SCORE: 35
ADLS_ACUITY_SCORE: 22

## 2023-12-19 NOTE — H&P
[facility]    History and Physical - Hospitalist Service       Date of Admission:  12/18/2023    Assessment & Plan      Tammy Osorio is a 78 year old female with past medical history significant for HTN, diabetes and left adnexal mass s/p ex-lap, bilateral salpingo-oophorectomy and removal of mass on 11/27 of admitted on 12/18/2023 presents to the ED from her primary care clinic for evaluation of acute severe anemia.     Acute Normocytic Anemia  Pt seen in follow-up by her primary doctor today with complaints of light-headedness and weakness. She was found to have a hemoglobin of 5.0 so was sent to the ED. Notably, she did have ex-lap with BSO and adenexal mass removal on 11/27 (see below). This was complicated by incisional hematoma, but at the time of discharge (12/1) her hemoglobin was stable at 12.1.  *Due to her recent surgery, a CTA of the abdomen and pelvis was obtained in the ED which did not show any large hematoma or evidence of active bleeding with in the abdomen of pelvis.    *Pt denies any hematochezia, melena, hematemesis,  hematuria, or abnormal uterine bleeding.   * Pt transfused 2 units of PRBCs   - Anemia eval - hemoccult, Reticulocyte count, ferritin, iron studies, B12, hepatic panel, LDH, haptoglobin, TSH.   - Hold PTA ASA   - Gyn Onc consult  - Serial hemoglobin   - Transfuse for hgb <7  - Given normal CTA and no evidence of post surgical bleeding, will consult GI to see if EGD is indicated.   - IV PPI   - NPO at midnight     Left adnexal mass s/p ex lap, bilateral salpingo-oophorectomy, and pelvic washings with removal of mass.   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 Pt had issues with hyponatremia during her last admission as well which was suspected to be 2/2 SIADH. Sodium at discharge was 130.    - 1800ml fluid restriction  - Monitor sodium     Atrial fibrillation, new onset   EKG in the ED notable for atrial fibrillation. Pt has no prior history of this. Suspect this was triggered by severe anemia.   - Cardiac monitoring   - Hold anticoagulation in the setting of anemia   - Continue PTA metoprolol   - PRN metoprolol for HR>120   - Consider holter monitor at discharge     Mild troponin elevation   Troponin elevated to 46 on admission. Suspect demand ischemia in the setting of severe anemia. Per reports, had nuclear stress study 11/1/23 that was negative for ischemia.  - Monitor  - Cardiac work-up as noted above     Multiple pulmonary nodules   COPD.  Tobacco dependence  Pulmonary nodules noted on CT - mild scattered non-calcified/indeterminate pulmonary nodules with the largest seen measuring 4.9mm in the LLL.   - Recommend follow-up per PCP, high risk given smoking history   - Pt declined nicotine replacement   - Strongly recommend tobacco cessation.      Nearly occlusive left gonadal vein thrombus.   Noted on CTA. Significance is unclear to me   - Gynecology consulted.      DM type II.  PTA: metformin XR 1000 mg BID with meals.  Blood sugars in the 300s on admission.   * No recent A1C (was 7.1 in 2020).  - Check hemoglobin A1C   - MSSI   - Hold PTA metformin for now     Hypertension (benign essential).  PTA: amlodipine 10 mg daily; lisinopril 20 mg daily; metoprolol 50 mg BID.  - Continue PTA amlodipine, lisinopril, and metoprolol.     Ascending aortic aneurysm.  * Per H&P: 5.5 cm on 1/2014 CT scan -> stable.  - Continue metoprolol.  - Strongly recommend tobacco cessation.  - Follow-up with Dr. Maya (CV Surgeon, [facility]) as per routine.    Diet: Regular diet   DVT Prophylaxis: Pneumatic Compression Devices  Andres Catheter: Not present  Lines: None     Cardiac Monitoring: None  Code Status: Full Code     Clinically Significant Risk Factors Present on Admission         # Hyponatremia: Lowest Na = 129  "mmol/L in last 2 days, will monitor as appropriate        # Drug Induced Platelet Defect: home medication list includes an antiplatelet medication   # Hypertension: Noted on problem list      # Cachexia: Estimated body mass index is 17.75 kg/m  as calculated from the following:    Height as of this encounter: 1.676 m (5' 6\").    Weight as of this encounter: 49.9 kg (110 lb).       # Financial/Environmental Concerns:           Disposition Plan      Expected Discharge Date: 12/20/2023                  Meredith Lowe MD  Hospitalist Service  [facility]  Securely message with [employer] (more info)  Text page via [facility] Paging/Directory     ______________________________________________________________________    Chief Complaint   Low hemoglobin     History is obtained from the patient    History of Present Illness   Tammy Osorio is a 78 year old female who presents to the ED at the request of her PCP for evaluation of low hemoglobin. Notably, she did have ex-lap with BSO and adenexal mass removal on 11/27 (see below). This was complicated by incisional hematoma, but at the time of discharge (12/1) her hemoglobin was stable at 12.1. Since her surgery she reports she has actually been doing really well until last Thursday she started feeling a little light-headed. She did have an episode where she kind of slid down to the ground and hit her rib cage, but did not hit her head or have any LOC. Since then she has been intermittently a little light-headed at times. SHe was seen by her PCP today and was found to have a hemoglobin of 5. Pt denies any hematochezia, melena, hematemesis,  hematuria, or abnormal uterine bleeding.     Past Medical History    Past Medical History:   Diagnosis Date    Anxiety     Aortic regurgitation     Ascending aortic aneurysm (H24)     Diabetes (H)     Hypertension     Osteopenia     Pelvic mass     Pulmonary emphysema (H)     Sciatica, unspecified laterality  "       Past Surgical History   Past Surgical History:   Procedure Laterality Date    HYSTERECTOMY TOTAL ABDOMINAL, BILATERAL SALPINGO-OOPHORECTOMY, COMBINED Bilateral 11/27/2023    Procedure: EXPLORATORY LAPAROTOMY , BILATERAL SALPINGO OOPHORECTOMY , PELVIC WASHING;  Surgeon: Naye Scruggs MD;  Location:  OR       Prior to Admission Medications   Prior to Admission Medications   Prescriptions Last Dose Informant Patient Reported? Taking?   amLODIPine (NORVASC) 10 MG tablet Past Week Self Yes Yes   Sig: Take 10 mg by mouth daily   aspirin 81 MG EC tablet 12/17/2023 at AM Self No Yes   Sig: Take 1 tablet (81 mg) by mouth daily   famotidine (PEPCID) 10 MG tablet  at prn Self No Yes   Sig: Take 1 tablet (10 mg) by mouth 2 times daily as needed (heartburn)   lisinopril (ZESTRIL) 20 MG tablet 12/17/2023 at PM Self Yes Yes   Sig: Take 20 mg by mouth every evening   metFORMIN (GLUCOPHAGE XR) 500 MG 24 hr tablet Past Month Self No Yes   Sig: Take 2 tablets (1,000 mg) by mouth 2 times daily (with meals) ; hold until eating better.   metoprolol tartrate (LOPRESSOR) 50 MG tablet 12/17/2023 Self Yes Yes   Sig: Take 50 mg by mouth daily   ondansetron (ZOFRAN ODT) 4 MG ODT tab  at prn Self No Yes   Sig: Take 1 tablet (4 mg) by mouth every 6 hours as needed for nausea or vomiting   sertraline (ZOLOFT) 50 MG tablet 12/17/2023 at PM Self Yes Yes   Sig: Take 50 mg by mouth every evening   traMADol (ULTRAM) 50 MG tablet  at prn Self No Yes   Sig: Take 0.5-1 tablets (25-50 mg) by mouth every 6 hours as needed for severe pain      Facility-Administered Medications: None        Review of Systems    The 10 point Review of Systems is negative other than noted in the HPI or here.     Physical Exam   Vital Signs: Temp: 97.9  F (36.6  C) Temp src: Oral BP: (!) 152/59 Pulse: 98   Resp: 20 SpO2: 97 % O2 Device: None (Room air)    Weight: 110 lbs 0 oz    Constitutional: Awake, alert, cooperative, no apparent distress.  Eyes: Conjunctiva  and pupils examined and normal.  HEENT: Moist mucous membranes, normal dentition.  Respiratory: Clear to auscultation bilaterally, no crackles or wheezing.  Cardiovascular: irregularly irregular, normal S1 and S2, and no murmur noted.  GI: Soft, non-distended, non-tender, normal bowel sounds. Well healing midline abdominal incision, minimal bruiing around the incision   Skin: No rashes, no cyanosis, no edema.  Musculoskeletal: No joint swelling, erythema or tenderness.  Neurologic: Cranial nerves 2-12 intact, normal strength and sensation.  Psychiatric: Alert, oriented to person, place and time, no obvious anxiety or depression.      Medical Decision Making       75 MINUTES SPENT BY ME on the date of service doing chart review, history, exam, documentation & further activities per the note.      Data     I have personally reviewed the following data over the past 24 hrs:    11.8 (H)  \   4.9 (LL)   / 356     129 (L) 92 (L) 20.6 /  306 (H)   4.5 27 0.70 \     ALT: 11 AST: 21 AP: 90 TBILI: 0.5   ALB: 3.4 (L) TOT PROTEIN: 6.0 (L) LIPASE: N/A     Trop: 46 (H) BNP: N/A     TSH: 3.53 T4: N/A A1C: N/A     Ferritin:  N/A % Retic:  13.6 (H) LDH:  N/A       Imaging results reviewed over the past 24 hrs:   Recent Results (from the past 24 hour(s))   Chest CT w/o contrast    Narrative    EXAM: CT CHEST W/O CONTRAST  LOCATION: [facility]  DATE: 12/18/2023    INDICATION: Eval for rib.  COMPARISON: None.  TECHNIQUE: CT chest without IV contrast. Multiplanar reformats were obtained. Dose reduction techniques were used.  CONTRAST: None.    FINDINGS:   LUNGS AND PLEURA: There are minimal changes of centrilobular emphysema seen in the upper lobes bilaterally. Minimal scattered blebs in both lungs. Slight bilateral apical pleural scarring. Minimal scattered noncalcified pulmonary nodules in both lungs   with the largest seen in the left lower lobe measuring 4.9 mm in greatest dimension. Mild left pleural  effusion which appears dependent in nature.    MEDIASTINUM/AXILLAE: Heavily calcified thoracic aorta with aneurysmal dilatation of both the ascending and descending thoracic aorta. The ascending thoracic aorta measures approximately 6.2 cm in greatest radial dimension and the descending thoracic   aorta 4.2 cm. There is no evidence for a rupture of the thoracic aorta. The left thyroid lobe is not seen and presumed atrophic or surgically absent.    CORONARY ARTERY CALCIFICATION: Moderate.    UPPER ABDOMEN: No significant finding.    MUSCULOSKELETAL: Moderate scattered hypertrophic changes in the spine.      Impression    IMPRESSION:   1.  No inflammatory infiltrates.    2.  Mild scattered noncalcified/indeterminate pulmonary nodules with the largest seen measuring 4.9 mm in the left lower lobe. Please refer to below reference for possible follow-up.    3. Mild left pleural effusion which appears dependent in nature.    4. Heavily calcified thoracic aortic aneurysm with the ascending thoracic aorta measuring approximately 6.2 cm in greatest radial dimension and the descending thoracic aorta 4.2 cm.    REFERENCE:  Guidelines for Management of Incidental Pulmonary Nodules Detected on CT Images: From the Fleischner Society 2017.   Guidelines apply to incidental nodules in patients who are 35 years or older.  Guidelines do not apply to lung cancer screening, patients with immunosuppression, or patients with known primary cancer.    MULTIPLE NODULES  Nodule size <6 mm  Low-risk patients: No follow-up needed.  High-risk patients: Optional follow-up at 12 months.    Nodule size 6 mm or larger  Low-risk patients: Follow-up CT at 3-6 months, then consider CT at 18-24 months.  High-risk patients: Follow-up CT at 3-6 months, then at 18-24 months if no change.  -Use most suspicious nodule as guide to management.   CTA Abdomen Pelvis with Contrast    Narrative    EXAM: CTA ABDOMEN PELVIS WITH CONTRAST  LOCATION: [facility]  DATE: 12/18/2023    INDICATION:Status post abdominal surgery with post op hematoma. Hgb from 11 to 5. Also fell and injured left ribs, eval for rib fx and PE  COMPARISON: CT chest 12/18/2023.  TECHNIQUE: CT angiogram abdomen pelvis during arterial phase of injection of IV contrast. 2D and 3D MIP reconstructions were performed by the CT technologist. Dose reduction techniques were used.  CONTRAST: 72mL Isovue 370    FINDINGS:  ANGIOGRAM ABDOMEN/PELVIS: Infrarenal abdominal aortic aneurysm (4.6 cm) with anterior mural thrombus. No significant surrounding inflammation. Severe atherosclerotic disease with episodic mild to moderate narrowing. Severe right internal iliac artery   narrowing. Severe celiac artery origin narrowing. Superior mesenteric and bilateral renal arteries are patent. Inferior mesenteric artery not well visualized. Descending thoracic aortic aneurysm (4 cm).    LOWER CHEST: Small left pleural effusion with adjacent atelectasis.    HEPATOBILIARY: Right hepatic lobe cyst. No suspicious liver lesion. Gallbladder is unremarkable.    PANCREAS: Normal.    SPLEEN: Normal.    ADRENAL GLANDS: Normal.    KIDNEYS/BLADDER: Normal.    BOWEL: Normal.    LYMPH NODES/PERITONEUM: No enlarged lymph node. Trace abdominopelvic ascites. No large hematoma or evidence of active bleeding.    PELVIC ORGANS: Uterus is present. No adnexal mass. 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    MUSCULOSKELETAL: Ventral abdominal surgical changes. Tiny lower anterior abdominal wall fluid collection (1.4 cm). No evidence of active bleeding.      Impression    IMPRESSION:  1.  Trace abdominopelvic ascites. No large hematoma or evidence of active bleeding within the abdomen or pelvis.  2.  Nearly occlusive left gonadal vein thrombus.  3.  Tiny inferoanterior abdominal wall fluid collection (1.4 cm), likely seroma.  4.  Infrarenal abdominal aortic aneurysm (4.6 cm) with anterior mural thrombus.  5.  Severe  celiac artery origin narrowing.  6.  Small left pleural effusion.    Findings were discussed with Dr. Ozuna on 12/18/2023 at 7:23 PM

## 2023-12-19 NOTE — PROGRESS NOTES
[facility]    Patient is currently receiving home care services from [facility]. The patient is currently receiving SN services.  and home health team have been notified of patient admission. [facility] Liaison will continue to follow patient during stay. If appropriate provide orders to resume home care at time of discharge.    REINIER Polo, LPN  Home Care Liaison   Cell: [phone]

## 2023-12-19 NOTE — PHARMACY-ADMISSION MEDICATION HISTORY
Pharmacist Admission Medication History    Admission medication history is complete. The information provided in this note is only as accurate as the sources available at the time of the update.    Information Source(s): Patient and CareEverywhere/SureScripts via in-person    Pertinent Information: Patient has not been taking metformin per MD instructions while not eating as much. She also recently started holding Norvasc and decreased Lopressor to daily dosing due to hypotension.    Changes made to PTA medication list:  Added: None  Deleted: None  Changed: Lopressor 50mg BID --> daily    Medication Affordability:  Not including over the counter (OTC) medications, was there a time in the past 3 months when you did not take your medications as prescribed because of cost?: No    Allergies reviewed with patient and updates made in EHR: yes    Medication History Completed By: Ani Hayes RP 12/18/2023 8:17 PM    PTA Med List   Medication Sig Note Last Dose    amLODIPine (NORVASC) 10 MG tablet Take 10 mg by mouth daily 12/18/2023: On hold d/t hypotension Past Week    aspirin 81 MG EC tablet Take 1 tablet (81 mg) by mouth daily  12/17/2023 at AM    famotidine (PEPCID) 10 MG tablet Take 1 tablet (10 mg) by mouth 2 times daily as needed (heartburn)   at prn    lisinopril (ZESTRIL) 20 MG tablet Take 20 mg by mouth every evening  12/17/2023 at PM    metFORMIN (GLUCOPHAGE XR) 500 MG 24 hr tablet Take 2 tablets (1,000 mg) by mouth 2 times daily (with meals) ; hold until eating better. 12/18/2023: On hold since 11/26/23 d/t low food intake Past Month    metoprolol tartrate (LOPRESSOR) 50 MG tablet Take 50 mg by mouth daily  12/17/2023    ondansetron (ZOFRAN ODT) 4 MG ODT tab Take 1 tablet (4 mg) by mouth every 6 hours as needed for nausea or vomiting   at prn    sertraline (ZOLOFT) 50 MG tablet Take 50 mg by mouth every evening  12/17/2023 at PM    traMADol (ULTRAM) 50 MG tablet Take 0.5-1 tablets (25-50 mg) by mouth every  6 hours as needed for severe pain   at prn

## 2023-12-19 NOTE — PLAN OF CARE
Date: 12/19/23 [id number]  Surgery/POD#: Acute Anemia  Behavior & Aggression: Green  Fall Risk: Yes  Orientation: A&Ox4  ABNL VS/O2: VSS on RA ex htn  ABNL Labs: Hgb 8.3 (was given 1unit PRBC over last shift) recheck in am  Pain Management: Denies pain  Bowel/Bladder: Continent B&B. X1 BM small amount of black tarry stool  Drains:X1PIV SL  Diet: Reg diet w/1800ml FR. Plan for NPO tonight   Activity Level: SBA gb/w  Tests/Procedures: Seen by GI and Gyn Onc. Endoscopy and echo completed today  Anticipated  DC Date: Pending. Vascular consulted, plan for potential SMA stent placement tomorrow. Will be NPO at midnight. CMS intact. Gyn Onc removed staples on midline incision today.

## 2023-12-19 NOTE — CONSULTS
[facility]  Gastroenterology Consultation         Tammy Osorio  [address] [facility] [address]  78 year old female    Admission Date/Time: 12/18/2023  Primary Care Provider: Heriberto Jackson  Referring / Attending Physician:  Dr. Meredith Tate    We were asked to see the patient in consultation by Dr. Meredith Lowe for evaluation of anemia.      CC: weakness and lightheaded with low hemoglobin    HPI:  Tammy Osorio is a 78 year old female who has a PMHx of diabetes AAA, among others whom presented to PCP on 12/18/23 due to lightheadedness and found to have a low hemoglobin. Of note, she did undergo a exploratory laparotomy with bilateral salpingo-oophorectomy and adnexal mass excised on 11/27/23 with pathology revealing left mucinous cystadenoma associated with benign Javier tumor, this was complicated by an incisional hematoma but hemoglobin was 12.1 at time of discharge.    Patient denies any source of GI blood loss, no melena, hematochezia or hematemesis. She has had no blood in urine. Did note one episode of diarrhea with no blood in in 3-4 days ago. At time of presentation she reports had been doing well in immediate post op period and started to feel slightly light headed 5 days ago and did report sliding to round and hitting rib cage.With periods of intermittent lightheadedness since. Denies use of NSAIDs or thinners. Smokes 1 ppd.    ROS: A comprehensive ten point review of systems was negative aside from those in mentioned in the HPI.      PAST MED HX:  I have reviewed this patient's medical history and updated it with pertinent information if needed.   Past Medical History:   Diagnosis Date    Anxiety     Aortic regurgitation     Ascending aortic aneurysm (H24)     Diabetes (H)     Hypertension     Osteopenia     Pelvic mass     Pulmonary emphysema (H)     Sciatica, unspecified laterality        MEDICATIONS:   Prior to Admission Medications   Prescriptions  Last Dose Informant Patient Reported? Taking?   amLODIPine (NORVASC) 10 MG tablet Past Week Self Yes Yes   Sig: Take 10 mg by mouth daily   aspirin 81 MG EC tablet 12/17/2023 at AM Self No Yes   Sig: Take 1 tablet (81 mg) by mouth daily   famotidine (PEPCID) 10 MG tablet  at prn Self No Yes   Sig: Take 1 tablet (10 mg) by mouth 2 times daily as needed (heartburn)   lisinopril (ZESTRIL) 20 MG tablet 12/17/2023 at PM Self Yes Yes   Sig: Take 20 mg by mouth every evening   metFORMIN (GLUCOPHAGE XR) 500 MG 24 hr tablet Past Month Self No Yes   Sig: Take 2 tablets (1,000 mg) by mouth 2 times daily (with meals) ; hold until eating better.   metoprolol tartrate (LOPRESSOR) 50 MG tablet 12/17/2023 Self Yes Yes   Sig: Take 50 mg by mouth daily   ondansetron (ZOFRAN ODT) 4 MG ODT tab  at prn Self No Yes   Sig: Take 1 tablet (4 mg) by mouth every 6 hours as needed for nausea or vomiting   sertraline (ZOLOFT) 50 MG tablet 12/17/2023 at PM Self Yes Yes   Sig: Take 50 mg by mouth every evening   traMADol (ULTRAM) 50 MG tablet  at prn Self No Yes   Sig: Take 0.5-1 tablets (25-50 mg) by mouth every 6 hours as needed for severe pain      Facility-Administered Medications: None       ALLERGIES:   Allergies   Allergen Reactions    Sulfa Antibiotics Swelling     Swelling of lips       SOCIAL HISTORY:  Social History     Tobacco Use    Smoking status: Every Day     Types: Cigarettes    Smokeless tobacco: Never   Vaping Use    Vaping Use: Never used   Substance Use Topics    Alcohol use: Yes     Comment: occassional    Drug use: Never       FAMILY HISTORY:  No family history on file.    PHYSICAL EXAM:   General  alert, oriented and comfortable  Vital Signs with Ranges  Temp: 98.6  F (37  C) Temp src: Oral BP: (!) 148/63 Pulse: 89   Resp: 19 SpO2: 96 % O2 Device: None (Room air)    I/O last 3 completed shifts:  In: 600   Out: -     Constitutional: healthy, alert, and no distress   Cardiovascular: negative, PMI normal. No lifts, heaves, or  thrills. RRR. No murmurs, clicks gallops or rub  Respiratory: negative, Percussion normal. Good diaphragmatic excursion. Lungs clear  Abdomen: Abdomen soft, post op tenderness. BS normal. No masses, organomegaly          ADDITIONAL COMMENTS:   I reviewed the patient's new clinical lab test results.   Recent Labs   Lab Test 12/19/23  0316 12/18/23  1559 12/01/23  0702 11/30/23  0842 11/29/23  1009 11/28/23  0817   WBC  --  11.8*  --   --   --  9.1   HGB 6.8* 4.9* 11.7  --    < > 12.5   MCV  --  96  --   --   --  93   PLT  --  356  --  322  --  253    < > = values in this interval not displayed.     Recent Labs   Lab Test 12/18/23  1559 11/30/23  0842 11/29/23  1009   POTASSIUM 4.5 4.1 4.8   CHLORIDE 92* 94* 99   CO2 27 23 22   BUN 20.6 21.9 14.6   ANIONGAP 10 9 9     Recent Labs   Lab Test 12/18/23  1559   ALBUMIN 3.4*   BILITOTAL 0.5   ALT 11   AST 21       I reviewed the patient's new imaging results.        CONSULTATION ASSESSMENT AND PLAN:    Tammy España is a 78 with PMHx of diabetes AAA, among others whom presented to PCP on 12/18/23 due to lightheadedness and found to have a low hemoglobin. Of note, she did undergo a exploratory laparotomy with bilateral salpingo-oophorectomy and adnexal mass excised on 11/27/23 with pathology revealing left mucinous cystadenoma associated with benign Javier tumor, this was complicated by an incisional hematoma but hemoglobin was 12.1 at time of discharge.     Anemia due to blood loss, acute  Lightheadeness/weakness  Hemoglobin 4.9->6.8, baseline 11-12  CTA A/P notes trace ascites, no large hematoma or evidence of active bleeding in abdomen or pelvis. Nearly occlusive left gonadal vein thrombus. Infrarenal AAA with anterior mural thrombus. Severe celiac artery narrowing  Recent exploratory laparotomy (11/27/23), BSO with hematoma post op  No prior EGD or colonoscopy   Risk factors for GI bleed include tobacco abuse and prior colorectal screening    - EGD today  - NPO  - IV  pantoprazole 40 mg BID  - Serial hemoglobin and transfuse for hemoglobin of 7 or less  - If negative would need to consider colonoscopy but with recent surgery would want clearance from gyn/onc        [facility].  Office: [phone]  Cell : [phone] (Dr. Ortega)  Cell: [phone] (Luz Soto PA-C)

## 2023-12-19 NOTE — PROGRESS NOTES
RECEIVING UNIT ED HANDOFF REVIEW    ED Nurse Handoff Report was reviewed by: Yesy Key RN on December 19, 2023 at 12:12 AM

## 2023-12-19 NOTE — CONSULTS
GYN/ONC CONSULT  Patient Name: Tammy Osorio  Address: [address] [place] [id number]  Age:78 year old, : 1945   Sex: female   Admission Date/Time: 2023  3:54 PM   CC: I was asked to see Ms. Tammy Osorio by Dr. Meredith Lowe in consultation for postoperative anemia     HPI:   The patient is 78 year old female   Patient has noted abdominal swelling x 4 years.  PCP, Dr. Heriberto Jackson, ordered CT A/P.  23 CT A/P:  Large complex cystic process within the abdomen measuring > 26 cm.  Small left pleural effusion.T A/P:  Large complex cystic process within the abdomen measuring > 26 cm.  Small left pleural effusion.   = 132 U/mL  23 Consultation with Dr. Kia Roman.  Referral to Gyn Oncology.  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 Negative washings  Incisional hematoma noted during hospital stay. Superior aspect of incision opened and packed. Hgb remained stable at 12. Discharged POD #5    Interval history: Tammy was seen in clinic for her post operative appointment last  and was recovering very nicely. Eating and drinking well, having regular bowel and bladder function. Incision healing well.  She reports that she went to her PCP Thursday and episode of lightheadedness and fall while in the bathroom as well as diarrhea.  No melena or hematochezia.  She reports that overall she still felt fairly well but then became progressively lightheaded which prompted evaluation yesterday by PCP. There hgb was noted to be 5.0 and she was directed to ED for further evaluation.  CT C/A/P scan showed no signs of hematoma or active bleeding in  abdomen/pelvis, infrarenal abdominal aortic aneurysm, thoracic aortic aneurysm, nearly occlusive gonadal vein thrombus. 2 units of pRBC were given. Hgb 4.9-->6.8 this morning and another unit was given.  She denies any vaginal bleeding or abdominal pain. No prior colonoscopy.     REVIEW OF SYSTEMS  GENERAL: no fevers or chills, denies malaise, recent weight loss  HEENT: no URI symptoms, no changes with vision, no difficulty swallowing   CARDIOVASCULAR: +Lightheadedness denies chest pain, palpitations, dyspnea on exertion  PULMONARY: no SOB, no cough, no history of asthma  GI: + Diarrhea, denies melena, BRBPR, hematemesis, GERD, constipation  : no dysuria, hematuria   GYN: no vaginal bleeding or discharge  SKIN: no recent rashes or discoloration, no jaundice  ENDOCRINE: no history of diabetes or thyroid problems  NEURO: no history of seizures, no headaches, no neurologic disorders    PAST MEDICAL HISTORY    Past Medical History:   Diagnosis Date    Anxiety     Aortic regurgitation     Ascending aortic aneurysm (H24)     Diabetes (H)     Hypertension     Osteopenia     Pelvic mass     Pulmonary emphysema (H)     Sciatica, unspecified laterality       PAST SURGICAL HISTORY    Past Surgical History:   Procedure Laterality Date    HYSTERECTOMY TOTAL ABDOMINAL, BILATERAL SALPINGO-OOPHORECTOMY, COMBINED Bilateral 11/27/2023    Procedure: EXPLORATORY LAPAROTOMY , BILATERAL SALPINGO OOPHORECTOMY , PELVIC WASHING;  Surgeon: Naye Scruggs MD;  Location:  OR       CURRENT MEDS    No current facility-administered medications for this encounter.     Current Outpatient Medications   Medication    amLODIPine (NORVASC) 10 MG tablet    aspirin 81 MG EC tablet    famotidine (PEPCID) 10 MG tablet    lisinopril (ZESTRIL) 20 MG tablet    metFORMIN (GLUCOPHAGE XR) 500 MG 24 hr tablet    metoprolol tartrate (LOPRESSOR) 50 MG tablet    ondansetron (ZOFRAN ODT) 4 MG ODT tab    sertraline (ZOLOFT) 50 MG tablet    traMADol (ULTRAM)  "50 MG tablet     ALLERGIES/SENSITIVITIES    Allergies   Allergen Reactions    Sulfa Antibiotics Swelling     Swelling of lips     FAMILY HISTORY  No family history on file.  SOCIAL HISTORY    Social History     Socioeconomic History    Marital status:      Spouse name: Not on file    Number of children: Not on file    Years of education: Not on file    Highest education level: Not on file   Occupational History    Not on file   Tobacco Use    Smoking status: Every Day     Types: Cigarettes    Smokeless tobacco: Never   Vaping Use    Vaping Use: Never used   Substance and Sexual Activity    Alcohol use: Yes     Comment: occassional    Drug use: Never    Sexual activity: Not on file   Other Topics Concern    Not on file   Social History Narrative    Not on file     Social Determinants of Health     Financial Resource Strain: Not on file   Food Insecurity: Not on file   Transportation Needs: Not on file   Physical Activity: Not on file   Stress: Not on file   Social Connections: Not on file   Interpersonal Safety: Not on file   Housing Stability: Not on file      PHYSICAL EXAM  BP (!) 149/52   Pulse 97   Temp 97.9  F (36.6  C) (Oral)   Resp 20   Ht 1.676 m (5' 6\")   Wt 49.9 kg (110 lb)   SpO2 100%   BMI 17.75 kg/m    Body mass index is 17.75 kg/m .    GENERAL: appears healthy, NAD    HEENT: normocephalic, no scleral icterus, PERRL, mucosa moist  NECK: supple, no LAD  CV: RRR  LUNGS:  No dyspnea, clear bilat  ABDOMEN: Non distended, superior aspect of incision still not fully healed but dry and intact, some staples still in place.     EXTREMITIES:  No edema, no deformities  SKIN: warm and dry, no jaundice, no rashes  NEURO:  Cranial nerves II-XII grossly intact, alert and oriented, motor exam intact   PSYCH: appropriate mood and affect    LABS  Recent Labs   Lab Test 12/18/23  1559 12/01/23  0702 11/30/23  0842 11/29/23  1009 11/28/23  0817   WBC 11.8*  --   --   --  9.1   RBC 1.69*  --   --   --  4.09 " "  HGB 4.9* 11.7  --    < > 12.5   HCT 16.3*  --   --   --  38.2   MCV 96  --   --   --  93   MCH 29.0  --   --   --  30.6   MCHC 30.1*  --   --   --  32.7     --  322  --  253    < > = values in this interval not displayed.     Recent Labs   Lab Test 12/18/23  1559 11/30/23  0842   POTASSIUM 4.5 4.1   CHLORIDE 92* 94*   BUN 20.6 21.9       No lab results found.    Invalid input(s): \"ALKPHOSPH\"  No results found for: \"INR\"      IMAGING  EXAM: CTA ABDOMEN PELVIS WITH CONTRAST  LOCATION: [facility]  DATE: 12/18/2023     INDICATION:Status post abdominal surgery with post op hematoma. Hgb from 11 to 5. Also fell and injured left ribs, eval for rib fx and PE  COMPARISON: CT chest 12/18/2023.  TECHNIQUE: CT angiogram abdomen pelvis during arterial phase of injection of IV contrast. 2D and 3D MIP reconstructions were performed by the CT technologist. Dose reduction techniques were used.  CONTRAST: 72mL Isovue 370     FINDINGS:  ANGIOGRAM ABDOMEN/PELVIS: Infrarenal abdominal aortic aneurysm (4.6 cm) with anterior mural thrombus. No significant surrounding inflammation. Severe atherosclerotic disease with episodic mild to moderate narrowing. Severe right internal iliac artery   narrowing. Severe celiac artery origin narrowing. Superior mesenteric and bilateral renal arteries are patent. Inferior mesenteric artery not well visualized. Descending thoracic aortic aneurysm (4 cm).     LOWER CHEST: Small left pleural effusion with adjacent atelectasis.     HEPATOBILIARY: Right hepatic lobe cyst. No suspicious liver lesion. Gallbladder is unremarkable.     PANCREAS: Normal.     SPLEEN: Normal.     ADRENAL GLANDS: Normal.     KIDNEYS/BLADDER: Normal.     BOWEL: Normal.     LYMPH NODES/PERITONEUM: No enlarged lymph node. Trace abdominopelvic ascites. No large hematoma or evidence of active bleeding.     PELVIC ORGANS: Uterus is present. No adnexal mass. Nearly occlusive left gonadal vein thrombus " (8/91).     MUSCULOSKELETAL: Ventral abdominal surgical changes. Tiny lower anterior abdominal wall fluid collection (1.4 cm). No evidence of active bleeding.                                                                      IMPRESSION:  1.  Trace abdominopelvic ascites. No large hematoma or evidence of active bleeding within the abdomen or pelvis.  2.  Nearly occlusive left gonadal vein thrombus.  3.  Tiny inferoanterior abdominal wall fluid collection (1.4 cm), likely seroma.  4.  Infrarenal abdominal aortic aneurysm (4.6 cm) with anterior mural thrombus.  5.  Severe celiac artery origin narrowing.  6.  Small left pleural effusion.    EXAM: CT CHEST W/O CONTRAST  LOCATION: [facility]  DATE: 12/18/2023     INDICATION: Eval for rib.  COMPARISON: None.  TECHNIQUE: CT chest without IV contrast. Multiplanar reformats were obtained. Dose reduction techniques were used.  CONTRAST: None.     FINDINGS:   LUNGS AND PLEURA: There are minimal changes of centrilobular emphysema seen in the upper lobes bilaterally. Minimal scattered blebs in both lungs. Slight bilateral apical pleural scarring. Minimal scattered noncalcified pulmonary nodules in both lungs   with the largest seen in the left lower lobe measuring 4.9 mm in greatest dimension. Mild left pleural effusion which appears dependent in nature.     MEDIASTINUM/AXILLAE: Heavily calcified thoracic aorta with aneurysmal dilatation of both the ascending and descending thoracic aorta. The ascending thoracic aorta measures approximately 6.2 cm in greatest radial dimension and the descending thoracic   aorta 4.2 cm. There is no evidence for a rupture of the thoracic aorta. The left thyroid lobe is not seen and presumed atrophic or surgically absent.     CORONARY ARTERY CALCIFICATION: Moderate.     UPPER ABDOMEN: No significant finding.     MUSCULOSKELETAL: Moderate scattered hypertrophic changes in the spine.                                                                       IMPRESSION:   1.  No inflammatory infiltrates.     2.  Mild scattered noncalcified/indeterminate pulmonary nodules with the largest seen measuring 4.9 mm in the left lower lobe. Please refer to below reference for possible follow-up.     3. Mild left pleural effusion which appears dependent in nature.     4. Heavily calcified thoracic aortic aneurysm with the ascending thoracic aorta measuring approximately 6.2 cm in greatest radial dimension and the descending thoracic aorta 4.2 cm.     REFERENCE:  Guidelines for Management of Incidental Pulmonary Nodules Detected on CT Images: From the Fleischner Society 2017.   Guidelines apply to incidental nodules in patients who are 35 years or older.  Guidelines do not apply to lung cancer screening, patients with immunosuppression, or patients with known primary cancer.     MULTIPLE NODULES  Nodule size <6 mm  Low-risk patients: No follow-up needed.  High-risk patients: Optional follow-up at 12 months.     Nodule size 6 mm or larger  Low-risk patients: Follow-up CT at 3-6 months, then consider CT at 18-24 months.  High-risk patients: Follow-up CT at 3-6 months, then at 18-24 months if no change.  -Use most suspicious nodule as guide to management.      ASSESSMENT AND PLAN : Tammy España is a 78 year old female with hx of diabetes, AAA, benign cystadenoma s/p recent left oophorectomy, admitted with significant anemia and dizziness.  CT scan shows no signs of post operative bleeding. Finding of nearly occlusive left gonadal vein thrombus is not surprising given recent left oophorectomy and we tie off this vessel to remove specimen. No action needed for that finding.  Agree with GI consult and evaluation given significant drop in hemoglobin. Discussed with Dr. Scruggs, colon was not manipulated during surgery so okay with colonoscopy if no findings on EGD. Will continue to follow.        HANNY Leung    Patient seen and examined  with Aliza Tellez PA-C.  Agree with A/P as outline above.  Will follow peripherally.    POOL Scruggs MD  Total consult time:  50 min  Face to face time with patient:  20 min    Hui Tellez and I both examined and talked to patient.  Hui did consult note.

## 2023-12-19 NOTE — PLAN OF CARE
Surgery/POD#: Acute Anemia  Behavior & Aggression: Green  Fall Risk: Yes  Orientation: A&O x4  ABNL VS/O2: VSS on RA - Tele NSR  ABNL Labs: Hgb 4.9 -- 6.8 currently transfusing 1 unit of PRBC   Pain Management: denies pain medication  Bowel/Bladder: Continent B&B - stool sample still needed   Diet: Regular with Fluid Restriction 1800ml - has been NPO since midnight for possible procedures   Activity Level: Ax1 GBW  Tests/Procedures: GI & GYN/ONC consults   Anticipated  DC Date: TBD

## 2023-12-19 NOTE — ED PROVIDER NOTES
MD Notification    Notified Person: MD    Notified Person Name: Boaz Ortega     Notification Date/Time: 12/19/23 04:45    Notification Interaction: Phone call    Purpose of Notification: Do we need to plan for possible colonoscopy tomorrow?    Orders Received: Can keep patient on clear liquid diet tonight. Due to findings from EGD patient will not have colonoscopy tomorrow but will instead need a consult with vascular surgery for possible stent.     Comments:

## 2023-12-19 NOTE — ED NOTES
[facility]  ED Nurse Handoff Report    ED Chief complaint: Dizziness and Abnormal Labs      ED Diagnosis:   Final diagnoses:   Anemia due to blood loss, acute       Code Status: Full Code    Allergies:   Allergies   Allergen Reactions    Sulfa Antibiotics Swelling     Swelling of lips       Patient Story: Been feeling dizzy since Thursday, took a fall before going to see a doctor. Having left side of rib pain, had hemoglobin low at doctors appointment. S/p abdomen surgery with benign tumor removal.    Focused Assessment:  Pale skin, otherwise stable. Hgb: 4.9, CTA/pelvis    Treatments and/or interventions provided: 2 units PRBC's   Patient's response to treatments and/or interventions: pending results     To be done/followed up on inpatient unit:  TBD    Does this patient have any cognitive concerns?:  n/a    Activity level - Baseline/Home:  Independent  Activity Level - Current:   Stand with Assist    Patient's Preferred language: English   Needed?: No    Isolation: None  Infection: Not Applicable  Patient tested for COVID 19 prior to admission: NO  Bariatric?: No    Vital Signs:   Vitals:    12/18/23 1800 12/18/23 1810 12/18/23 1835 12/18/23 1900   BP: (!) 148/55 (!) 140/56 (!) 143/57 (!) 149/52   Pulse: 93 93 105 97   Resp:  20 20    Temp:  97.8  F (36.6  C) 97.9  F (36.6  C)    TempSrc:  Oral Oral    SpO2: 98% 97% 100%    Weight:       Height:           Cardiac Rhythm:     Was the PSS-3 completed:   Yes  What interventions are required if any?               Family Comments: n/a  OBS brochure/video discussed/provided to patient/family: N/A              For the majority of the shift this patient's behavior was Green.   Behavioral interventions performed were n/a.    ED NURSE PHONE NUMBER: [phone]

## 2023-12-19 NOTE — CONSULTS
VASCULAR SURGERY CONSULT NOTE    VASCULAR SURGEON: Dr. 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  This was done November 27, 2023.    The patient is coming in with the severe anemia of hemoglobin 4's.  Hemoccult is positive.  She underwent EGD with findings of what is concerning for ischemic duodenal ulcer.    On evaluation of her CTA, she has occluded celiac and highly stenosed SMA.  Given there endoscopic findings, we think that her vascular surgeon is causing the issue.    We think it is beneficial to stent SMA revascularize to avoid further bleeding issues with ischemic ulcers.    We discussed however, that this could exacerbate bleeding and short-term with increased blood flow to already ulcerated areas.    After discussing the risks and benefits of the surgery, the patient and family are agreeable to proceeding with SMA stenting.    Will discussed it with the or  for availability for timing tomorrow, if not we will try to pursue it on Thursday.    This will be done through left brachial open approach for access.    RECOMMENDATION:  - N.p.o. at midnight  - Fluids at overnight  - Will try to add on the case for tomorrow    Seen and discussed with staff, Dr. Marcela Clark MD      HPI:  Tammy Osorio is a 78 year old female who was seen today in consultation for ischemic duodenal ulcer and gastric ulcer in the setting of severe GI bleeding.    She denies recent history of rapid weight loss.  No postprandial pain.  However, she noticed that she has  been losing weight for a long time.  She was around 140 pounds at her maximum weight.    REVIEW OF SYSTEMS:    A 12 point ROS was reviewed and except for what is listed in the HPI above, all others are negative    PHH:    Past Medical History:   Diagnosis Date    Anxiety     Aortic regurgitation     Ascending aortic aneurysm (H24)     Diabetes (H)     Hypertension     Osteopenia     Pelvic mass     Pulmonary emphysema (H)     Sciatica, unspecified laterality         Past Surgical History:   Procedure Laterality Date    HYSTERECTOMY TOTAL ABDOMINAL, BILATERAL SALPINGO-OOPHORECTOMY, COMBINED Bilateral 11/27/2023    Procedure: EXPLORATORY LAPAROTOMY , BILATERAL SALPINGO OOPHORECTOMY , PELVIC WASHING;  Surgeon: Naye Scruggs MD;  Location:  OR       ALLERGIES:    Allergies   Allergen Reactions    Sulfa Antibiotics Swelling     Swelling of lips       MEDS:    Current Facility-Administered Medications:     acetaminophen (TYLENOL) tablet 650 mg, 650 mg, Oral, Q4H PRN **OR** acetaminophen (TYLENOL) Suppository 650 mg, 650 mg, Rectal, Q4H PRN, Meredith Lowe MD    amLODIPine (NORVASC) tablet 10 mg, 10 mg, Oral, Daily, Meredith Lowe MD, 10 mg at 12/19/23 0838    calcium carbonate (TUMS) chewable tablet 1,000 mg, 1,000 mg, Oral, 4x Daily PRN, Meredith Lowe MD    glucose gel 15-30 g, 15-30 g, Oral, Q15 Min PRN **OR** dextrose 50 % injection 25-50 mL, 25-50 mL, Intravenous, Q15 Min PRN **OR** glucagon injection 1 mg, 1 mg, Subcutaneous, Q15 Min PRN, Addi Mitchell MD    flumazenil (ROMAZICON) injection 0.2 mg, 0.2 mg, Intravenous, q1 min prn, Boaz Ortega MD    insulin aspart (NovoLOG) injection (RAPID ACTING), 1-6 Units, Subcutaneous, Q4H, Meredith Lowe MD, 1 Units at 12/19/23 1401    lidocaine (LMX4) cream, , Topical, Q1H PRN, Meredith Lowe MD    lidocaine 1 % 0.1-1 mL, 0.1-1 mL, Other, Q1H PRN, Meredith Lowe MD    lisinopril (ZESTRIL) tablet 20 mg, 20 mg, Oral, QPM,  "Meredith Lowe MD    metFORMIN (GLUCOPHAGE XR) 24 hr tablet 1,000 mg, 1,000 mg, Oral, BID w/meals, Meredith Lowe MD, 1,000 mg at 12/19/23 0838    metoprolol (LOPRESSOR) injection 2.5 mg, 2.5 mg, Intravenous, Q4H PRN, Meredith Lowe MD    metoprolol tartrate (LOPRESSOR) tablet 50 mg, 50 mg, Oral, Daily, Meredith Lowe MD, 50 mg at 12/19/23 0838    naloxone (NARCAN) injection 0.2 mg, 0.2 mg, Intravenous, Q2 Min PRN **OR** naloxone (NARCAN) injection 0.4 mg, 0.4 mg, Intravenous, Q2 Min PRN **OR** naloxone (NARCAN) injection 0.2 mg, 0.2 mg, Intramuscular, Q2 Min PRN **OR** naloxone (NARCAN) injection 0.4 mg, 0.4 mg, Intramuscular, Q2 Min PRN, Meredith Lowe MD    pantoprazole (PROTONIX) IV push injection 40 mg, 40 mg, Intravenous, BID, Meredith Lowe MD, 40 mg at 12/19/23 0838    senna-docusate (SENOKOT-S/PERICOLACE) 8.6-50 MG per tablet 1 tablet, 1 tablet, Oral, BID PRN **OR** senna-docusate (SENOKOT-S/PERICOLACE) 8.6-50 MG per tablet 2 tablet, 2 tablet, Oral, BID PRN, Meredith Lowe MD    sertraline (ZOLOFT) tablet 50 mg, 50 mg, Oral, QPM, Meredith Lowe MD    sodium chloride (PF) 0.9% PF flush 3 mL, 3 mL, Intracatheter, Q8H, Meredith Lowe MD, 3 mL at 12/19/23 0447    sodium chloride (PF) 0.9% PF flush 3 mL, 3 mL, Intracatheter, q1 min prn, Meredith Lowe MD    traMADol (ULTRAM) half-tab 25-50 mg, 25-50 mg, Oral, Q6H PRN, Meredith Lowe MD    SOCIAL HABITS:   Social History    Substance and Sexual Activity      Alcohol use: Yes        Comment: occassional      History   Drug Use Unknown      History   Smoking Status    Every Day    Types: Cigarettes   Smokeless Tobacco    Never        FAMILY HISTORY:  History reviewed. No pertinent family history.    PE:  /48 (BP Location: Right arm)   Pulse 76   Temp 97.8  F (36.6  C) (Oral)   Resp 16   Ht 1.676 m (5' 6\")   Wt 46 kg (101 lb 6.4 oz)   SpO2 97%   BMI 16.37 kg/m    Wt Readings from Last 1 Encounters: "   23 46 kg (101 lb 6.4 oz)     Body mass index is 16.37 kg/m .    EXAM:  GENERAL: This is a well-developed 78 year old female who appears her stated age  EYES: Grossly normal.  CARDIAC:  Warm, well-perfused, RRR, palpable left brachial pulse  ABDOMEN: Soft, non-tender, midline incision in dressing  MUSCULOSKELETAL: Grossly normal and both lower extremities are intact.  HEME/LYMPH: No lymphedema  NEUROLOGIC: Focally intact, Alert and oriented x 3.   PSYCH: appropriate affect  INTEGUMENT: No open lesions or ulcers            DIAGNOSTIC STUDIES:     Images:  Echocardiogram Complete    Result Date: 2023  [id number]^[email or web address]^DOMINIC^NANCY  [facility] [address] [place] [address]  Name: LORI PETIT MRN: [id number] : 1945 Study Date: 2023 03:40 PM Age: 78 yrs Gender: Female Patient Location: [facility] Reason For Study: Atrial Fibrillation Ordering Physician: DOMINIC YOUSSEF Performed By: Gurmeet Hill  BSA: 1.5 m2 Height: 66 in Weight: 101 lb HR: 76 BP: 148/63 mmHg ______________________________________________________________________________ Procedure Complete Echo Adult. Optison (NDC #3718-9152) given intravenously. ______________________________________________________________________________ Interpretation Summary  1. The left ventricle is normal in size. The visual ejection fraction is estimated at 55%. 2. The right ventricle is normal in structure, function and size. 3. There is moderate (2+) aortic regurgitation. 4. Ascending aorta dilatation is present. 5.6cm.  Echo 2023 from [facility]: EF 60%, 4+ AI, mild AS, aorta 5.7cm. ______________________________________________________________________________ Left Ventricle The left ventricle is normal in size. There is normal left ventricular wall thickness. The visual ejection fraction is estimated at 55%. Grade I or early diastolic dysfunction. Normal left  ventricular wall motion.  Right Ventricle The right ventricle is normal in structure, function and size.  Atria The left atrium is mildly dilated. The right atrium is mild to moderately dilated. There is no atrial shunt seen.  Mitral Valve There is no mitral regurgitation noted.  Tricuspid Valve There is mild (1+) tricuspid regurgitation.  Aortic Valve There is mild trileaflet aortic sclerosis. There is moderate (2+) aortic regurgitation. No aortic stenosis is present.  Pulmonic Valve The pulmonic valve is normal in structure and function.  Vessels Ascending aorta dilatation is present. Dilation of the inferior vena cava is present with abnormal respiratory variation in diameter.  Pericardium There is no pericardial effusion.  Rhythm Sinus rhythm was noted. ______________________________________________________________________________ MMode/2D Measurements & Calculations  IVSd: 0.90 cm LVIDd: 4.6 cm LVIDs: 3.6 cm LVPWd: 0.84 cm FS: 21.1 % LV mass(C)d: 131.8 grams LV mass(C)dI: 88.1 grams/m2 Ao root diam: 3.5 cm LA dimension: 3.3 cm asc Aorta Diam: 5.6 cm LA/Ao: 0.95 LVOT diam: 2.0 cm LVOT area: 3.2 cm2 Ao root diam index Ht(cm/m): 2.1 Ao root diam index BSA (cm/m2): 2.3 Asc Ao diam index BSA (cm/m2): 3.8 Asc Ao diam index Ht(cm/m): 3.4 RV Base: 3.4 cm RWT: 0.37 TAPSE: 2.2 cm  Doppler Measurements & Calculations MV E max arnulfo: 125.0 cm/sec MV A max arnulfo: 66.3 cm/sec MV E/A: 1.9 MV dec time: 0.16 sec Ao V2 max: 250.0 cm/sec Ao max P.0 mmHg Ao V2 mean: 173.0 cm/sec Ao mean P.0 mmHg Ao V2 VTI: 42.6 cm CY(I,D): 2.1 cm2 CY(V,D): 1.8 cm2 AI P1/2t: 457.4 msec LV V1 max P.0 mmHg LV V1 max: 141.0 cm/sec LV V1 VTI: 28.1 cm SV(LVOT): 90.0 ml SI(LVOT): 60.2 ml/m2 PA acc time: 0.14 sec TR max arnulfo: 286.3 cm/sec TR max P.9 mmHg AV Arnulfo Ratio (DI): 0.56 CY Index (cm2/m2): 1.4 E/E' av.9 Lateral E/e': 15.7 Medial E/e': 28.0 RV S Arnulfo: 16.4 cm/sec   ______________________________________________________________________________ Report approved by: Farideh Alvarado 12/19/2023 04:43 PM       CTA Abdomen Pelvis with Contrast    Result Date: 12/18/2023  EXAM: CTA ABDOMEN PELVIS WITH CONTRAST LOCATION: [facility] DATE: 12/18/2023 INDICATION:Status post abdominal surgery with post op hematoma. Hgb from 11 to 5. Also fell and injured left ribs, eval for rib fx and PE COMPARISON: CT chest 12/18/2023. TECHNIQUE: CT angiogram abdomen pelvis during arterial phase of injection of IV contrast. 2D and 3D MIP reconstructions were performed by the CT technologist. Dose reduction techniques were used. CONTRAST: 72mL Isovue 370 FINDINGS: ANGIOGRAM ABDOMEN/PELVIS: Infrarenal abdominal aortic aneurysm (4.6 cm) with anterior mural thrombus. No significant surrounding inflammation. Severe atherosclerotic disease with episodic mild to moderate narrowing. Severe right internal iliac artery narrowing. Severe celiac artery origin narrowing. Superior mesenteric and bilateral renal arteries are patent. Inferior mesenteric artery not well visualized. Descending thoracic aortic aneurysm (4 cm). LOWER CHEST: Small left pleural effusion with adjacent atelectasis. HEPATOBILIARY: Right hepatic lobe cyst. No suspicious liver lesion. Gallbladder is unremarkable. PANCREAS: Normal. SPLEEN: Normal. ADRENAL GLANDS: Normal. KIDNEYS/BLADDER: Normal. BOWEL: Normal. LYMPH NODES/PERITONEUM: No enlarged lymph node. Trace abdominopelvic ascites. No large hematoma or evidence of active bleeding. PELVIC ORGANS: Uterus is present. No adnexal mass. 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 MUSCULOSKELETAL: Ventral abdominal surgical changes. Tiny lower anterior abdominal wall fluid collection (1.4 cm). No evidence of active bleeding.     IMPRESSION: 1.  Trace abdominopelvic ascites. No large hematoma or evidence of active bleeding within the abdomen or pelvis. 2.   Nearly occlusive left gonadal vein thrombus. 3.  Tiny inferoanterior abdominal wall fluid collection (1.4 cm), likely seroma. 4.  Infrarenal abdominal aortic aneurysm (4.6 cm) with anterior mural thrombus. 5.  Severe celiac artery origin narrowing. 6.  Small left pleural effusion. Findings were discussed with Dr. Ozuna on 12/18/2023 at 7:23 PM    Chest CT w/o contrast    Result Date: 12/18/2023  EXAM: CT CHEST W/O CONTRAST LOCATION: [facility] DATE: 12/18/2023 INDICATION: Eval for rib. COMPARISON: None. TECHNIQUE: CT chest without IV contrast. Multiplanar reformats were obtained. Dose reduction techniques were used. CONTRAST: None. FINDINGS: LUNGS AND PLEURA: There are minimal changes of centrilobular emphysema seen in the upper lobes bilaterally. Minimal scattered blebs in both lungs. Slight bilateral apical pleural scarring. Minimal scattered noncalcified pulmonary nodules in both lungs with the largest seen in the left lower lobe measuring 4.9 mm in greatest dimension. Mild left pleural effusion which appears dependent in nature. MEDIASTINUM/AXILLAE: Heavily calcified thoracic aorta with aneurysmal dilatation of both the ascending and descending thoracic aorta. The ascending thoracic aorta measures approximately 6.2 cm in greatest radial dimension and the descending thoracic aorta 4.2 cm. There is no evidence for a rupture of the thoracic aorta. The left thyroid lobe is not seen and presumed atrophic or surgically absent. CORONARY ARTERY CALCIFICATION: Moderate. UPPER ABDOMEN: No significant finding. MUSCULOSKELETAL: Moderate scattered hypertrophic changes in the spine.     IMPRESSION: 1.  No inflammatory infiltrates. 2.  Mild scattered noncalcified/indeterminate pulmonary nodules with the largest seen measuring 4.9 mm in the left lower lobe. Please refer to below reference for possible follow-up. 3. Mild left pleural effusion which appears dependent in nature. 4. Heavily calcified  thoracic aortic aneurysm with the ascending thoracic aorta measuring approximately 6.2 cm in greatest radial dimension and the descending thoracic aorta 4.2 cm. REFERENCE: Guidelines for Management of Incidental Pulmonary Nodules Detected on CT Images: From the Fleischner Society 2017. Guidelines apply to incidental nodules in patients who are 35 years or older. Guidelines do not apply to lung cancer screening, patients with immunosuppression, or patients with known primary cancer. MULTIPLE NODULES Nodule size <6 mm Low-risk patients: No follow-up needed. High-risk patients: Optional follow-up at 12 months. Nodule size 6 mm or larger Low-risk patients: Follow-up CT at 3-6 months, then consider CT at 18-24 months. High-risk patients: Follow-up CT at 3-6 months, then at 18-24 months if no change. -Use most suspicious nodule as guide to management.        LABS:      Sodium   Date Value Ref Range Status   12/19/2023 135 135 - 145 mmol/L Final     Comment:     Reference intervals for this test were updated on 09/26/2023 to more accurately reflect our healthy population. There may be differences in the flagging of prior results with similar values performed with this method. Interpretation of those prior results can be made in the context of the updated reference intervals.    12/18/2023 129 (L) 135 - 145 mmol/L Final     Comment:     Reference intervals for this test were updated on 09/26/2023 to more accurately reflect our healthy population. There may be differences in the flagging of prior results with similar values performed with this method. Interpretation of those prior results can be made in the context of the updated reference intervals.    12/02/2023 130 (L) 135 - 145 mmol/L Final     Comment:     Reference intervals for this test were updated on 09/26/2023 to more accurately reflect our healthy population. There may be differences in the flagging of prior results with similar values performed with this method.  Interpretation of those prior results can be made in the context of the updated reference intervals.      Urea Nitrogen   Date Value Ref Range Status   12/19/2023 15.4 8.0 - 23.0 mg/dL Final   12/18/2023 20.6 8.0 - 23.0 mg/dL Final   11/30/2023 21.9 8.0 - 23.0 mg/dL Final     Hemoglobin   Date Value Ref Range Status   12/19/2023 8.3 (L) 11.7 - 15.7 g/dL Final   12/19/2023 6.8 (LL) 11.7 - 15.7 g/dL Final   12/18/2023 4.9 (LL) 11.7 - 15.7 g/dL Final     Platelet Count   Date Value Ref Range Status   12/19/2023 251 150 - 450 10e3/uL Final   12/18/2023 356 150 - 450 10e3/uL Final   11/30/2023 322 150 - 450 10e3/uL Final     Vascular surgery attending staff note:   I have seen and examined the patient myself. I have personally reviewed the imaging as well. I agree with the documentation by our fellow, Dr. De La Cruz. Patient is a 78 year old female with recent abdominal surgery on 11/27/2023. Ex-lap, bilateral salpingo-oophorectomy and pelvic washings. Benign pathology.  Admitted with severe anemia and no overt signs of bleeding (on CT scan). EGD shows ischemic ulceration of the foregut.   CTA shows an occluded celiac artery and severe stenosis of SMA.   We will plan for stenting of the SMA through a left brachial approach.   Discussed with patient and family.     AROLDO DANIELLE M.D.

## 2023-12-19 NOTE — PROGRESS NOTES
[facility]    Internal Medicine Hospitalist Progress Note  12/19/2023  I evaluated patient on the above date.    Gus Solorzano Jr., MD  [phone] (p)  Text Page  Vocera        Assessment & Plan New actions/orders today (12/19/2023) are underlined. All lab results in the assessment and plan were reviewed.    Tammy Osorio is a 78 year old female with past medical history significant for HTN; DM2; and left adnexal mass s/p ex-lap, bilateral salpingo-oophorectomy and removal of mass (11/27/2023); who presented 12/18/2023 to the ED from her primary care clinic for evaluation of acute severe anemia.    On initial evaluation, was afebrile, /26; HR 90. Labs notable for CBC with WBC 11.8, hgb 4.9, plts normal; BMP with sodium 129, chloride 92; trop 46.     CT chest w/o contrast showed no inflammatory infiltrates; mild scattered noncalcified/indeterminate pulmonary nodules with the largest seen measuring 4.9 mm in the left lower lobe; mild left pleural effusion which appeared dependent in nature; heavily calcified thoracic aortic aneurysm with the ascending thoracic aorta measuring approximately 6.2 cm in greatest radial dimension and the descending thoracic aorta 4.2 cm.     CTA abdomen/pelvis with contrast showed trace abdominopelvic ascites, no large hematoma or evidence of active bleeding within the abdomen or pelvis; nearly occlusive left gonadal vein thrombus; tiny inferoanterior abdominal wall fluid collection (1.4 cm), likely seroma; infrarenal abdominal aortic aneurysm (4.6 cm) with anterior mural thrombus; severe celiac artery origin narrowing; small left pleural effusion.      Acute normocytic anemia, concern for upper GI bleeding (possibly related to ASA).  * On 12/18, pt seen in follow-up by her primary doctor with complaints of light-headedness and weakness. She was found to have a hemoglobin of 5.0 so was sent to the ED. Notably, she did have ex-lap with BSO and adenexal  "mass removal on 11/27 (see below). This was complicated by incisional hematoma, but at the time of discharge (12/1) her hemoglobin was stable at 12.1. Appears was started on ASA for DVT prophylaxis.  * Initial presentation as above. Pt denied any hematochezia, melena, hematemesis,  hematuria, or abnormal uterine bleeding.  Transfused 2U prbc's in ED. Iron studies showed iron deficiency component; retic cound elevated; TSH normal; LDH normal; B12, haptoglobin pending. Started on IV pantoprazole on admit.  Recent Labs   Lab 12/19/23  0850 12/19/23  0316 12/18/23  1559   HGB 8.3* 6.8* 4.9*   - Continue IV pantoprazole.  - GI consulted appreciate help, - plan EGD today 12/19.  - Continue to monitor CBC - repeat in am.  - Consider prbc transfusion if hgb </= 7.0 or if significant bleeding with hemodynamic instability or if symptomatic.  - OK to proceed with EGD (do not need to wait for echo, repeat ECG).    ADDENDUM 11:59:  - Per d/w Dr. Ortega, pt with duodenal ulcers after duodenal bulb not felt to be related to ASA, biopsies obtained, ?related to vascular disease/aneurysms.  - Consult Vascular Surgery.    Left adnexal mass s/p ex lap, bilateral salpingo-oophorectomy, and pelvic washings with removal of mass (11/27/2023).  Nearly occlusive left gonadal vein thrombus, suspect related to above.   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  * Initial presentation as above. Gynecology/oncology consulted  * On 12/19, per Gyn-Onc, \"Finding of nearly occlusive left gonadal vein thrombus is not surprising given recent left oophorectomy and we tie off this vessel to remove specimen. No action needed for that finding\"  - Continue Continue PRN acetaminophen and PTA PRN tramadol for pain control; minimize opioids as able.  - Appreciate Gyn-Onc help.    Hyponatremia, suspect SIADH.  * Pt had issues with hyponatremia during her last admission as well which was suspected to be 2/2 SIADH. Sodium at " "discharge was 130.   * Sodium was 129 on admission.   * Sodium normalized 12/19.  Recent Labs   Lab 12/19/23  0850 12/18/23  1559    129*   - Continue 1800 ml fluid restriction  - Continue to monitor sodium     Question new onset atrial fibrillation with RVR vs sinus tachycardia.  * EKG in the ED notable for atrial fibrillation. Pt has no prior history of this. Suspect this was triggered by severe anemia.   * On 12/19, reviewed ECG, appears to be sinus tachycardia with frequent PAC's.  - Repeat ECG.  - Check echocardiogram.  - Discharge on Holter.  - Continue to monitor on telemetry.  - OK to proceed with EGD prior to above results.    Troponin elevation, suspect demand ischemia (type 2 NSTEMI).  * Initial presentation as above. Trop 46 on admit. Per reports, had nuclear stress study 11/1/2023 that was negative for ischemia.  * Trops subsequently \"flat\".  Recent Labs   Lab 12/18/23  2252 12/18/23  1559   CTROPT 46* 46*   - Continue to treat other issues as noted.  - Check echo as above.    Multiple pulmonary nodules.  COPD.  Tobacco dependence.  * Initial presentation as above. Pulmonary nodules noted on CT - mild scattered non-calcified/indeterminate pulmonary nodules with the largest seen measuring 4.9mm in the LLL.   - Recommend follow-up per PCP for pulmonary nodule, high risk given smoking history.  - Pt declined nicotine replacement.  - Strongly recommend tobacco cessation.     DM type II.  [PTA: metformin XR 1000 mg BID with meals.]  * Blood sugars in the 300s on admission. No recent A1C (was 7.1 in 2020).  Recent Labs   Lab 12/19/23  0850 12/19/23  0635 12/19/23  0210 12/18/23  2157 12/18/23  1559   * 169* 144* 199* 306*      No lab results found.   - Moderate CHO diet when diet advanced.  - Continue aspart ISS (medium).  - Continue to hold PTA metformin for now.    Hypertension (benign essential).  [PTA: amlodipine 10 mg daily; lisinopril 20 mg daily; metoprolol 50 mg BID.]  - Continue PTA " "amlodipine, lisinopril, and metoprolol.     Aortic aneurysms.  PAD.  * Per previous H&P: 5.5 cm on 1/2014 CT scan.  * Initial presentation and imaging as above. Chest CT showed heavily calcified thoracic aortic aneurysm with the ascending thoracic aorta measuring approximately 6.2 cm in greatest radial dimension and the descending thoracic aorta 4.2 cm. Abdominal CT showed infrarenal abdominal aortic aneurysm (4.6 cm) with anterior mural thrombus; severe celiac artery origin narrowing.  - Continue metoprolol.  - Strongly recommend tobacco cessation.  - Follow-up with Dr. Maya (CV Surgeon, [facility]) as per routine.    ADDENDUM 12:00:  - Consult Vascular Surgery as above given concern that aneurysms/PAD could be contributing to duodenal ulcers.    Clinically Significant Risk Factors Present on Admission         # Hyponatremia: Lowest Na = 129 mmol/L in last 2 days, will monitor as appropriate      # Hypoalbuminemia: Lowest albumin = 3.4 g/dL at 12/18/2023  3:59 PM, will monitor as appropriate   # Drug Induced Platelet Defect: home medication list includes an antiplatelet medication   # Hypertension: Noted on problem list      # Cachexia: Estimated body mass index is 16.37 kg/m  as calculated from the following:    Height as of this encounter: 1.676 m (5' 6\").    Weight as of this encounter: 46 kg (101 lb 6.4 oz).       # Financial/Environmental Concerns:             COVID-19 testing.  COVID-19 PCR Results           No data to display              COVID-19 Antibody Results, Testing for Immunity           No data to display                Diet: Fluid restriction 1800 ML FLUID  Combination Diet Regular Diet Adult    Prophylaxis: PCD's, ambulation.   Andres Catheter: Not present  Lines: None     Code Status: Full Code    Disposition Plan   Expected discharge: 1-2d recommended to prior living arrangement pending  above .  Entered: Gus Solorzano MD 12/19/2023, 10:40 AM     Communication.  - I d/w pt's  and " "daughter 12/19.      Interval History   Doing OK presently.  Has not been up and about yet to assess weakness.    -Data reviewed today: I reviewed all new labs and imaging over the last 24 hours. I personally reviewed the EKG tracing showing findings as above .    Physical Exam    , Blood pressure (!) 148/63, pulse 89, temperature 98.6  F (37  C), temperature source Oral, resp. rate 19, height 1.676 m (5' 6\"), weight 46 kg (101 lb 6.4 oz), SpO2 96%. O2 Device: None (Room air)    Vitals:    12/18/23 1554 12/19/23 0106   Weight: 49.9 kg (110 lb) 46 kg (101 lb 6.4 oz)     Vital Signs with Ranges  Temp:  [97.5  F (36.4  C)-98.9  F (37.2  C)] 98.6  F (37  C)  Pulse:  [] 89  Resp:  [17-20] 19  BP: (120-170)/(26-73) 148/63  SpO2:  [95 %-100 %] 96 %  Patient Vitals for the past 24 hrs:   BP Temp Temp src Pulse Resp SpO2 Height Weight   12/19/23 0735 (!) 148/63 98.6  F (37  C) Oral 89 19 96 % -- --   12/19/23 0644 (!) 146/57 -- -- -- -- -- -- --   12/19/23 0640 (!) 145/51 98.4  F (36.9  C) Oral 89 18 97 % -- --   12/19/23 0527 134/51 98.6  F (37  C) Oral 90 20 95 % -- --   12/19/23 0447 139/55 98.9  F (37.2  C) Oral 94 19 97 % -- --   12/19/23 0106 120/69 98.8  F (37.1  C) Oral 104 17 96 % 1.676 m (5' 6\") 46 kg (101 lb 6.4 oz)   12/18/23 2341 (!) 167/64 98.5  F (36.9  C) Oral 103 18 95 % -- --   12/18/23 2300 (!) 170/70 -- -- 97 20 95 % -- --   12/18/23 2117 (!) 157/58 98.7  F (37.1  C) Oral 99 20 97 % -- --   12/18/23 2100 (!) 146/65 -- -- 101 -- 97 % -- --   12/18/23 2055 138/73 98.7  F (37.1  C) -- 100 20 -- -- --   12/18/23 2052 138/73 98.7  F (37.1  C) Oral (!) 20 20 -- -- --   12/18/23 2002 -- -- -- -- -- 97 % -- --   12/18/23 2000 (!) 152/59 98.7  F (37.1  C) Oral 98 20 97 % -- --   12/18/23 1900 (!) 149/52 -- -- 97 -- -- -- --   12/18/23 1835 (!) 143/57 97.9  F (36.6  C) Oral 105 20 100 % -- --   12/18/23 1810 (!) 140/56 97.8  F (36.6  C) Oral 93 20 97 % -- --   12/18/23 1800 (!) 148/55 -- -- 93 -- 98 % -- -- " "  12/18/23 1757 136/51 97.5  F (36.4  C) -- 90 20 -- -- --   12/18/23 1645 (!) 143/49 -- -- -- -- -- -- --   12/18/23 1554 (!) 138/26 97.5  F (36.4  C) Temporal 95 18 96 % 1.676 m (5' 6\") 49.9 kg (110 lb)     I/O's Last 24 hours  I/O last 3 completed shifts:  In: 600   Out: -     Constitutional: Awake, alert, oriented, pleasant.  Respiratory: Diminished in bases. No crackles or wheezes.  Cardiovascular: RRR, no m/r/g.  GI: Soft, nt, nd, +BS.  Skin/Integumen:   Other:        Data    Labs reviewed.  Recent Labs   Lab 12/19/23  0850 12/19/23  0635 12/19/23  0316 12/19/23  0210 12/18/23  2157 12/18/23  1559   WBC 9.7  --   --   --   --  11.8*   HGB 8.3*  --  6.8*  --   --  4.9*   MCV 91  --   --   --   --  96     --   --   --   --  356     --   --   --   --  129*   POTASSIUM 4.0  --   --   --   --  4.5   CHLORIDE 101  --   --   --   --  92*   CO2 26  --   --   --   --  27   BUN 15.4  --   --   --   --  20.6   CR 0.57  --   --   --   --  0.70   ANIONGAP 8  --   --   --   --  10   ARCHANA 8.5*  --   --   --   --  9.1   * 169*  --  144*   < > 306*   ALBUMIN  --   --   --   --   --  3.4*   PROTTOTAL  --   --   --   --   --  6.0*   BILITOTAL  --   --   --   --   --  0.5   ALKPHOS  --   --   --   --   --  90   ALT  --   --   --   --   --  11   AST  --   --   --   --   --  21    < > = values in this interval not displayed.     Recent Labs   Lab Test 12/18/23  2252 12/18/23  1559   TROPONIN T HIGH SENSITIVITY 46* 46*     Recent Labs   Lab 12/19/23  0850 12/19/23  0635 12/19/23  0210 12/18/23  2157 12/18/23  1559   * 169* 144* 199* 306*      No lab results found.     No results for input(s): \"INR\", \"[id number]\" in the last 168 hours.  Recent Labs   Lab 12/19/23  0850 12/18/23  2252 12/18/23  1559   WBC 9.7  --  11.8*   LDH  --  153  --        MICRO:  CULTURES (INCLUDING BLOOD AND URINE):  No lab results found in last 7 days.    Recent Results (from the past 24 hour(s))   Chest CT w/o contrast    " Narrative    EXAM: CT CHEST W/O CONTRAST  LOCATION: [facility]  DATE: 12/18/2023    INDICATION: Eval for rib.  COMPARISON: None.  TECHNIQUE: CT chest without IV contrast. Multiplanar reformats were obtained. Dose reduction techniques were used.  CONTRAST: None.    FINDINGS:   LUNGS AND PLEURA: There are minimal changes of centrilobular emphysema seen in the upper lobes bilaterally. Minimal scattered blebs in both lungs. Slight bilateral apical pleural scarring. Minimal scattered noncalcified pulmonary nodules in both lungs   with the largest seen in the left lower lobe measuring 4.9 mm in greatest dimension. Mild left pleural effusion which appears dependent in nature.    MEDIASTINUM/AXILLAE: Heavily calcified thoracic aorta with aneurysmal dilatation of both the ascending and descending thoracic aorta. The ascending thoracic aorta measures approximately 6.2 cm in greatest radial dimension and the descending thoracic   aorta 4.2 cm. There is no evidence for a rupture of the thoracic aorta. The left thyroid lobe is not seen and presumed atrophic or surgically absent.    CORONARY ARTERY CALCIFICATION: Moderate.    UPPER ABDOMEN: No significant finding.    MUSCULOSKELETAL: Moderate scattered hypertrophic changes in the spine.      Impression    IMPRESSION:   1.  No inflammatory infiltrates.    2.  Mild scattered noncalcified/indeterminate pulmonary nodules with the largest seen measuring 4.9 mm in the left lower lobe. Please refer to below reference for possible follow-up.    3. Mild left pleural effusion which appears dependent in nature.    4. Heavily calcified thoracic aortic aneurysm with the ascending thoracic aorta measuring approximately 6.2 cm in greatest radial dimension and the descending thoracic aorta 4.2 cm.    REFERENCE:  Guidelines for Management of Incidental Pulmonary Nodules Detected on CT Images: From the Fleischner Society 2017.   Guidelines apply to incidental nodules in  patients who are 35 years or older.  Guidelines do not apply to lung cancer screening, patients with immunosuppression, or patients with known primary cancer.    MULTIPLE NODULES  Nodule size <6 mm  Low-risk patients: No follow-up needed.  High-risk patients: Optional follow-up at 12 months.    Nodule size 6 mm or larger  Low-risk patients: Follow-up CT at 3-6 months, then consider CT at 18-24 months.  High-risk patients: Follow-up CT at 3-6 months, then at 18-24 months if no change.  -Use most suspicious nodule as guide to management.   CTA Abdomen Pelvis with Contrast    Narrative    EXAM: CTA ABDOMEN PELVIS WITH CONTRAST  LOCATION: [facility]  DATE: 12/18/2023    INDICATION:Status post abdominal surgery with post op hematoma. Hgb from 11 to 5. Also fell and injured left ribs, eval for rib fx and PE  COMPARISON: CT chest 12/18/2023.  TECHNIQUE: CT angiogram abdomen pelvis during arterial phase of injection of IV contrast. 2D and 3D MIP reconstructions were performed by the CT technologist. Dose reduction techniques were used.  CONTRAST: 72mL Isovue 370    FINDINGS:  ANGIOGRAM ABDOMEN/PELVIS: Infrarenal abdominal aortic aneurysm (4.6 cm) with anterior mural thrombus. No significant surrounding inflammation. Severe atherosclerotic disease with episodic mild to moderate narrowing. Severe right internal iliac artery   narrowing. Severe celiac artery origin narrowing. Superior mesenteric and bilateral renal arteries are patent. Inferior mesenteric artery not well visualized. Descending thoracic aortic aneurysm (4 cm).    LOWER CHEST: Small left pleural effusion with adjacent atelectasis.    HEPATOBILIARY: Right hepatic lobe cyst. No suspicious liver lesion. Gallbladder is unremarkable.    PANCREAS: Normal.    SPLEEN: Normal.    ADRENAL GLANDS: Normal.    KIDNEYS/BLADDER: Normal.    BOWEL: Normal.    LYMPH NODES/PERITONEUM: No enlarged lymph node. Trace abdominopelvic ascites. No large hematoma  or evidence of active bleeding.    PELVIC ORGANS: Uterus is present. No adnexal mass. 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    MUSCULOSKELETAL: Ventral abdominal surgical changes. Tiny lower anterior abdominal wall fluid collection (1.4 cm). No evidence of active bleeding.      Impression    IMPRESSION:  1.  Trace abdominopelvic ascites. No large hematoma or evidence of active bleeding within the abdomen or pelvis.  2.  Nearly occlusive left gonadal vein thrombus.  3.  Tiny inferoanterior abdominal wall fluid collection (1.4 cm), likely seroma.  4.  Infrarenal abdominal aortic aneurysm (4.6 cm) with anterior mural thrombus.  5.  Severe celiac artery origin narrowing.  6.  Small left pleural effusion.    Findings were discussed with Dr. Ozuna on 12/18/2023 at 7:23 PM       Medications   All medications were reviewed.    Infusions:    Scheduled Medications:   amLODIPine  10 mg Oral Daily    insulin aspart  1-6 Units Subcutaneous Q4H    lisinopril  20 mg Oral QPM    metFORMIN  1,000 mg Oral BID w/meals    metoprolol tartrate  50 mg Oral Daily    pantoprazole  40 mg Intravenous BID    sertraline  50 mg Oral QPM    sodium chloride (PF)  3 mL Intracatheter Q8H     PRN Medications:  acetaminophen **OR** acetaminophen, calcium carbonate, glucose **OR** dextrose **OR** glucagon, lidocaine 4%, lidocaine (buffered or not buffered), metoprolol, naloxone **OR** naloxone **OR** naloxone **OR** naloxone, senna-docusate **OR** senna-docusate, sodium chloride (PF), traMADol

## 2023-12-20 LAB
ANION GAP SERPL CALCULATED.3IONS-SCNC: 8 MMOL/L (ref 7–15)
APTT PPP: 28 SECONDS (ref 22–38)
BASOPHILS # BLD AUTO: 0 10E3/UL (ref 0–0.2)
BASOPHILS NFR BLD AUTO: 0 %
BUN SERPL-MCNC: 15.2 MG/DL (ref 8–23)
CALCIUM SERPL-MCNC: 8.4 MG/DL (ref 8.8–10.2)
CHLORIDE SERPL-SCNC: 102 MMOL/L (ref 98–107)
CREAT SERPL-MCNC: 0.67 MG/DL (ref 0.51–0.95)
DEPRECATED HCO3 PLAS-SCNC: 24 MMOL/L (ref 22–29)
EGFRCR SERPLBLD CKD-EPI 2021: 89 ML/MIN/1.73M2
EOSINOPHIL # BLD AUTO: 0.1 10E3/UL (ref 0–0.7)
EOSINOPHIL NFR BLD AUTO: 1 %
ERYTHROCYTE [DISTWIDTH] IN BLOOD BY AUTOMATED COUNT: 15.5 % (ref 10–15)
GLUCOSE BLDC GLUCOMTR-MCNC: 147 MG/DL (ref 70–99)
GLUCOSE BLDC GLUCOMTR-MCNC: 154 MG/DL (ref 70–99)
GLUCOSE BLDC GLUCOMTR-MCNC: 155 MG/DL (ref 70–99)
GLUCOSE BLDC GLUCOMTR-MCNC: 164 MG/DL (ref 70–99)
GLUCOSE BLDC GLUCOMTR-MCNC: 182 MG/DL (ref 70–99)
GLUCOSE BLDC GLUCOMTR-MCNC: 243 MG/DL (ref 70–99)
GLUCOSE SERPL-MCNC: 174 MG/DL (ref 70–99)
GLUCOSE SERPL-MCNC: 176 MG/DL (ref 70–99)
HCT VFR BLD AUTO: 27.3 % (ref 35–47)
HGB BLD-MCNC: 8.7 G/DL (ref 11.7–15.7)
IMM GRANULOCYTES # BLD: 0.1 10E3/UL
IMM GRANULOCYTES NFR BLD: 1 %
INR PPP: 1.09 (ref 0.85–1.15)
LYMPHOCYTES # BLD AUTO: 0.7 10E3/UL (ref 0.8–5.3)
LYMPHOCYTES NFR BLD AUTO: 10 %
MCH RBC QN AUTO: 29.2 PG (ref 26.5–33)
MCHC RBC AUTO-ENTMCNC: 31.9 G/DL (ref 31.5–36.5)
MCV RBC AUTO: 92 FL (ref 78–100)
MONOCYTES # BLD AUTO: 0.8 10E3/UL (ref 0–1.3)
MONOCYTES NFR BLD AUTO: 11 %
NEUTROPHILS # BLD AUTO: 5.8 10E3/UL (ref 1.6–8.3)
NEUTROPHILS NFR BLD AUTO: 77 %
NRBC # BLD AUTO: 0 10E3/UL
NRBC BLD AUTO-RTO: 0 /100
PLATELET # BLD AUTO: 271 10E3/UL (ref 150–450)
POTASSIUM SERPL-SCNC: 4.4 MMOL/L (ref 3.4–5.3)
RBC # BLD AUTO: 2.98 10E6/UL (ref 3.8–5.2)
SODIUM SERPL-SCNC: 134 MMOL/L (ref 135–145)
WBC # BLD AUTO: 7.5 10E3/UL (ref 4–11)

## 2023-12-20 PROCEDURE — 99232 SBSQ HOSP IP/OBS MODERATE 35: CPT | Performed by: INTERNAL MEDICINE

## 2023-12-20 PROCEDURE — 85610 PROTHROMBIN TIME: CPT | Performed by: STUDENT IN AN ORGANIZED HEALTH CARE EDUCATION/TRAINING PROGRAM

## 2023-12-20 PROCEDURE — 82374 ASSAY BLOOD CARBON DIOXIDE: CPT | Performed by: INTERNAL MEDICINE

## 2023-12-20 PROCEDURE — 36415 COLL VENOUS BLD VENIPUNCTURE: CPT | Performed by: INTERNAL MEDICINE

## 2023-12-20 PROCEDURE — 120N000001 HC R&B MED SURG/OB

## 2023-12-20 PROCEDURE — 250N000011 HC RX IP 250 OP 636: Performed by: STUDENT IN AN ORGANIZED HEALTH CARE EDUCATION/TRAINING PROGRAM

## 2023-12-20 PROCEDURE — 83516 IMMUNOASSAY NONANTIBODY: CPT | Performed by: INTERNAL MEDICINE

## 2023-12-20 PROCEDURE — 85025 COMPLETE CBC W/AUTO DIFF WBC: CPT | Performed by: INTERNAL MEDICINE

## 2023-12-20 PROCEDURE — 250N000013 HC RX MED GY IP 250 OP 250 PS 637: Performed by: STUDENT IN AN ORGANIZED HEALTH CARE EDUCATION/TRAINING PROGRAM

## 2023-12-20 PROCEDURE — 36415 COLL VENOUS BLD VENIPUNCTURE: CPT | Performed by: STUDENT IN AN ORGANIZED HEALTH CARE EDUCATION/TRAINING PROGRAM

## 2023-12-20 PROCEDURE — 258N000003 HC RX IP 258 OP 636: Performed by: INTERNAL MEDICINE

## 2023-12-20 PROCEDURE — C9113 INJ PANTOPRAZOLE SODIUM, VIA: HCPCS | Performed by: STUDENT IN AN ORGANIZED HEALTH CARE EDUCATION/TRAINING PROGRAM

## 2023-12-20 PROCEDURE — 85730 THROMBOPLASTIN TIME PARTIAL: CPT | Performed by: STUDENT IN AN ORGANIZED HEALTH CARE EDUCATION/TRAINING PROGRAM

## 2023-12-20 PROCEDURE — 82947 ASSAY GLUCOSE BLOOD QUANT: CPT | Performed by: STUDENT IN AN ORGANIZED HEALTH CARE EDUCATION/TRAINING PROGRAM

## 2023-12-20 RX ADMIN — METOPROLOL TARTRATE 50 MG: 50 TABLET, FILM COATED ORAL at 08:39

## 2023-12-20 RX ADMIN — INSULIN ASPART 1 UNITS: 100 INJECTION, SOLUTION INTRAVENOUS; SUBCUTANEOUS at 15:45

## 2023-12-20 RX ADMIN — LISINOPRIL 20 MG: 20 TABLET ORAL at 19:43

## 2023-12-20 RX ADMIN — INSULIN ASPART 1 UNITS: 100 INJECTION, SOLUTION INTRAVENOUS; SUBCUTANEOUS at 01:06

## 2023-12-20 RX ADMIN — PANTOPRAZOLE SODIUM 40 MG: 40 INJECTION, POWDER, FOR SOLUTION INTRAVENOUS at 08:40

## 2023-12-20 RX ADMIN — INSULIN ASPART 1 UNITS: 100 INJECTION, SOLUTION INTRAVENOUS; SUBCUTANEOUS at 10:28

## 2023-12-20 RX ADMIN — INSULIN ASPART 1 UNITS: 100 INJECTION, SOLUTION INTRAVENOUS; SUBCUTANEOUS at 21:18

## 2023-12-20 RX ADMIN — PANTOPRAZOLE SODIUM 40 MG: 40 INJECTION, POWDER, FOR SOLUTION INTRAVENOUS at 19:43

## 2023-12-20 RX ADMIN — INSULIN ASPART 1 UNITS: 100 INJECTION, SOLUTION INTRAVENOUS; SUBCUTANEOUS at 06:19

## 2023-12-20 RX ADMIN — AMLODIPINE BESYLATE 10 MG: 5 TABLET ORAL at 08:40

## 2023-12-20 RX ADMIN — INSULIN ASPART 3 UNITS: 100 INJECTION, SOLUTION INTRAVENOUS; SUBCUTANEOUS at 17:36

## 2023-12-20 RX ADMIN — SODIUM CHLORIDE: 9 INJECTION, SOLUTION INTRAVENOUS at 00:15

## 2023-12-20 RX ADMIN — SERTRALINE HYDROCHLORIDE 50 MG: 50 TABLET ORAL at 19:43

## 2023-12-20 ASSESSMENT — ACTIVITIES OF DAILY LIVING (ADL)
ADLS_ACUITY_SCORE: 22
DEPENDENT_IADLS:: INDEPENDENT
ADLS_ACUITY_SCORE: 22

## 2023-12-20 NOTE — PROGRESS NOTES
Brief Gyn Onc note     Chart reviewed. Duodenal ulcers noted on EGD, plan for SMA stent.  Will no longer follow. Postoperative follow up as scheduled. Please call with any questions or concerns.     Aliza Tellez PA-C on 12/20/2023 at 10:15 AM

## 2023-12-20 NOTE — CONSULTS
Care Management Initial Consult      General Information  Assessment completed with: Patient, Spouse or significant other, -chart review, Ben  Type of CM/SW Visit: Initial Assessment    Primary Care Provider verified and updated as needed: Yes   Readmission within the last 30 days: other (see comments) (new diagnosis)      Reason for Consult: discharge planning ([facility] patient)  Advance Care Planning: Advance Care Planning Reviewed: verified with patient, no concerns identified          Communication Assessment  Patient's communication style: spoken language (English or Bilingual)    Hearing Difficulty or Deaf: no   Wear Glasses or Blind: yes    Cognitive  Cognitive/Neuro/Behavioral: WDL                      Living Environment:   People in home: child(kt), adult, spouse     Current living Arrangements: house      Able to return to prior arrangements: yes       Family/Social Support:  Care provided by: self, spouse/significant other, child(kt), friend  Provides care for: no one  Marital Status:   , Children  Peter       Description of Support System: Supportive, Involved    Support Assessment: Adequate family and caregiver support, Adequate social supports    Current Resources:   Patient receiving home care services: Yes  Skilled Home Care Services: Skilled Nursing  Community Resources: None  Equipment currently used at home: none  Supplies currently used at home:      Employment/Financial:  Employment Status: retired        Financial Concerns: none   Referral to Financial Worker: No       Does the patient's insurance plan have a 3 day qualifying hospital stay waiver?  No    Lifestyle & Psychosocial Needs:  Social Determinants of Health     Food Insecurity: Not on file   Depression: Not on file   Housing Stability: Not on file   Tobacco Use: High Risk (12/19/2023)    Patient History     Smoking Tobacco Use: Every Day     Smokeless Tobacco Use: Never     Passive Exposure: Not on file    Financial Resource Strain: Not on file   Alcohol Use: Not on file   Transportation Needs: Not on file   Physical Activity: Not on file   Interpersonal Safety: Not on file   Stress: Not on file   Social Connections: Not on file       Functional Status:  Prior to admission patient needed assistance:   Dependent ADLs:: Independent  Dependent IADLs:: Independent       Mental Health Status:  Mental Health Status: No Current Concerns       Chemical Dependency Status:  Chemical Dependency Status: No Current Concerns             Values/Beliefs:  Spiritual, Cultural Beliefs, Christian Practices, Values that affect care: no               Additional Information:  Initial consult done. Patient lives independently with spouse and daughter, open to [facility] RN services through [facility], anticipate resumption. Patient and spouse requested an application for Handicap Permit, provided paper application form to patient.   Case management will follow along.    Aliza Ackerman RN   [facility]   Phone [phone] or [phone]

## 2023-12-20 NOTE — PLAN OF CARE
Date: 12/20 [id number]  Surgery/POD#: Acute Anemia  Behavior & Aggression: Green  Fall Risk: Yes  Orientation: A&Ox4  ABNL VS/O2: VSS on RA  ABNL Labs: Hgb 8.7, Na 134, /163/243  Pain Management: Denies pain  Bowel/Bladder: Continent B&B. X1 BM last night (brown, loose). Abdomen midline incision from prior gyn onc procedure. (Steri strips intact).   Drains:X1PIV SL  Diet: Regular diet, plan for NPO at midnight   Activity Level: Ax1/SBA GBW  Tests/Procedures: Plan for SMA stent procedure with Vascular tomorrow. (Around noon)  Anticipated  DC Date: CMS intact. Noted sometimes feels dizzy when getting up. Continent B/B. PIV SL.

## 2023-12-20 NOTE — PROGRESS NOTES
[facility]    Internal Medicine Hospitalist Progress Note  12/20/2023  I evaluated patient on the above date.    Gus Solorzano Jr., MD  [phone] (p)  Text Page  Vocera        Assessment & Plan New actions/orders today (12/20/2023) are underlined. All lab results in the assessment and plan were reviewed.    Tammy Osorio is a 78 year old female with past medical history significant for HTN; DM2; and left adnexal mass s/p ex-lap, bilateral salpingo-oophorectomy and removal of mass (11/27/2023); who presented 12/18/2023 to the ED from her primary care clinic for evaluation of acute severe anemia.    On initial evaluation, was afebrile, /26; HR 90. Labs notable for CBC with WBC 11.8, hgb 4.9, plts normal; BMP with sodium 129, chloride 92; trop 46.     CT chest w/o contrast showed no inflammatory infiltrates; mild scattered noncalcified/indeterminate pulmonary nodules with the largest seen measuring 4.9 mm in the left lower lobe; mild left pleural effusion which appeared dependent in nature; heavily calcified thoracic aortic aneurysm with the ascending thoracic aorta measuring approximately 6.2 cm in greatest radial dimension and the descending thoracic aorta 4.2 cm.     CTA abdomen/pelvis with contrast showed trace abdominopelvic ascites, no large hematoma or evidence of active bleeding within the abdomen or pelvis; nearly occlusive left gonadal vein thrombus; tiny inferoanterior abdominal wall fluid collection (1.4 cm), likely seroma; infrarenal abdominal aortic aneurysm (4.6 cm) with anterior mural thrombus; severe celiac artery origin narrowing; small left pleural effusion.      Duodenal ulcers, suspect ischemic.  Acute normocytic anemia, suspect GI bleeding due to above.  * On 12/18, pt seen in follow-up by her primary doctor with complaints of light-headedness and weakness. She was found to have a hemoglobin of 5.0 so was sent to the ED. Notably, she did have ex-lap with BSO  and adenexal mass removal on 11/27 (see below). This was complicated by incisional hematoma, but at the time of discharge (12/1) her hemoglobin was stable at 12.1. Appears was started on ASA for DVT prophylaxis.  * Initial presentation as above. Pt denied any hematochezia, melena, hematemesis,  hematuria, or abnormal uterine bleeding.  Transfused 2U prbc's in ED. Iron studies showed iron deficiency component; retic cound elevated; TSH normal; LDH normal; B12, haptoglobin pending. Started on IV pantoprazole on admit. GI consulted on admit.  * On 12/19, occult blood positive. EGD showed extrinsic compression in the middle third of the esophagus; normal stomach; normal mucosa was found in the entire esophagus; non-bleeding duodenal ulcers with no stigmata of bleeding, biopsied. Biopsy results pending. 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 Vascular Surgery consulted and recommended SMA stent.  Recent Labs   Lab 12/20/23  0742 12/19/23  0850 12/19/23  0316 12/18/23  1559   HGB 8.7* 8.3* 6.8* 4.9*   - Plan SMA stenting, possibly today 12/20 per Vascular Surgery.  - Continue IV pantoprazole BID.  - Continue to monitor CBC - repeat in am.  - Consider prbc transfusion if hgb </= 7.0 or if significant bleeding with hemodynamic instability or if symptomatic.  - Appreciate GI and Vascular Surgery help.    Aortic aneurysms.  PAD with severe celiac origin narrowing, suspect contributing to duodenal ulcers.  * Per previous H&P: 5.5 cm on 1/2014 CT scan.  * Initial presentation and imaging as above. Chest CT showed heavily calcified thoracic aortic aneurysm with the ascending thoracic aorta measuring approximately 6.2 cm in greatest radial dimension and the descending thoracic aorta 4.2 cm. Abdominal CT showed infrarenal abdominal aortic aneurysm (4.6 cm) with anterior mural thrombus; severe celiac artery  "origin narrowing.  * On 12/19, as above, Vascular Surgery consulted and recommended SMA stent.  - Plan SMA stenting as above.  - Continue metoprolol.  - Strongly recommend tobacco cessation.  - Follow-up with Dr. Maya (CV Surgeon, [facility]) as per routine.  - Appreciate Vascular Surgery help.    Left adnexal mass s/p ex lap, bilateral salpingo-oophorectomy, and pelvic washings with removal of mass (11/27/2023).  Nearly occlusive left gonadal vein thrombus, suspect related to above.   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  * Initial presentation as above. Gynecology/oncology consulted  * On 12/19, per Gyn-Onc, \"Finding of nearly occlusive left gonadal vein thrombus is not surprising given recent left oophorectomy and we tie off this vessel to remove specimen. No action needed for that finding\"  - Continue Continue PRN acetaminophen and PTA PRN tramadol for pain control; minimize opioids as able.  - Appreciate Gyn-Onc help.    Hyponatremia, suspect SIADH.  * Pt had issues with hyponatremia during her last admission as well which was suspected to be 2/2 SIADH. Sodium at discharge was 130.   * Sodium was 129 on admission.   * Sodium normalized 12/19.  Recent Labs   Lab 12/19/23  0850 12/18/23  1559    129*   - Continue 1800 ml fluid restriction  - Continue to monitor sodium     Concern for new onset atrial fibrillation with RVR but suspect sinus tachycardia.  * EKG in the ED notable for atrial fibrillation. Pt has no prior history of this. Suspect this was triggered by severe anemia.   * On 12/19, reviewed ECG, appears to be sinus tachycardia with frequent PAC's.  * On 12/20, ECG showed sinus with PAC's. Echo showed LVEF 55%; RV OK; moderate AR, no AS; ascending aorta 5.6 cm; (noted that echo from 1/2023 showed LVEF 60%; 4+ AI; mild AS; aorta 5.7 cm).  - Continue to monitor on telemetry.  - Plan discharge on Holter.    Troponin elevation, suspect demand ischemia (type 2 " "NSTEMI).  * Initial presentation as above. Trop 46 on admit. Per reports, had nuclear stress study 11/1/2023 that was negative for ischemia.  * Trops subsequently \"flat\".  * Echo 12/20 as above.  Recent Labs   Lab 12/18/23  2252 12/18/23  1559   CTROPT 46* 46*   - Continue to treat other issues as noted.    Multiple pulmonary nodules.  COPD.  Tobacco dependence.  * Initial presentation as above. Pulmonary nodules noted on CT - mild scattered non-calcified/indeterminate pulmonary nodules with the largest seen measuring 4.9mm in the LLL.   - Recommend follow-up per PCP for pulmonary nodule, high risk given smoking history.  - Pt declined nicotine replacement.  - Strongly recommend tobacco cessation.     DM type II.  [PTA: metformin XR 1000 mg BID with meals.]  * Blood sugars in the 300s on admission. No recent A1C (was 7.1 in 2020).  Recent Labs   Lab 12/20/23  0616 12/20/23  0104 12/19/23  2126 12/19/23  1803 12/19/23  1358 12/19/23  0850   * 154* 162* 182* 142* 178*      No lab results found.   - Moderate CHO diet when diet advanced.  - Continue aspart ISS (medium).  - Continue to hold PTA metformin for now.    Hypertension (benign essential).  [PTA: amlodipine 10 mg daily; lisinopril 20 mg daily; metoprolol 50 mg BID.]  - Continue PTA amlodipine, lisinopril, and metoprolol.         Clinically Significant Risk Factors         # Hyponatremia: Lowest Na = 129 mmol/L in last 2 days, will monitor as appropriate      # Hypoalbuminemia: Lowest albumin = 3.4 g/dL at 12/18/2023  3:59 PM, will monitor as appropriate     # Hypertension: Noted on problem list        # Cachexia: Estimated body mass index is 16.37 kg/m  as calculated from the following:    Height as of this encounter: 1.676 m (5' 6\").    Weight as of this encounter: 46 kg (101 lb 6.4 oz)., PRESENT ON ADMISSION       # Financial/Environmental Concerns:             COVID-19 testing.  COVID-19 PCR Results           No data to display              COVID-19 " "Antibody Results, Testing for Immunity           No data to display                Diet: Fluid restriction 1800 ML FLUID  NPO per Anesthesia Guidelines for Procedure/Surgery Except for: Meds    Prophylaxis: PCD's, ambulation.   Andres Catheter: Not present  Lines: None     Code Status: Full Code    Disposition Plan   Expected discharge: 1-2d recommended to prior living arrangement pending  above .  Entered: Gus Solorzano MD 12/20/2023, 7:59 AM     Communication.  - I d/w pt's  12/20.      Interval History   Has some left chest wall pain.  Had some dizziness this am while up.  Otherwise, doing OK, no chest pain.    -Data reviewed today: I reviewed all new labs and imaging over the last 24 hours. I personally reviewed the EKG tracing showing sinus rhythm with PAC's .    Physical Exam    , Blood pressure 130/50, pulse 74, temperature 98  F (36.7  C), temperature source Oral, resp. rate 16, height 1.676 m (5' 6\"), weight 46 kg (101 lb 6.4 oz), SpO2 98%. O2 Device: None (Room air)    Vitals:    12/18/23 1554 12/19/23 0106   Weight: 49.9 kg (110 lb) 46 kg (101 lb 6.4 oz)     Vital Signs with Ranges  Temp:  [97.8  F (36.6  C)-98  F (36.7  C)] 98  F (36.7  C)  Pulse:  [70-76] 74  Resp:  [16-54] 16  BP: (130-142)/(48-55) 130/50  SpO2:  [94 %-98 %] 98 %  Patient Vitals for the past 24 hrs:   BP Temp Temp src Pulse Resp SpO2   12/20/23 0028 130/50 98  F (36.7  C) Oral 74 16 98 %   12/19/23 1521 132/48 97.8  F (36.6  C) Oral 76 16 97 %   12/19/23 1228 137/53 -- -- 70 17 94 %   12/19/23 1218 137/49 -- -- 70 (!) 35 97 %   12/19/23 1208 136/55 -- -- 72 (!) 54 96 %   12/19/23 1144 (!) 142/53 -- -- 70 18 --     I/O's Last 24 hours  I/O last 3 completed shifts:  In: 300   Out: -     Constitutional: Awake, alert, oriented, pleasant, conversant.  Respiratory: Diminished in bases. No crackles or wheezes.  Cardiovascular: RRR, no m/r/g.  Chest:  Tender left side/ribs.  GI: Soft, nt, nd, +BS.  Skin/Integumen:   Other:  " "      Data    Labs reviewed.  Recent Labs   Lab 12/20/23  0742 12/20/23  0616 12/20/23  0104 12/19/23  2126 12/19/23  1358 12/19/23  0850 12/19/23  0635 12/19/23  0316 12/18/23  2157 12/18/23  1559   WBC 7.5  --   --   --   --  9.7  --   --   --  11.8*   HGB 8.7*  --   --   --   --  8.3*  --  6.8*  --  4.9*   MCV 92  --   --   --   --  91  --   --   --  96     --   --   --   --  251  --   --   --  356   NA  --   --   --   --   --  135  --   --   --  129*   POTASSIUM  --   --   --   --   --  4.0  --   --   --  4.5   CHLORIDE  --   --   --   --   --  101  --   --   --  92*   CO2  --   --   --   --   --  26  --   --   --  27   BUN  --   --   --   --   --  15.4  --   --   --  20.6   CR  --   --   --   --   --  0.57  --   --   --  0.70   ANIONGAP  --   --   --   --   --  8  --   --   --  10   ARCHANA  --   --   --   --   --  8.5*  --   --   --  9.1   GLC  --  147* 154* 162*   < > 178*   < >  --    < > 306*   ALBUMIN  --   --   --   --   --   --   --   --   --  3.4*   PROTTOTAL  --   --   --   --   --   --   --   --   --  6.0*   BILITOTAL  --   --   --   --   --   --   --   --   --  0.5   ALKPHOS  --   --   --   --   --   --   --   --   --  90   ALT  --   --   --   --   --   --   --   --   --  11   AST  --   --   --   --   --   --   --   --   --  21    < > = values in this interval not displayed.     Recent Labs   Lab Test 12/18/23  2252 12/18/23  1559   TROPONIN T HIGH SENSITIVITY 46* 46*     Recent Labs   Lab 12/20/23  0616 12/20/23  0104 12/19/23  2126 12/19/23  1803 12/19/23  1358 12/19/23  0850   * 154* 162* 182* 142* 178*      No lab results found.     No results for input(s): \"INR\", \"[id number]\" in the last 168 hours.  Recent Labs   Lab 12/20/23  0742 12/19/23  0850 12/18/23  2252 12/18/23  1559   WBC 7.5 9.7  --  11.8*   LDH  --   --  153  --    DAVID  --   --   --  125       MICRO:  CULTURES (INCLUDING BLOOD AND URINE):  No lab results found in last 7 days.    Recent Results (from the past 24 hour(s)) "   Echocardiogram Complete   Result Value    LVEF  55%    [facility]    [id number]  [id number]  [id number]  [id number]^MIKAEL^DOMINIC^NANCY     [facility]  Echocardiography Laboratory  [address] [place] [address]     Name: LORI PETIT  MRN: [id number]  : 1945  Study Date: 2023 03:40 PM  Age: 78 yrs  Gender: Female  Patient Location: [facility]  Reason For Study: Atrial Fibrillation  Ordering Physician: DOMINIC YOUSSEF  Performed By: Gurmeet Hill     BSA: 1.5 m2  Height: 66 in  Weight: 101 lb  HR: 76  BP: 148/63 mmHg  ______________________________________________________________________________  Procedure  Complete Echo Adult. Optison (NDC #8605-2032) given intravenously.  ______________________________________________________________________________  Interpretation Summary     1. The left ventricle is normal in size. The visual ejection fraction is  estimated at 55%.  2. The right ventricle is normal in structure, function and size.  3. There is moderate (2+) aortic regurgitation.  4. Ascending aorta dilatation is present. 5.6cm.     Echo 2023 from [facility]: EF 60%, 4+ AI, mild AS, aorta 5.7cm.  ______________________________________________________________________________  Left Ventricle  The left ventricle is normal in size. There is normal left ventricular wall  thickness. The visual ejection fraction is estimated at 55%. Grade I or early  diastolic dysfunction. Normal left ventricular wall motion.     Right Ventricle  The right ventricle is normal in structure, function and size.     Atria  The left atrium is mildly dilated. The right atrium is mild to moderately  dilated. There is no atrial shunt seen.     Mitral Valve  There is no mitral regurgitation noted.     Tricuspid Valve  There is mild (1+) tricuspid regurgitation.     Aortic Valve  There is mild trileaflet aortic sclerosis. There is moderate (2+) aortic  regurgitation. No aortic stenosis is present.      Pulmonic Valve  The pulmonic valve is normal in structure and function.     Vessels  Ascending aorta dilatation is present. Dilation of the inferior vena cava is  present with abnormal respiratory variation in diameter.     Pericardium  There is no pericardial effusion.     Rhythm  Sinus rhythm was noted.  ______________________________________________________________________________  MMode/2D Measurements & Calculations     IVSd: 0.90 cm  LVIDd: 4.6 cm  LVIDs: 3.6 cm  LVPWd: 0.84 cm  FS: 21.1 %  LV mass(C)d: 131.8 grams  LV mass(C)dI: 88.1 grams/m2  Ao root diam: 3.5 cm  LA dimension: 3.3 cm  asc Aorta Diam: 5.6 cm  LA/Ao: 0.95  LVOT diam: 2.0 cm  LVOT area: 3.2 cm2  Ao root diam index Ht(cm/m): 2.1  Ao root diam index BSA (cm/m2): 2.3  Asc Ao diam index BSA (cm/m2): 3.8  Asc Ao diam index Ht(cm/m): 3.4  RV Base: 3.4 cm  RWT: 0.37  TAPSE: 2.2 cm     Doppler Measurements & Calculations  MV E max arnulfo: 125.0 cm/sec  MV A max arnulfo: 66.3 cm/sec  MV E/A: 1.9  MV dec time: 0.16 sec  Ao V2 max: 250.0 cm/sec  Ao max P.0 mmHg  Ao V2 mean: 173.0 cm/sec  Ao mean P.0 mmHg  Ao V2 VTI: 42.6 cm  CY(I,D): 2.1 cm2  CY(V,D): 1.8 cm2  AI P1/2t: 457.4 msec  LV V1 max P.0 mmHg  LV V1 max: 141.0 cm/sec  LV V1 VTI: 28.1 cm  SV(LVOT): 90.0 ml  SI(LVOT): 60.2 ml/m2  PA acc time: 0.14 sec  TR max arnulfo: 286.3 cm/sec  TR max P.9 mmHg  AV Arnulfo Ratio (DI): 0.56  CY Index (cm2/m2): 1.4  E/E' av.9  Lateral E/e': 15.7  Medial E/e': 28.0  RV S Arnulfo: 16.4 cm/sec     ______________________________________________________________________________  Report approved by: Farideh Alvarado 2023 04:43 PM             Medications   All medications were reviewed.    Infusions:   sodium chloride 75 mL/hr at 23 0015     Scheduled Medications:   amLODIPine  10 mg Oral Daily    insulin aspart  1-6 Units Subcutaneous Q4H    lisinopril  20 mg Oral QPM    metFORMIN  1,000 mg Oral BID w/meals    metoprolol tartrate  50 mg Oral  Daily    pantoprazole  40 mg Intravenous BID    sertraline  50 mg Oral QPM    sodium chloride (PF)  3 mL Intracatheter Q8H     PRN Medications:  acetaminophen **OR** acetaminophen, calcium carbonate, glucose **OR** dextrose **OR** glucagon, lidocaine 4%, lidocaine (buffered or not buffered), metoprolol, naloxone **OR** naloxone **OR** naloxone **OR** naloxone, senna-docusate **OR** senna-docusate, sodium chloride (PF), traMADol

## 2023-12-20 NOTE — PROGRESS NOTES
[facility]  Gastroenterology Progress Note     Tammy Osorio MRN# [id number]   YOB: 1945 Age: 78 year old          Assessment and Plan:     Tammy España is a 78 with PMHx of diabetes AAA, among others whom presented to PCP on 12/18/23 due to lightheadedness and found to have a low hemoglobin. Of note, she did undergo a exploratory laparotomy with bilateral salpingo-oophorectomy and adnexal mass excised on 11/27/23 with pathology revealing left mucinous cystadenoma associated with benign Javier tumor, this was complicated by an incisional hematoma but hemoglobin was 12.1 at time of discharge.      Anemia due to blood loss, acute  Lightheadeness/weakness  Hemoglobin 4.9->6.8> 8.7baseline 11-12  CTA A/P notes trace ascites, no large hematoma or evidence of active bleeding in abdomen or pelvis. Nearly occlusive left gonadal vein thrombus. Infrarenal AAA with anterior mural thrombus. Severe celiac artery narrowing  Recent exploratory laparotomy (11/27/23), BSO with hematoma post op  No prior EGD or colonoscopy   Risk factors for GI bleed include tobacco abuse and prior colorectal screening  12/18 EGD noted moderate extrinsic compression from AAA on esophagus. Many non bleeding cratered duodenal ulcers with no bleeding. Largest lesion was 20 mm- biopsied. Suspect ischemic cause vs possible lymphoma    - pantoprazole 40 mg BID  - Await pathology results   - Hemoglobin stable  - recommend mechanical soft diet  - appreciate vascular consult and planning SMA stenting on 12/22                  Interval History:     no new complaints and doing well; no cp, sob, n/v/d, or abd pain.              Review of Systems:     C: NEGATIVE for fever, chills, change in weight  E/M: NEGATIVE for ear, mouth and throat problems  R: NEGATIVE for significant cough or SOB  CV: NEGATIVE for chest pain, palpitations or peripheral edema             Medications:   I have reviewed this patient's current  medications   amLODIPine  10 mg Oral Daily    insulin aspart  1-6 Units Subcutaneous Q4H    lisinopril  20 mg Oral QPM    [Held by provider] metFORMIN  1,000 mg Oral BID w/meals    metoprolol tartrate  50 mg Oral Daily    pantoprazole  40 mg Intravenous BID    sertraline  50 mg Oral QPM    sodium chloride (PF)  3 mL Intracatheter Q8H                  Physical Exam:   Vitals were reviewed  Vital Signs with Ranges  Temp:  [97.8  F (36.6  C)-98.5  F (36.9  C)] 98.5  F (36.9  C)  Pulse:  [70-78] 78  Resp:  [16-54] 16  BP: (130-142)/(48-55) 138/50  SpO2:  [94 %-98 %] 96 %  I/O last 3 completed shifts:  In: 300   Out: -   Constitutional: healthy, alert, and no distress   Cardiovascular: negative, PMI normal. No lifts, heaves, or thrills. RRR. No murmurs, clicks gallops or rub  Respiratory: negative, Percussion normal. Good diaphragmatic excursion. Lungs clear  Abdomen: Abdomen soft, non-tender. BS normal. No masses, organomegaly           Data:   I reviewed the patient's new clinical lab test results.   Recent Labs   Lab Test 12/20/23  0742 12/19/23  0850 12/19/23  0316 12/18/23  1559   WBC 7.5 9.7  --  11.8*   HGB 8.7* 8.3* 6.8* 4.9*   MCV 92 91  --  96    251  --  356     Recent Labs   Lab Test 12/20/23  0742 12/19/23  0850 12/18/23  1559   POTASSIUM 4.4 4.0 4.5   CHLORIDE 102 101 92*   CO2 24 26 27   BUN 15.2 15.4 20.6   ANIONGAP 8 8 10     Recent Labs   Lab Test 12/18/23  1559   ALBUMIN 3.4*   BILITOTAL 0.5   ALT 11   AST 21       I reviewed the patient's new imaging results.    All laboratory data reviewed  All imaging studies reviewed by me.    Luz Soto PA-C,  12/20/2023  [facility]  Office : [phone]  Cell: [phone] (Dr. Ortega)  Cell: [phone] (Luz Soto PA-C)

## 2023-12-20 NOTE — PLAN OF CARE
Date & Time: 12/19/23-12/20/23 [id number]    Surgery/POD#: Here for acute anemia    Behavior & Aggression: GREEN  Fall Risk: yes  Orientation: A&Ox4  ABNL VS/O2: VSS on RA  ABNL Labs: hgb 8.3, occult blood positive  Pain Management: Denies  Bowel/Bladder: Continent of B/B, voids in the BR and no BM this shift  IV: PIV infusing NS 75 ml/hr  Diet: NPO at midnight  Activity Level: SBA w/ WK  Tests/Procedures: Plan for potential SMA stent placement  Anticipated DC Date: Pending improvement  Significant Information: [facility] CDI, Tele NSR

## 2023-12-21 ENCOUNTER — ANESTHESIA (OUTPATIENT)
Dept: SURGERY | Facility: CLINIC | Age: 78
DRG: 356 | End: 2023-12-21
Payer: MEDICARE

## 2023-12-21 ENCOUNTER — APPOINTMENT (OUTPATIENT)
Dept: SURGERY | Facility: PHYSICIAN GROUP | Age: 78
End: 2023-12-21
Payer: MEDICARE

## 2023-12-21 ENCOUNTER — ANESTHESIA EVENT (OUTPATIENT)
Dept: SURGERY | Facility: CLINIC | Age: 78
DRG: 356 | End: 2023-12-21
Payer: MEDICARE

## 2023-12-21 ENCOUNTER — APPOINTMENT (OUTPATIENT)
Dept: INTERVENTIONAL RADIOLOGY/VASCULAR | Facility: CLINIC | Age: 78
DRG: 356 | End: 2023-12-21
Attending: STUDENT IN AN ORGANIZED HEALTH CARE EDUCATION/TRAINING PROGRAM
Payer: MEDICARE

## 2023-12-21 LAB
ABO/RH(D): NORMAL
ANION GAP SERPL CALCULATED.3IONS-SCNC: 6 MMOL/L (ref 7–15)
ANTIBODY SCREEN: NEGATIVE
BASOPHILS # BLD AUTO: 0 10E3/UL (ref 0–0.2)
BASOPHILS NFR BLD AUTO: 0 %
BUN SERPL-MCNC: 10 MG/DL (ref 8–23)
CALCIUM SERPL-MCNC: 8.2 MG/DL (ref 8.8–10.2)
CHLORIDE SERPL-SCNC: 101 MMOL/L (ref 98–107)
CREAT SERPL-MCNC: 0.59 MG/DL (ref 0.51–0.95)
DEPRECATED HCO3 PLAS-SCNC: 27 MMOL/L (ref 22–29)
EGFRCR SERPLBLD CKD-EPI 2021: >90 ML/MIN/1.73M2
EOSINOPHIL # BLD AUTO: 0.1 10E3/UL (ref 0–0.7)
EOSINOPHIL NFR BLD AUTO: 1 %
ERYTHROCYTE [DISTWIDTH] IN BLOOD BY AUTOMATED COUNT: 15.1 % (ref 10–15)
GLIADIN IGA SER-ACNC: 1 U/ML
GLIADIN IGG SER-ACNC: <0.6 U/ML
GLUCOSE BLDC GLUCOMTR-MCNC: 159 MG/DL (ref 70–99)
GLUCOSE BLDC GLUCOMTR-MCNC: 159 MG/DL (ref 70–99)
GLUCOSE BLDC GLUCOMTR-MCNC: 163 MG/DL (ref 70–99)
GLUCOSE BLDC GLUCOMTR-MCNC: 164 MG/DL (ref 70–99)
GLUCOSE BLDC GLUCOMTR-MCNC: 168 MG/DL (ref 70–99)
GLUCOSE BLDC GLUCOMTR-MCNC: 273 MG/DL (ref 70–99)
GLUCOSE SERPL-MCNC: 161 MG/DL (ref 70–99)
HCT VFR BLD AUTO: 26.2 % (ref 35–47)
HGB BLD-MCNC: 8.2 G/DL (ref 11.7–15.7)
IGA SERPL-MCNC: 131 MG/DL (ref 84–499)
IMM GRANULOCYTES # BLD: 0.1 10E3/UL
IMM GRANULOCYTES NFR BLD: 1 %
LYMPHOCYTES # BLD AUTO: 0.7 10E3/UL (ref 0.8–5.3)
LYMPHOCYTES NFR BLD AUTO: 10 %
MCH RBC QN AUTO: 28.8 PG (ref 26.5–33)
MCHC RBC AUTO-ENTMCNC: 31.3 G/DL (ref 31.5–36.5)
MCV RBC AUTO: 92 FL (ref 78–100)
MONOCYTES # BLD AUTO: 0.8 10E3/UL (ref 0–1.3)
MONOCYTES NFR BLD AUTO: 11 %
NEUTROPHILS # BLD AUTO: 5.5 10E3/UL (ref 1.6–8.3)
NEUTROPHILS NFR BLD AUTO: 77 %
NRBC # BLD AUTO: 0 10E3/UL
NRBC BLD AUTO-RTO: 0 /100
PATH REPORT.COMMENTS IMP SPEC: NORMAL
PATH REPORT.COMMENTS IMP SPEC: NORMAL
PATH REPORT.FINAL DX SPEC: NORMAL
PATH REPORT.GROSS SPEC: NORMAL
PATH REPORT.MICROSCOPIC SPEC OTHER STN: NORMAL
PATH REPORT.RELEVANT HX SPEC: NORMAL
PHOTO IMAGE: NORMAL
PLATELET # BLD AUTO: 253 10E3/UL (ref 150–450)
POTASSIUM SERPL-SCNC: 3.5 MMOL/L (ref 3.4–5.3)
RBC # BLD AUTO: 2.85 10E6/UL (ref 3.8–5.2)
SODIUM SERPL-SCNC: 134 MMOL/L (ref 135–145)
SPECIMEN EXPIRATION DATE: NORMAL
TTG IGA SER-ACNC: 0.4 U/ML
TTG IGG SER-ACNC: <0.6 U/ML
WBC # BLD AUTO: 7.1 10E3/UL (ref 4–11)

## 2023-12-21 PROCEDURE — 250N000011 HC RX IP 250 OP 636: Performed by: SURGERY

## 2023-12-21 PROCEDURE — 04753DZ DILATION OF SUPERIOR MESENTERIC ARTERY WITH INTRALUMINAL DEVICE, PERCUTANEOUS APPROACH: ICD-10-PCS | Performed by: SURGERY

## 2023-12-21 PROCEDURE — 258N000003 HC RX IP 258 OP 636: Performed by: NURSE ANESTHETIST, CERTIFIED REGISTERED

## 2023-12-21 PROCEDURE — C9113 INJ PANTOPRAZOLE SODIUM, VIA: HCPCS | Performed by: SURGERY

## 2023-12-21 PROCEDURE — C1769 GUIDE WIRE: HCPCS | Performed by: SURGERY

## 2023-12-21 PROCEDURE — 999N000083 IR OR ANGIOGRAM

## 2023-12-21 PROCEDURE — 120N000001 HC R&B MED SURG/OB

## 2023-12-21 PROCEDURE — C1887 CATHETER, GUIDING: HCPCS | Performed by: SURGERY

## 2023-12-21 PROCEDURE — 250N000013 HC RX MED GY IP 250 OP 250 PS 637: Performed by: STUDENT IN AN ORGANIZED HEALTH CARE EDUCATION/TRAINING PROGRAM

## 2023-12-21 PROCEDURE — 36245 INS CATH ABD/L-EXT ART 1ST: CPT | Mod: GC | Performed by: SURGERY

## 2023-12-21 PROCEDURE — 360N000075 HC SURGERY LEVEL 2, PER MIN: Performed by: SURGERY

## 2023-12-21 PROCEDURE — 250N000013 HC RX MED GY IP 250 OP 250 PS 637: Performed by: SURGERY

## 2023-12-21 PROCEDURE — 88305 TISSUE EXAM BY PATHOLOGIST: CPT | Mod: 26 | Performed by: PATHOLOGY

## 2023-12-21 PROCEDURE — 37236 OPEN/PERQ PLACE STENT 1ST: CPT | Mod: GC | Performed by: SURGERY

## 2023-12-21 PROCEDURE — 250N000025 HC SEVOFLURANE, PER MIN: Performed by: SURGERY

## 2023-12-21 PROCEDURE — 36415 COLL VENOUS BLD VENIPUNCTURE: CPT | Performed by: INTERNAL MEDICINE

## 2023-12-21 PROCEDURE — 250N000011 HC RX IP 250 OP 636: Performed by: NURSE ANESTHETIST, CERTIFIED REGISTERED

## 2023-12-21 PROCEDURE — 272N000001 HC OR GENERAL SUPPLY STERILE: Performed by: SURGERY

## 2023-12-21 PROCEDURE — 370N000017 HC ANESTHESIA TECHNICAL FEE, PER MIN: Performed by: SURGERY

## 2023-12-21 PROCEDURE — 258N000003 HC RX IP 258 OP 636: Performed by: ANESTHESIOLOGY

## 2023-12-21 PROCEDURE — C9113 INJ PANTOPRAZOLE SODIUM, VIA: HCPCS | Performed by: STUDENT IN AN ORGANIZED HEALTH CARE EDUCATION/TRAINING PROGRAM

## 2023-12-21 PROCEDURE — 250N000009 HC RX 250: Performed by: SURGERY

## 2023-12-21 PROCEDURE — C1894 INTRO/SHEATH, NON-LASER: HCPCS | Performed by: SURGERY

## 2023-12-21 PROCEDURE — 999N000141 HC STATISTIC PRE-PROCEDURE NURSING ASSESSMENT: Performed by: SURGERY

## 2023-12-21 PROCEDURE — 710N000009 HC RECOVERY PHASE 1, LEVEL 1, PER MIN: Performed by: SURGERY

## 2023-12-21 PROCEDURE — 80048 BASIC METABOLIC PNL TOTAL CA: CPT | Performed by: INTERNAL MEDICINE

## 2023-12-21 PROCEDURE — 99232 SBSQ HOSP IP/OBS MODERATE 35: CPT | Performed by: INTERNAL MEDICINE

## 2023-12-21 PROCEDURE — 258N000003 HC RX IP 258 OP 636: Performed by: INTERNAL MEDICINE

## 2023-12-21 PROCEDURE — 86900 BLOOD TYPING SEROLOGIC ABO: CPT | Performed by: SURGERY

## 2023-12-21 PROCEDURE — 250N000011 HC RX IP 250 OP 636: Performed by: STUDENT IN AN ORGANIZED HEALTH CARE EDUCATION/TRAINING PROGRAM

## 2023-12-21 PROCEDURE — 85025 COMPLETE CBC W/AUTO DIFF WBC: CPT | Performed by: INTERNAL MEDICINE

## 2023-12-21 PROCEDURE — 250N000009 HC RX 250: Performed by: NURSE ANESTHETIST, CERTIFIED REGISTERED

## 2023-12-21 DEVICE — IMPLANTABLE DEVICE: Type: IMPLANTABLE DEVICE | Site: ABDOMEN | Status: FUNCTIONAL

## 2023-12-21 RX ORDER — CEFAZOLIN SODIUM/WATER 2 G/20 ML
2 SYRINGE (ML) INTRAVENOUS
Status: COMPLETED | OUTPATIENT
Start: 2023-12-21 | End: 2023-12-21

## 2023-12-21 RX ORDER — SODIUM CHLORIDE 9 MG/ML
INJECTION, SOLUTION INTRAVENOUS CONTINUOUS
Status: ACTIVE | OUTPATIENT
Start: 2023-12-21 | End: 2023-12-21

## 2023-12-21 RX ORDER — HYDROMORPHONE HCL IN WATER/PF 6 MG/30 ML
0.4 PATIENT CONTROLLED ANALGESIA SYRINGE INTRAVENOUS EVERY 5 MIN PRN
Status: DISCONTINUED | OUTPATIENT
Start: 2023-12-21 | End: 2023-12-21 | Stop reason: HOSPADM

## 2023-12-21 RX ORDER — MAGNESIUM HYDROXIDE 1200 MG/15ML
LIQUID ORAL PRN
Status: DISCONTINUED | OUTPATIENT
Start: 2023-12-21 | End: 2023-12-21 | Stop reason: HOSPADM

## 2023-12-21 RX ORDER — SODIUM CHLORIDE, SODIUM LACTATE, POTASSIUM CHLORIDE, CALCIUM CHLORIDE 600; 310; 30; 20 MG/100ML; MG/100ML; MG/100ML; MG/100ML
INJECTION, SOLUTION INTRAVENOUS CONTINUOUS
Status: DISCONTINUED | OUTPATIENT
Start: 2023-12-21 | End: 2023-12-21 | Stop reason: HOSPADM

## 2023-12-21 RX ORDER — HEPARIN SODIUM 1000 [USP'U]/ML
INJECTION, SOLUTION INTRAVENOUS; SUBCUTANEOUS PRN
Status: DISCONTINUED | OUTPATIENT
Start: 2023-12-21 | End: 2023-12-21

## 2023-12-21 RX ORDER — PROPOFOL 10 MG/ML
INJECTION, EMULSION INTRAVENOUS PRN
Status: DISCONTINUED | OUTPATIENT
Start: 2023-12-21 | End: 2023-12-21

## 2023-12-21 RX ORDER — ONDANSETRON 2 MG/ML
4 INJECTION INTRAMUSCULAR; INTRAVENOUS EVERY 6 HOURS PRN
Status: DISCONTINUED | OUTPATIENT
Start: 2023-12-21 | End: 2023-12-22 | Stop reason: HOSPADM

## 2023-12-21 RX ORDER — ONDANSETRON 4 MG/1
4 TABLET, ORALLY DISINTEGRATING ORAL EVERY 30 MIN PRN
Status: DISCONTINUED | OUTPATIENT
Start: 2023-12-21 | End: 2023-12-21 | Stop reason: HOSPADM

## 2023-12-21 RX ORDER — OXYCODONE HYDROCHLORIDE 5 MG/1
5 TABLET ORAL EVERY 4 HOURS PRN
Status: DISCONTINUED | OUTPATIENT
Start: 2023-12-21 | End: 2023-12-22 | Stop reason: HOSPADM

## 2023-12-21 RX ORDER — BUPIVACAINE HYDROCHLORIDE 5 MG/ML
INJECTION, SOLUTION PERINEURAL PRN
Status: DISCONTINUED | OUTPATIENT
Start: 2023-12-21 | End: 2023-12-22 | Stop reason: HOSPADM

## 2023-12-21 RX ORDER — FENTANYL CITRATE 0.05 MG/ML
25 INJECTION, SOLUTION INTRAMUSCULAR; INTRAVENOUS EVERY 5 MIN PRN
Status: DISCONTINUED | OUTPATIENT
Start: 2023-12-21 | End: 2023-12-21 | Stop reason: HOSPADM

## 2023-12-21 RX ORDER — CLOPIDOGREL BISULFATE 75 MG/1
75 TABLET ORAL DAILY
Status: DISCONTINUED | OUTPATIENT
Start: 2023-12-21 | End: 2023-12-22 | Stop reason: HOSPADM

## 2023-12-21 RX ORDER — NEOSTIGMINE METHYLSULFATE 1 MG/ML
VIAL (ML) INJECTION PRN
Status: DISCONTINUED | OUTPATIENT
Start: 2023-12-21 | End: 2023-12-21

## 2023-12-21 RX ORDER — DEXAMETHASONE SODIUM PHOSPHATE 4 MG/ML
INJECTION, SOLUTION INTRA-ARTICULAR; INTRALESIONAL; INTRAMUSCULAR; INTRAVENOUS; SOFT TISSUE PRN
Status: DISCONTINUED | OUTPATIENT
Start: 2023-12-21 | End: 2023-12-21

## 2023-12-21 RX ORDER — ASPIRIN 81 MG/1
81 TABLET ORAL DAILY
Status: DISCONTINUED | OUTPATIENT
Start: 2023-12-21 | End: 2023-12-22 | Stop reason: HOSPADM

## 2023-12-21 RX ORDER — FENTANYL CITRATE 50 UG/ML
INJECTION, SOLUTION INTRAMUSCULAR; INTRAVENOUS PRN
Status: DISCONTINUED | OUTPATIENT
Start: 2023-12-21 | End: 2023-12-21

## 2023-12-21 RX ORDER — ONDANSETRON 2 MG/ML
4 INJECTION INTRAMUSCULAR; INTRAVENOUS EVERY 30 MIN PRN
Status: DISCONTINUED | OUTPATIENT
Start: 2023-12-21 | End: 2023-12-21 | Stop reason: HOSPADM

## 2023-12-21 RX ORDER — LIDOCAINE HYDROCHLORIDE 20 MG/ML
INJECTION, SOLUTION INFILTRATION; PERINEURAL PRN
Status: DISCONTINUED | OUTPATIENT
Start: 2023-12-21 | End: 2023-12-21

## 2023-12-21 RX ORDER — GLYCOPYRROLATE 0.2 MG/ML
INJECTION, SOLUTION INTRAMUSCULAR; INTRAVENOUS PRN
Status: DISCONTINUED | OUTPATIENT
Start: 2023-12-21 | End: 2023-12-21

## 2023-12-21 RX ORDER — ONDANSETRON 2 MG/ML
INJECTION INTRAMUSCULAR; INTRAVENOUS PRN
Status: DISCONTINUED | OUTPATIENT
Start: 2023-12-21 | End: 2023-12-21

## 2023-12-21 RX ORDER — SODIUM CHLORIDE 9 MG/ML
INJECTION, SOLUTION INTRAVENOUS CONTINUOUS PRN
Status: DISCONTINUED | OUTPATIENT
Start: 2023-12-21 | End: 2023-12-21

## 2023-12-21 RX ORDER — HYDROMORPHONE HCL IN WATER/PF 6 MG/30 ML
0.2 PATIENT CONTROLLED ANALGESIA SYRINGE INTRAVENOUS EVERY 5 MIN PRN
Status: DISCONTINUED | OUTPATIENT
Start: 2023-12-21 | End: 2023-12-21 | Stop reason: HOSPADM

## 2023-12-21 RX ORDER — FENTANYL CITRATE 0.05 MG/ML
50 INJECTION, SOLUTION INTRAMUSCULAR; INTRAVENOUS EVERY 5 MIN PRN
Status: DISCONTINUED | OUTPATIENT
Start: 2023-12-21 | End: 2023-12-21 | Stop reason: HOSPADM

## 2023-12-21 RX ORDER — ATORVASTATIN CALCIUM 40 MG/1
40 TABLET, FILM COATED ORAL EVERY EVENING
Status: DISCONTINUED | OUTPATIENT
Start: 2023-12-21 | End: 2023-12-22 | Stop reason: HOSPADM

## 2023-12-21 RX ADMIN — INSULIN ASPART 1 UNITS: 100 INJECTION, SOLUTION INTRAVENOUS; SUBCUTANEOUS at 17:54

## 2023-12-21 RX ADMIN — INSULIN ASPART 1 UNITS: 100 INJECTION, SOLUTION INTRAVENOUS; SUBCUTANEOUS at 02:25

## 2023-12-21 RX ADMIN — FENTANYL CITRATE 50 MCG: 50 INJECTION INTRAMUSCULAR; INTRAVENOUS at 13:56

## 2023-12-21 RX ADMIN — ROCURONIUM BROMIDE 40 MG: 50 INJECTION, SOLUTION INTRAVENOUS at 13:18

## 2023-12-21 RX ADMIN — CLOPIDOGREL BISULFATE 75 MG: 75 TABLET ORAL at 17:54

## 2023-12-21 RX ADMIN — SERTRALINE HYDROCHLORIDE 50 MG: 50 TABLET ORAL at 19:52

## 2023-12-21 RX ADMIN — ASPIRIN 81 MG: 81 TABLET, COATED ORAL at 17:54

## 2023-12-21 RX ADMIN — ATORVASTATIN CALCIUM 40 MG: 40 TABLET, FILM COATED ORAL at 19:51

## 2023-12-21 RX ADMIN — PROPOFOL 100 MG: 10 INJECTION, EMULSION INTRAVENOUS at 13:18

## 2023-12-21 RX ADMIN — INSULIN ASPART 1 UNITS: 100 INJECTION, SOLUTION INTRAVENOUS; SUBCUTANEOUS at 06:08

## 2023-12-21 RX ADMIN — LISINOPRIL 20 MG: 20 TABLET ORAL at 19:51

## 2023-12-21 RX ADMIN — FENTANYL CITRATE 50 MCG: 50 INJECTION INTRAMUSCULAR; INTRAVENOUS at 13:18

## 2023-12-21 RX ADMIN — NEOSTIGMINE METHYLSULFATE 2.5 MG: 1 INJECTION, SOLUTION INTRAVENOUS at 15:08

## 2023-12-21 RX ADMIN — METOPROLOL TARTRATE 50 MG: 50 TABLET, FILM COATED ORAL at 08:12

## 2023-12-21 RX ADMIN — GLYCOPYRROLATE 0.5 MG: 0.2 INJECTION, SOLUTION INTRAMUSCULAR; INTRAVENOUS at 15:08

## 2023-12-21 RX ADMIN — INSULIN ASPART 3 UNITS: 100 INJECTION, SOLUTION INTRAVENOUS; SUBCUTANEOUS at 22:04

## 2023-12-21 RX ADMIN — DEXAMETHASONE SODIUM PHOSPHATE 8 MG: 4 INJECTION, SOLUTION INTRA-ARTICULAR; INTRALESIONAL; INTRAMUSCULAR; INTRAVENOUS; SOFT TISSUE at 13:37

## 2023-12-21 RX ADMIN — SODIUM CHLORIDE, POTASSIUM CHLORIDE, SODIUM LACTATE AND CALCIUM CHLORIDE: 600; 310; 30; 20 INJECTION, SOLUTION INTRAVENOUS at 12:30

## 2023-12-21 RX ADMIN — PANTOPRAZOLE SODIUM 40 MG: 40 INJECTION, POWDER, FOR SOLUTION INTRAVENOUS at 08:12

## 2023-12-21 RX ADMIN — SODIUM CHLORIDE: 9 INJECTION, SOLUTION INTRAVENOUS at 00:08

## 2023-12-21 RX ADMIN — PHENYLEPHRINE HYDROCHLORIDE 0.5 MCG/KG/MIN: 10 INJECTION INTRAVENOUS at 13:36

## 2023-12-21 RX ADMIN — SODIUM CHLORIDE: 0.9 INJECTION, SOLUTION INTRAVENOUS at 13:24

## 2023-12-21 RX ADMIN — TRAMADOL HYDROCHLORIDE 25 MG: 50 TABLET, COATED ORAL at 19:53

## 2023-12-21 RX ADMIN — ONDANSETRON 4 MG: 2 INJECTION INTRAMUSCULAR; INTRAVENOUS at 15:06

## 2023-12-21 RX ADMIN — LIDOCAINE HYDROCHLORIDE 60 MG: 20 INJECTION, SOLUTION INFILTRATION; PERINEURAL at 13:18

## 2023-12-21 RX ADMIN — PHENYLEPHRINE HYDROCHLORIDE 100 MCG: 10 INJECTION INTRAVENOUS at 14:09

## 2023-12-21 RX ADMIN — PANTOPRAZOLE SODIUM 40 MG: 40 INJECTION, POWDER, FOR SOLUTION INTRAVENOUS at 19:51

## 2023-12-21 RX ADMIN — HEPARIN SODIUM 5000 UNITS: 1000 INJECTION, SOLUTION INTRAVENOUS; SUBCUTANEOUS at 13:58

## 2023-12-21 RX ADMIN — INSULIN ASPART 1 UNITS: 100 INJECTION, SOLUTION INTRAVENOUS; SUBCUTANEOUS at 11:02

## 2023-12-21 RX ADMIN — Medication 2 G: at 13:23

## 2023-12-21 ASSESSMENT — COPD QUESTIONNAIRES
CAT_SEVERITY: MODERATE
COPD: 1

## 2023-12-21 ASSESSMENT — ACTIVITIES OF DAILY LIVING (ADL)
ADLS_ACUITY_SCORE: 23
ADLS_ACUITY_SCORE: 23
ADLS_ACUITY_SCORE: 22
ADLS_ACUITY_SCORE: 23
ADLS_ACUITY_SCORE: 22
ADLS_ACUITY_SCORE: 23
ADLS_ACUITY_SCORE: 23
ADLS_ACUITY_SCORE: 22
ADLS_ACUITY_SCORE: 23
ADLS_ACUITY_SCORE: 22
ADLS_ACUITY_SCORE: 22
ADLS_ACUITY_SCORE: 23

## 2023-12-21 ASSESSMENT — LIFESTYLE VARIABLES: TOBACCO_USE: 1

## 2023-12-21 NOTE — OP NOTE
Vascular Surgery Operative Note     PREOPERATIVE DIAGNOSIS:  SMA stenosis; concern for chronic mesenteric ischemia with ischemic duodenum     POSTOPERATIVE DIAGNOSIS:  Same     PROCEDURE:   Left brachial artery cutdown and primary repair  2nd-order selective aortogram and superior mesenteric artery angiogram  Stenting of the superior mesenteric artery origin (with 8 x 27 mm stent)     SURGEON:  Natalia Mirza MD     ASSISTANT:  Linda Clark MD     ANESTHESIA:  General Endotracheal     ESTIMATED BLOOD LOSS:  25 mL    FLUORO TIME: 18.2 min  FLUORO DOSE: 233 mGy  CONTRAST: 75 mL    SPECIMENS: None      INDICATIONS:  Ms. Osorio is a 78F with history of active tobacco abuse, HTN, T2DM, and recent ex-lap with BSO for a mucionous cystadenoma. She presented with subacute anemia and endoscopy done for work-up demonstrated multiple duodenal ulcers felt to be potentially ischemic in nature. CT imaging demonstrated diffuse arterial disease, including celiac and inferior mesenteric artery origin occlusions and high-grade stenosis of the superior mesenteric artery. She was brought to the OR today to undergo SMA stenting.      INTRAOPERATIVE FINDINGS:    Calcific superior mesenteric artery stenosis. Stent successfully deployed.     DESCRIPTION OF TECHNIQUE:   Ms. Gonzales was brought into the operating room and transferred to the operating table in the supine position. After uneventful endotracheal intubation, general anesthesia was initiated. Antibiotics were administered. The left arm  was prepped and draped in standard sterile fashion. Timeout was performed and the entire surgical team was in agreement with the planned procedure and correctly marked side.      The brachial pulse was palpated. A longitudinal incision was created sharply over the brachial pulse in the distal arm and the subcutaneous tissue dissected with bovie electrocautery. The fascia was incised and the brachial artery sharply circumferentially dissected  and encircled proximally and distally with vessel loops. Systemic heparinization was given.     Under direct visualization, a needle was used to cannulate the artery and advance first a microsheath and then a 5-Fr sheath in standard Seldinger fashion. The 5-Fr sheath was used to introduce a 0.035 angled glide wire and Berenstein catheter. These were advanced retrograde through the left subclavian artery under fluoroscopy and around the aortic arch into the descending aorta. The wire was exchanged for a 0.035 Bentson wire and the catheter removed. A 7-Fr x 90 cm sheath was advanced into the descending thoracic aorta over the wire. Angiography demonstrated the diseased origin of the superior mesenteric artery and the distal aorta; no clear celiac or inferior mesenteric artery origins were seen. The Bentson was exchanged for the 0.035 Glidewire and Navicross catheter. Several attempts were required before the SMA origin was successfully cannulated with the wire. The catheter was advanced over the wire and used to support exchange to a 0.035 Mcdaniels wire; selective angiography was used to confirm intraluminal positioning. With the Mcdaniels wire in place in the SMA, a balloon-expandable 8 x 27 mm Visipro stent was advanced into the origin of the SMA. It was difficult to bridge the calcific plaque and this resulted in a somewhat large overhang of the stent into the aorta (advancement resulted in the stent too far distal in the SMA). The stent was deployed and dilated with the balloon. After evaluation, additional post-dilation was done of the proximal portion in the aortic margin of the SMA origin.     Completion angiography demonstrated a patent stent with improved proximal flow of contrast into the SMA. There remains distal SMA branch disease, consistent with the CT; this was not treated, as there was rapid outflow through multiple enlarged collateral branches.     The catheter and sheath were removed. The arterial lumen was  flushed with inflow, backbleeding, and heparinized saline. The brachial arteriotomy was closed with interrupted 6-0 prolene sutures. The incision was irrigated and hemostasis confirmed. The subcutaneous tissues were closed with interrupted absorbable sutures, followed by skin closure in a running subcuticular fashion with a 4-0 Monocryl suture. The skin was cleaned and dried and steri-strips and a sterile bandage applied.     At the end of the case all needle, sponge and instrument counts were correct.  Ms. Osorio was then extubated in stable condition and was taken to the postoperative care unit.

## 2023-12-21 NOTE — ANESTHESIA POSTPROCEDURE EVALUATION
Patient: Tammy Osorio    Procedure: Procedure(s):  LEFT BRACHIAL ARTERY CUTDOWN, ABDOMINAL ANGIOGRAM, AND STENTING OF SUPERIOR MESENTERIC ARTERY       Anesthesia Type:  General    Note:  Disposition: Inpatient   Postop Pain Control: Uneventful            Sign Out: Well controlled pain   PONV:    Neuro/Psych: Uneventful            Sign Out: Acceptable/Baseline neuro status   Airway/Respiratory: Uneventful            Sign Out: Acceptable/Baseline resp. status   CV/Hemodynamics: Uneventful            Sign Out: Acceptable CV status   Other NRE: NONE   DID A NON-ROUTINE EVENT OCCUR? No           Last vitals:  Vitals Value Taken Time   /47 12/21/23 1545   Temp 36.8  C (98.2  F) 12/21/23 1530   Pulse 67 12/21/23 1600   Resp 21 12/21/23 1600   SpO2 99 % 12/21/23 1600   Vitals shown include unfiled device data.    Electronically Signed By: Vidal Lee MD  December 21, 2023  4:02 PM

## 2023-12-21 NOTE — ANESTHESIA PREPROCEDURE EVALUATION
Anesthesia Pre-Procedure Evaluation    Patient: Tammy Osorio   MRN: [id number] : 1945        Procedure : Procedure(s):  ABDOMINAL ANGIOGRAM AND STENTING OF SUPERIOR MESENTERIC ARTERY  LEFT BRACHIAL ARTERY CUTDOWN          Past Medical History:   Diagnosis Date    Anxiety     Aortic regurgitation     Ascending aortic aneurysm (H24)     Diabetes (H)     Hypertension     Osteopenia     Pelvic mass     Pulmonary emphysema (H)     Sciatica, unspecified laterality       Past Surgical History:   Procedure Laterality Date    ESOPHAGOSCOPY, GASTROSCOPY, DUODENOSCOPY (EGD), COMBINED N/A 2023    Procedure: Esophagoscopy, gastroscopy, duodenoscopy (EGD), combined;  Surgeon: Boaz Ortega MD;  Location:  GI    HYSTERECTOMY TOTAL ABDOMINAL, BILATERAL SALPINGO-OOPHORECTOMY, COMBINED Bilateral 2023    Procedure: EXPLORATORY LAPAROTOMY , BILATERAL SALPINGO OOPHORECTOMY , PELVIC WASHING;  Surgeon: Naye Scruggs MD;  Location:  OR      Allergies   Allergen Reactions    Sulfa Antibiotics Swelling     Swelling of lips      Social History     Tobacco Use    Smoking status: Every Day     Types: Cigarettes    Smokeless tobacco: Never   Substance Use Topics    Alcohol use: Yes     Comment: occassional      Wt Readings from Last 1 Encounters:   23 46 kg (101 lb 6.4 oz)        Anesthesia Evaluation            ROS/MED HX  ENT/Pulmonary:     (+)                tobacco use,         moderate,  COPD,           (-) sleep apnea   Neurologic:  - neg neurologic ROS     Cardiovascular: Comment: Superior mesenteric artery stenosis  Asc Ao aneurysm    (+) Dyslipidemia hypertension- Peripheral Vascular Disease-- Symptomatic.   -  - -                           valvular problems/murmurs type: AS and AI     Previous cardiac testing   Echo: Date: 23 Results:  Interpretation Summary     1. The left ventricle is normal in size. The visual ejection fraction is  estimated at 55%.  2. The right ventricle is  "normal in structure, function and size.  3. There is moderate (2+) aortic regurgitation.  4. Ascending aorta dilatation is present. 5.6cm.     Echo 1/2023 from [facility]: EF 60%, 4+ AI, mild AS, aorta 5.7cm.    Stress Test:  Date: Results:    ECG Reviewed:  Date: Results:    Cath:  Date: Results:      METS/Exercise Tolerance:     Hematologic:       Musculoskeletal: Comment: Osteopenia      GI/Hepatic:     (+) GERD,                   Renal/Genitourinary:    (-) renal disease   Endo:     (+)  type II DM,                 (-) obesity   Psychiatric/Substance Use:     (+) psychiatric history anxiety       Infectious Disease:       Malignancy:    (-) malignancy   Other:               OUTSIDE LABS:  CBC:   Lab Results   Component Value Date    WBC 7.1 12/21/2023    WBC 7.5 12/20/2023    HGB 8.2 (L) 12/21/2023    HGB 8.7 (L) 12/20/2023    HCT 26.2 (L) 12/21/2023    HCT 27.3 (L) 12/20/2023     12/21/2023     12/20/2023     BMP:   Lab Results   Component Value Date     (L) 12/21/2023     (L) 12/20/2023    POTASSIUM 3.5 12/21/2023    POTASSIUM 4.4 12/20/2023    CHLORIDE 101 12/21/2023    CHLORIDE 102 12/20/2023    CO2 27 12/21/2023    CO2 24 12/20/2023    BUN 10.0 12/21/2023    BUN 15.2 12/20/2023    CR 0.59 12/21/2023    CR 0.67 12/20/2023     (H) 12/21/2023     (H) 12/21/2023     COAGS:   Lab Results   Component Value Date    PTT 28 12/20/2023    INR 1.09 12/20/2023     POC: No results found for: \"BGM\", \"HCG\", \"HCGS\"  HEPATIC:   Lab Results   Component Value Date    ALBUMIN 3.4 (L) 12/18/2023    PROTTOTAL 6.0 (L) 12/18/2023    ALT 11 12/18/2023    AST 21 12/18/2023    ALKPHOS 90 12/18/2023    BILITOTAL 0.5 12/18/2023     OTHER:   Lab Results   Component Value Date    ARCHANA 8.2 (L) 12/21/2023    TSH 3.53 12/18/2023       Anesthesia Plan    ASA Status:  4    NPO Status:  NPO Appropriate    Anesthesia Type: General.     - Airway: ETT   Induction: Intravenous, Propofol.   Maintenance: Balanced. "        Consents    Anesthesia Plan(s) and associated risks, benefits, and realistic alternatives discussed. Questions answered and patient/representative(s) expressed understanding.     - Discussed:     - Discussed with:  Patient      - Extended Intubation/Ventilatory Support Discussed: Yes.      - Patient is DNR/DNI Status: No     Use of blood products discussed: Yes.     - Discussed with: Patient.     - Consented: consented to blood products     Postoperative Care    Pain management: IV analgesics, Multi-modal analgesia.   PONV prophylaxis: Ondansetron (or other 5HT-3), Dexamethasone or Solumedrol     Comments:    Other Comments: Patient is counseled on the anesthesia plan and relevant anesthesia procedures including all risks and benefits. All patient questions were answered.              Vidal Lee MD    I have reviewed the pertinent notes and labs in the chart from the past 30 days and (re)examined the patient.  Any updates or changes from those notes are reflected in this note.

## 2023-12-21 NOTE — ANESTHESIA PROCEDURE NOTES
Airway       Patient location during procedure: OR       Procedure Start/Stop Times: 12/21/2023 1:22 PM  Staff -        CRNA: Echo Cortez APRN CRNA       Performed By: CRNA  Consent for Airway        Urgency: elective  Indications and Patient Condition       Indications for airway management: ariane-procedural       Induction type:intravenous       Mask difficulty assessment: 2 - vent by mask + OA or adjuvant +/- NMBA    Final Airway Details       Final airway type: endotracheal airway       Successful airway: ETT - single and Oral  Endotracheal Airway Details        ETT size (mm): 7.0       Cuffed: yes       Successful intubation technique: direct laryngoscopy       DL Blade Type: Capone 2       Grade View of Cords: 1       Adjucts: stylet       Position: Right       Measured from: lips       Secured at (cm): 22       Bite block used: None    Post intubation assessment        Placement verified by: capnometry, equal breath sounds and chest rise        Number of attempts at approach: 1       Number of other approaches attempted: 0       Secured with: tape       Ease of procedure: easy       Dentition: Intact and Unchanged    Medication(s) Administered   Medication Administration Time: 12/21/2023 1:22 PM

## 2023-12-21 NOTE — PROGRESS NOTES
Postop check    Patient doing well. No abdominal pain. No GI bleeding. No arm pain/numbness/weakness.    Abd soft and nontender, left arm dressing clean/dry/intact. Palpable left radial. Normal neuro exam.    Continue asa/plavix. Will reassess tomorrow.

## 2023-12-21 NOTE — PLAN OF CARE
Date & Time: 12/20/23-12/21/23 6703-2923    Surgery/POD#: Here for acute anemia    Behavior & Aggression: GREEN  Fall Risk: yes  Orientation: A&Ox4  ABNL VS/O2: VSS on RA  ABNL Labs: hgb 8.7  Pain Management: Denies  Bowel/Bladder: Continent of B/B, voids in the BR and no BM this shift  IV: PIV infusing NS 75 ml/hr  Diet: NPO at midnight  Activity Level: SBA w/ WK  Tests/Procedures: Plan for potential SMA stent placement today   Anticipated DC Date: Pending improvement w/ potential [facility]  Significant Information: [facility] [facility] CDI, Tele NSR

## 2023-12-21 NOTE — PLAN OF CARE
Date: 12/21/23 [id number]  Surgery/POD#: Admitted with acute anemia.   POD 0 of L brachial cutdown, Abd angiogram, and stenting of SMA artery  Behavior & Aggression: Green  Fall Risk: Yes  Orientation: A&Ox4  ABNL VS/O2: VSS on RA  ABNL Labs: See results  Pain Management: Denies pain  Bowel/Bladder: Abd sounds hypo, denies N/V. Andres cath in place   Drains:X2 PIV, infusing NS @75ml/hr. Andres cath in place and will discontinue POD1  Diet:Low carb diet, tolerating  Activity Level: Ax1/SBA GBW  Tests/Procedures: N/A  Anticipated  DC Date: Tele SR. CMS intact. Left brachial incision site CDI w/some noted drainage. Old abd incisions CDI with steri strips.

## 2023-12-21 NOTE — PROGRESS NOTES
[facility]    Internal Medicine Hospitalist Progress Note  12/21/2023  I evaluated patient on the above date.    Gus Solorzano Jr., MD  [phone] (p)  Text Page  Vocera        Assessment & Plan New actions/orders today (12/21/2023) are underlined. All lab results in the assessment and plan were reviewed.    Tammy Osorio is a 78 year old female with past medical history significant for HTN; DM2; and left adnexal mass s/p ex-lap, bilateral salpingo-oophorectomy and removal of mass (11/27/2023); who presented 12/18/2023 to the ED from her primary care clinic for evaluation of acute severe anemia.    On initial evaluation, was afebrile, /26; HR 90. Labs notable for CBC with WBC 11.8, hgb 4.9, plts normal; BMP with sodium 129, chloride 92; trop 46.     CT chest w/o contrast showed no inflammatory infiltrates; mild scattered noncalcified/indeterminate pulmonary nodules with the largest seen measuring 4.9 mm in the left lower lobe; mild left pleural effusion which appeared dependent in nature; heavily calcified thoracic aortic aneurysm with the ascending thoracic aorta measuring approximately 6.2 cm in greatest radial dimension and the descending thoracic aorta 4.2 cm.     CTA abdomen/pelvis with contrast showed trace abdominopelvic ascites, no large hematoma or evidence of active bleeding within the abdomen or pelvis; nearly occlusive left gonadal vein thrombus; tiny inferoanterior abdominal wall fluid collection (1.4 cm), likely seroma; infrarenal abdominal aortic aneurysm (4.6 cm) with anterior mural thrombus; severe celiac artery origin narrowing; small left pleural effusion.      Duodenal ulcers, suspect ischemic.  Acute normocytic anemia, suspect GI bleeding due to above.  * On 12/18, pt seen in follow-up by her primary doctor with complaints of light-headedness and weakness. She was found to have a hemoglobin of 5.0 so was sent to the ED. Notably, she did have ex-lap with BSO  and adenexal mass removal on 11/27 (see below). This was complicated by incisional hematoma, but at the time of discharge (12/1) her hemoglobin was stable at 12.1. Appears was started on ASA for DVT prophylaxis.  * Initial presentation as above. Pt denied any hematochezia, melena, hematemesis,  hematuria, or abnormal uterine bleeding.  Transfused 2U prbc's in ED. Iron studies showed iron deficiency component; retic cound elevated; TSH normal; LDH normal; B12, haptoglobin pending. Started on IV pantoprazole on admit. GI consulted on admit.  * On 12/19, occult blood positive. EGD showed extrinsic compression in the middle third of the esophagus; normal stomach; normal mucosa was found in the entire esophagus; non-bleeding duodenal ulcers with no stigmata of bleeding, biopsied. Biopsy results pending. 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 Vascular Surgery consulted and recommended SMA stent.  Recent Labs   Lab 12/20/23  0742 12/19/23  0850 12/19/23  0316 12/18/23  1559   HGB 8.7* 8.3* 6.8* 4.9*   - Plan SMA stenting, possibly today 12/21 per Vascular Surgery.  - Continue IV pantoprazole BID.  - Continue to monitor CBC - repeat in am.  - Consider prbc transfusion if hgb </= 7.0 or if significant bleeding with hemodynamic instability or if symptomatic.  - Appreciate GI and Vascular Surgery help.    Aortic aneurysms.  PAD with severe celiac origin narrowing, suspect contributing to duodenal ulcers.  * Per previous H&P: 5.5 cm on 1/2014 CT scan.  * Initial presentation and imaging as above. Chest CT showed heavily calcified thoracic aortic aneurysm with the ascending thoracic aorta measuring approximately 6.2 cm in greatest radial dimension and the descending thoracic aorta 4.2 cm. Abdominal CT showed infrarenal abdominal aortic aneurysm (4.6 cm) with anterior mural thrombus; severe celiac artery  "origin narrowing.  * On 12/19, as above, Vascular Surgery consulted and recommended SMA stent.  - Plan SMA stenting as above.  - Continue metoprolol.  - Strongly recommend tobacco cessation.  - Follow-up with Dr. Maya (CV Surgeon, [facility]) as per routine.  - Appreciate Vascular Surgery help.    Left adnexal mass s/p ex lap, bilateral salpingo-oophorectomy, and pelvic washings with removal of mass (11/27/2023).  Nearly occlusive left gonadal vein thrombus, suspect related to above.   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  * Initial presentation as above. Gynecology/oncology consulted  * On 12/19, per Gyn-Onc, \"Finding of nearly occlusive left gonadal vein thrombus is not surprising given recent left oophorectomy and we tie off this vessel to remove specimen. No action needed for that finding\"  - Continue Continue PRN acetaminophen and PTA PRN tramadol for pain control; minimize opioids as able.  - Follow-up with Gyn-Onc outpatient as scheduled.    Hyponatremia, suspect SIADH.  * Pt had issues with hyponatremia during her last admission as well which was suspected to be 2/2 SIADH. Sodium at discharge was 130.   * Sodium was 129 on admission.   * Sodium normalized 12/19.  Recent Labs   Lab 12/20/23  0742 12/19/23  0850 12/18/23  1559   * 135 129*   - Continue 1800 ml fluid restriction  - Continue to monitor sodium     Concern for new onset atrial fibrillation with RVR but suspect sinus tachycardia.  * EKG in the ED notable for atrial fibrillation. Pt has no prior history of this. Suspect this was triggered by severe anemia.   * On 12/19, reviewed ECG, appears to be sinus tachycardia with frequent PAC's.  * On 12/20, ECG showed sinus with PAC's. Echo showed LVEF 55%; RV OK; moderate AR, no AS; ascending aorta 5.6 cm; (noted that echo from 1/2023 showed LVEF 60%; 4+ AI; mild AS; aorta 5.7 cm).  - Continue to monitor on telemetry.  - Plan discharge on Holter.    Troponin " "elevation, suspect demand ischemia (type 2 NSTEMI).  * Initial presentation as above. Trop 46 on admit. Per reports, had nuclear stress study 11/1/2023 that was negative for ischemia.  * Trops subsequently \"flat\".  * Echo 12/20 as above.  Recent Labs   Lab 12/18/23  2252 12/18/23  1559   CTROPT 46* 46*   - Continue to treat other issues as noted.    Multiple pulmonary nodules.  COPD.  Tobacco dependence.  * Initial presentation as above. Pulmonary nodules noted on CT - mild scattered non-calcified/indeterminate pulmonary nodules with the largest seen measuring 4.9mm in the LLL.   - Recommend follow-up per PCP for pulmonary nodule, high risk given smoking history.  - Pt declined nicotine replacement.  - Strongly recommend tobacco cessation.     DM type II.  [PTA: metformin XR 1000 mg BID with meals.]  * Blood sugars in the 300s on admission. No recent A1C (was 7.1 in 2020).  Recent Labs   Lab 12/21/23  0600 12/21/23  0205 12/20/23  2106 12/20/23  1719 12/20/23  1407 12/20/23  1017   * 164* 182* 243* 164* 155*      No lab results found.   - Moderate CHO diet when diet advanced.  - Continue aspart ISS (medium).  - Continue to hold PTA metformin for now.    Hypertension (benign essential).  [PTA: amlodipine 10 mg daily; lisinopril 20 mg daily; metoprolol 50 mg BID.]  - Continue PTA amlodipine, lisinopril, and metoprolol.         Clinically Significant Risk Factors              # Hypoalbuminemia: Lowest albumin = 3.4 g/dL at 12/18/2023  3:59 PM, will monitor as appropriate     # Hypertension: Noted on problem list        # Cachexia: Estimated body mass index is 16.37 kg/m  as calculated from the following:    Height as of this encounter: 1.676 m (5' 6\").    Weight as of this encounter: 46 kg (101 lb 6.4 oz)., PRESENT ON ADMISSION       # Financial/Environmental Concerns: none           COVID-19 testing.  COVID-19 PCR Results           No data to display              COVID-19 Antibody Results, Testing for Immunity  " "         No data to display                Diet: Fluid restriction 1800 ML FLUID  NPO per Anesthesia Guidelines for Procedure/Surgery Except for: Meds    Prophylaxis: PCD's, ambulation.   Andres Catheter: Not present  Lines: None     Code Status: Full Code    Disposition Plan   Expected discharge: 1-2d recommended to prior living arrangement pending  above .  Entered: Gus Solorzano MD 12/21/2023, 8:12 AM     Communication.  - I d/w pt's  12/20.      Interval History   Doing OK.  Tolerating activity but feels weak.  Tolerated diet yesterday.    -Data reviewed today: I reviewed all new labs and imaging over the last 24 hours. I personally reviewed no images or EKG's today.    Physical Exam    , Blood pressure (!) 146/54, pulse 86, temperature 97.7  F (36.5  C), temperature source Oral, resp. rate 16, height 1.676 m (5' 6\"), weight 46 kg (101 lb 6.4 oz), SpO2 94%. O2 Device: None (Room air)    Vitals:    12/18/23 1554 12/19/23 0106   Weight: 49.9 kg (110 lb) 46 kg (101 lb 6.4 oz)     Vital Signs with Ranges  Temp:  [97.7  F (36.5  C)-98  F (36.7  C)] 97.7  F (36.5  C)  Pulse:  [70-86] 86  Resp:  [16] 16  BP: (127-146)/(39-74) 146/54  SpO2:  [94 %-99 %] 94 %  Patient Vitals for the past 24 hrs:   BP Temp Temp src Pulse Resp SpO2   12/21/23 0800 (!) 146/54 97.7  F (36.5  C) Oral 86 16 94 %   12/21/23 0000 131/74 98  F (36.7  C) Oral 70 16 99 %   12/20/23 1552 (!) 127/39 97.8  F (36.6  C) Oral 71 16 98 %     I/O's Last 24 hours  I/O last 3 completed shifts:  In: 642 [I.V.:642]  Out: -     Constitutional: Awake, alert, oriented, pleasant, conversant.  Respiratory: Diminished in bases. No crackles or wheezes.  Cardiovascular: RRR, no m/r/g.  Chest:   GI: Soft, nt, nd, few BS.  Skin/Integumen:   Other:        Data    Labs reviewed.  Recent Labs   Lab 12/21/23  0600 12/21/23  0205 12/20/23  2106 12/20/23  1719 12/20/23  1533 12/20/23  1017 12/20/23  0742 12/19/23  1358 12/19/23  0850 12/19/23  0635 12/19/23  0316 " 12/18/23 2157 12/18/23  1559   WBC  --   --   --   --   --   --  7.5  --  9.7  --   --   --  11.8*   HGB  --   --   --   --   --   --  8.7*  --  8.3*  --  6.8*  --  4.9*   MCV  --   --   --   --   --   --  92  --  91  --   --   --  96   PLT  --   --   --   --   --   --  271  --  251  --   --   --  356   INR  --   --   --   --  1.09  --   --   --   --   --   --   --   --    NA  --   --   --   --   --   --  134*  --  135  --   --   --  129*   POTASSIUM  --   --   --   --   --   --  4.4  --  4.0  --   --   --  4.5   CHLORIDE  --   --   --   --   --   --  102  --  101  --   --   --  92*   CO2  --   --   --   --   --   --  24  --  26  --   --   --  27   BUN  --   --   --   --   --   --  15.2  --  15.4  --   --   --  20.6   CR  --   --   --   --   --   --  0.67  --  0.57  --   --   --  0.70   ANIONGAP  --   --   --   --   --   --  8  --  8  --   --   --  10   ARCHANA  --   --   --   --   --   --  8.4*  --  8.5*  --   --   --  9.1   * 164* 182*   < >  --    < > 176*  174*   < > 178*   < >  --    < > 306*   ALBUMIN  --   --   --   --   --   --   --   --   --   --   --   --  3.4*   PROTTOTAL  --   --   --   --   --   --   --   --   --   --   --   --  6.0*   BILITOTAL  --   --   --   --   --   --   --   --   --   --   --   --  0.5   ALKPHOS  --   --   --   --   --   --   --   --   --   --   --   --  90   ALT  --   --   --   --   --   --   --   --   --   --   --   --  11   AST  --   --   --   --   --   --   --   --   --   --   --   --  21    < > = values in this interval not displayed.     Recent Labs   Lab Test 12/18/23  2252 12/18/23  1559   TROPONIN T HIGH SENSITIVITY 46* 46*     Recent Labs   Lab 12/21/23  0600 12/21/23  0205 12/20/23  2106 12/20/23  1719 12/20/23  1407 12/20/23  1017   * 164* 182* 243* 164* 155*      No lab results found.     Recent Labs   Lab 12/20/23  1533   INR 1.09     Recent Labs   Lab 12/20/23  0742 12/19/23  0850 12/18/23  2252 12/18/23  1559   WBC 7.5 9.7  --  11.8*   LDH  --   --  153   --    DAVID  --   --   --  125       MICRO:  CULTURES (INCLUDING BLOOD AND URINE):  No lab results found in last 7 days.    No results found for this or any previous visit (from the past 24 hour(s)).      Medications   All medications were reviewed.    Infusions:   sodium chloride 75 mL/hr at 12/21/23 0008     Scheduled Medications:   amLODIPine  10 mg Oral Daily    insulin aspart  1-6 Units Subcutaneous Q4H    lisinopril  20 mg Oral QPM    [Held by provider] metFORMIN  1,000 mg Oral BID w/meals    metoprolol tartrate  50 mg Oral Daily    pantoprazole  40 mg Intravenous BID    sertraline  50 mg Oral QPM    sodium chloride (PF)  3 mL Intracatheter Q8H     PRN Medications:  acetaminophen **OR** acetaminophen, calcium carbonate, glucose **OR** dextrose **OR** glucagon, lidocaine 4%, lidocaine (buffered or not buffered), metoprolol, naloxone **OR** naloxone **OR** naloxone **OR** naloxone, senna-docusate **OR** senna-docusate, sodium chloride (PF), traMADol

## 2023-12-21 NOTE — ANESTHESIA CARE TRANSFER NOTE
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    Anesthesia Type:   General     Note:    Oropharynx: oropharynx clear of all foreign objects and spontaneously breathing  Level of Consciousness: drowsy  Oxygen Supplementation: nasal cannula  Level of Supplemental Oxygen (L/min / FiO2): 2  Independent Airway: airway patency satisfactory and stable  Dentition: dentition unchanged  Vital Signs Stable: post-procedure vital signs reviewed and stable  Report to RN Given: handoff report given  Patient transferred to: PACU  Comments: Neuromuscular blockade reversed after TOF 4/4, spontaneous respirations, adequate tidal volumes, followed commands to voice, extubated atraumatically, extubated with suction, airway patent after extubation.  Oxygen via nasal cannula at 2 liters per minute to PACU. Oxygen tubing connected to wall O2 in PACU, SpO2, NiBP, and EKG monitors and alarms on and functioning, report on patient's clinical status given to PACU RN, RN questions answered.         Handoff Report: Identifed the Patient, Identified the Reponsible Provider, Reviewed the pertinent medical history, Discussed the surgical course, Reviewed Intra-OP anesthesia mangement and issues during anesthesia, Set expectations for post-procedure period and Allowed opportunity for questions and acknowledgement of understanding      Vitals:  Vitals Value Taken Time   /49 12/21/23 1530   Temp     Pulse 68 12/21/23 1533   Resp 21 12/21/23 1533   SpO2 97 % 12/21/23 1533   Vitals shown include unfiled device data.    Electronically Signed By: BRIDGER Franco CRNA  December 21, 2023  3:34 PM

## 2023-12-22 VITALS
TEMPERATURE: 98 F | HEART RATE: 77 BPM | RESPIRATION RATE: 16 BRPM | SYSTOLIC BLOOD PRESSURE: 125 MMHG | BODY MASS INDEX: 16.29 KG/M2 | DIASTOLIC BLOOD PRESSURE: 44 MMHG | WEIGHT: 101.4 LBS | OXYGEN SATURATION: 95 % | HEIGHT: 66 IN

## 2023-12-22 LAB
ANION GAP SERPL CALCULATED.3IONS-SCNC: 10 MMOL/L (ref 7–15)
BASOPHILS # BLD AUTO: 0 10E3/UL (ref 0–0.2)
BASOPHILS NFR BLD AUTO: 0 %
BUN SERPL-MCNC: 15.1 MG/DL (ref 8–23)
CALCIUM SERPL-MCNC: 8.3 MG/DL (ref 8.8–10.2)
CHLORIDE SERPL-SCNC: 103 MMOL/L (ref 98–107)
CHOLEST SERPL-MCNC: 115 MG/DL
CREAT SERPL-MCNC: 0.73 MG/DL (ref 0.51–0.95)
DEPRECATED HCO3 PLAS-SCNC: 23 MMOL/L (ref 22–29)
EGFRCR SERPLBLD CKD-EPI 2021: 84 ML/MIN/1.73M2
EOSINOPHIL # BLD AUTO: 0 10E3/UL (ref 0–0.7)
EOSINOPHIL NFR BLD AUTO: 0 %
ERYTHROCYTE [DISTWIDTH] IN BLOOD BY AUTOMATED COUNT: 15.1 % (ref 10–15)
GLUCOSE BLDC GLUCOMTR-MCNC: 149 MG/DL (ref 70–99)
GLUCOSE BLDC GLUCOMTR-MCNC: 190 MG/DL (ref 70–99)
GLUCOSE BLDC GLUCOMTR-MCNC: 203 MG/DL (ref 70–99)
GLUCOSE SERPL-MCNC: 157 MG/DL (ref 70–99)
HBA1C MFR BLD: 5.9 %
HCT VFR BLD AUTO: 29.1 % (ref 35–47)
HDLC SERPL-MCNC: 42 MG/DL
HGB BLD-MCNC: 9 G/DL (ref 11.7–15.7)
IMM GRANULOCYTES # BLD: 0.1 10E3/UL
IMM GRANULOCYTES NFR BLD: 1 %
LDLC SERPL CALC-MCNC: 50 MG/DL
LYMPHOCYTES # BLD AUTO: 0.8 10E3/UL (ref 0.8–5.3)
LYMPHOCYTES NFR BLD AUTO: 7 %
MCH RBC QN AUTO: 28.7 PG (ref 26.5–33)
MCHC RBC AUTO-ENTMCNC: 30.9 G/DL (ref 31.5–36.5)
MCV RBC AUTO: 93 FL (ref 78–100)
MONOCYTES # BLD AUTO: 1.4 10E3/UL (ref 0–1.3)
MONOCYTES NFR BLD AUTO: 11 %
NEUTROPHILS # BLD AUTO: 10.5 10E3/UL (ref 1.6–8.3)
NEUTROPHILS NFR BLD AUTO: 81 %
NONHDLC SERPL-MCNC: 73 MG/DL
NRBC # BLD AUTO: 0 10E3/UL
NRBC BLD AUTO-RTO: 0 /100
PLATELET # BLD AUTO: 317 10E3/UL (ref 150–450)
POTASSIUM SERPL-SCNC: 3.9 MMOL/L (ref 3.4–5.3)
RBC # BLD AUTO: 3.14 10E6/UL (ref 3.8–5.2)
SODIUM SERPL-SCNC: 136 MMOL/L (ref 135–145)
TRIGL SERPL-MCNC: 116 MG/DL
WBC # BLD AUTO: 12.9 10E3/UL (ref 4–11)

## 2023-12-22 PROCEDURE — C9113 INJ PANTOPRAZOLE SODIUM, VIA: HCPCS | Performed by: SURGERY

## 2023-12-22 PROCEDURE — 250N000013 HC RX MED GY IP 250 OP 250 PS 637: Performed by: SURGERY

## 2023-12-22 PROCEDURE — 80048 BASIC METABOLIC PNL TOTAL CA: CPT | Performed by: SURGERY

## 2023-12-22 PROCEDURE — 83036 HEMOGLOBIN GLYCOSYLATED A1C: CPT | Performed by: SURGERY

## 2023-12-22 PROCEDURE — 250N000011 HC RX IP 250 OP 636: Performed by: SURGERY

## 2023-12-22 PROCEDURE — 85025 COMPLETE CBC W/AUTO DIFF WBC: CPT | Performed by: SURGERY

## 2023-12-22 PROCEDURE — 82465 ASSAY BLD/SERUM CHOLESTEROL: CPT | Performed by: SURGERY

## 2023-12-22 PROCEDURE — 99238 HOSP IP/OBS DSCHRG MGMT 30/<: CPT | Performed by: INTERNAL MEDICINE

## 2023-12-22 PROCEDURE — 36415 COLL VENOUS BLD VENIPUNCTURE: CPT | Performed by: SURGERY

## 2023-12-22 RX ORDER — METFORMIN HCL 500 MG
1000 TABLET, EXTENDED RELEASE 24 HR ORAL 2 TIMES DAILY WITH MEALS
Start: 2023-12-24 | End: 2023-12-22

## 2023-12-22 RX ORDER — CLOPIDOGREL BISULFATE 75 MG/1
75 TABLET ORAL DAILY
Qty: 90 TABLET | Refills: 1 | Status: SHIPPED | OUTPATIENT
Start: 2023-12-22 | End: 2024-06-19

## 2023-12-22 RX ORDER — ATORVASTATIN CALCIUM 40 MG/1
40 TABLET, FILM COATED ORAL EVERY EVENING
Qty: 30 TABLET | Refills: 3 | Status: SHIPPED | OUTPATIENT
Start: 2023-12-22

## 2023-12-22 RX ORDER — PANTOPRAZOLE SODIUM 40 MG/1
40 TABLET, DELAYED RELEASE ORAL 2 TIMES DAILY
Qty: 60 TABLET | Refills: 3 | Status: ON HOLD | OUTPATIENT
Start: 2023-12-22 | End: 2023-12-28

## 2023-12-22 RX ORDER — CLOPIDOGREL BISULFATE 75 MG/1
75 TABLET ORAL DAILY
Qty: 90 TABLET | Refills: 0 | Status: SHIPPED | OUTPATIENT
Start: 2023-12-22 | End: 2023-12-22

## 2023-12-22 RX ADMIN — PANTOPRAZOLE SODIUM 40 MG: 40 INJECTION, POWDER, FOR SOLUTION INTRAVENOUS at 08:23

## 2023-12-22 RX ADMIN — INSULIN ASPART 2 UNITS: 100 INJECTION, SOLUTION INTRAVENOUS; SUBCUTANEOUS at 10:28

## 2023-12-22 RX ADMIN — INSULIN ASPART 1 UNITS: 100 INJECTION, SOLUTION INTRAVENOUS; SUBCUTANEOUS at 06:47

## 2023-12-22 RX ADMIN — ASPIRIN 81 MG: 81 TABLET, COATED ORAL at 08:23

## 2023-12-22 RX ADMIN — METOPROLOL TARTRATE 50 MG: 50 TABLET, FILM COATED ORAL at 08:23

## 2023-12-22 RX ADMIN — AMLODIPINE BESYLATE 10 MG: 5 TABLET ORAL at 08:23

## 2023-12-22 RX ADMIN — INSULIN ASPART 2 UNITS: 100 INJECTION, SOLUTION INTRAVENOUS; SUBCUTANEOUS at 02:19

## 2023-12-22 RX ADMIN — CLOPIDOGREL BISULFATE 75 MG: 75 TABLET ORAL at 08:23

## 2023-12-22 ASSESSMENT — ACTIVITIES OF DAILY LIVING (ADL)
ADLS_ACUITY_SCORE: 23

## 2023-12-22 NOTE — PROGRESS NOTES
"Vascular Surgery Progress Note    Subjective:  Patient doing well after stenting.  Tolerated big dinner yesterday.  No bleeding episodes.  Pain controlled.    Objective:    *Vitals, labs, intake and output, pertinent imaging, and other pertinent studies were reviewed*    Gen: NAD  HEENT: NCAT  Vascular: Left radial palpable.  Left arm incision and dressing with no signs of bleeding.  Neuro: Moving all extremities  Psych: Cooperative    Assessment and Plan:  78 year old female with SMA stenosis with associated ischemic ulcer of the duodenum and first portion of the stomach status post SMA stenting.  This was done December 21.    Patient doing well from our standpoint.  Okay to discharge from our standpoint.  Please continue aspirin and Plavix.  Plavix is prescribed.  Please follow-up with us in clinic in 2 to 4 weeks with repeat duplex.    Linda Clark MD  Vascular Surgery Fellow        /40 (BP Location: Right arm, Patient Position: Semi-Escoto's)   Pulse 73   Temp 98  F (36.7  C) (Oral)   Resp 16   Ht 1.676 m (5' 6\")   Wt 46 kg (101 lb 6.4 oz)   SpO2 98%   BMI 16.37 kg/m       Intake/Output Summary (Last 24 hours) at 12/22/2023 0740  Last data filed at 12/22/2023 0600  Gross per 24 hour   Intake 2059 ml   Output 400 ml   Net 1659 ml                "

## 2023-12-22 NOTE — PROGRESS NOTES
[facility]    Internal Medicine Hospitalist Progress Note  12/22/2023  I evaluated patient on the above date.    Gus Solorzano Jr., MD  [phone] (p)  Text Page  Vocera        Assessment & Plan New actions/orders today (12/22/2023) are underlined. All lab results in the assessment and plan were reviewed.    Tammy Osorio is a 78 year old female with past medical history significant for HTN; DM2; and left adnexal mass s/p ex-lap, bilateral salpingo-oophorectomy and removal of mass (11/27/2023); who presented 12/18/2023 to the ED from her primary care clinic for evaluation of acute severe anemia.    On initial evaluation, was afebrile, /26; HR 90. Labs notable for CBC with WBC 11.8, hgb 4.9, plts normal; BMP with sodium 129, chloride 92; trop 46.     CT chest w/o contrast showed no inflammatory infiltrates; mild scattered noncalcified/indeterminate pulmonary nodules with the largest seen measuring 4.9 mm in the left lower lobe; mild left pleural effusion which appeared dependent in nature; heavily calcified thoracic aortic aneurysm with the ascending thoracic aorta measuring approximately 6.2 cm in greatest radial dimension and the descending thoracic aorta 4.2 cm.     CTA abdomen/pelvis with contrast showed trace abdominopelvic ascites, no large hematoma or evidence of active bleeding within the abdomen or pelvis; nearly occlusive left gonadal vein thrombus; tiny inferoanterior abdominal wall fluid collection (1.4 cm), likely seroma; infrarenal abdominal aortic aneurysm (4.6 cm) with anterior mural thrombus; severe celiac artery origin narrowing; small left pleural effusion.      Duodenal ulcers, suspect ischemic related - s/p SMA origin stent 12/21/2023.  Acute normocytic anemia, suspect GI bleeding due to above.  * On 12/18, pt seen in follow-up by her primary doctor with complaints of light-headedness and weakness. She was found to have a hemoglobin of 5.0 so was sent to the  "ED. Notably, she did have ex-lap with BSO and adenexal mass removal on 11/27 (see below). This was complicated by incisional hematoma, but at the time of discharge (12/1) her hemoglobin was stable at 12.1. Appears was started on ASA for DVT prophylaxis.  * Initial presentation as above. Pt denied any hematochezia, melena, hematemesis,  hematuria, or abnormal uterine bleeding.  Transfused 2U prbc's in ED. Iron studies showed iron deficiency component; retic cound elevated; TSH normal; LDH normal; B12, haptoglobin pending. Started on IV pantoprazole on admit. GI consulted on admit.  * On 12/19, occult blood positive. EGD showed extrinsic compression in the middle third of the esophagus; normal stomach; normal mucosa was found in the entire esophagus; non-bleeding duodenal ulcers with no stigmata of bleeding, biopsied. Biopsy results pending. 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 Vascular Surgery consulted and noted \"On evaluation of her CTA, she has occluded celiac and highly stenosed SMA\". Recommended SMA stent.  * On 12/21, underwent SMA stenting.   Recent Labs   Lab 12/22/23  0841 12/21/23  0810 12/20/23  0742 12/19/23  0850 12/19/23  0316 12/18/23  1559   HGB 9.0* 8.2* 8.7* 8.3* 6.8* 4.9*   - Continue pantoprazole BID for 8 weeks, then daily.  - Continue to monitor CBC.  - Consider prbc transfusion if hgb </= 7.0 or if significant bleeding with hemodynamic instability or if symptomatic.  - Appreciate GI and Vascular Surgery help.  - SMA stent management as below.    Aortic aneurysms.  PAD, suspect contributing to duodenal ulcers - s/p SMA origin stent 12/21/2023.  * Per previous H&P: 5.5 cm on 1/2014 CT scan.  * Initial presentation and imaging as above. Chest CT showed heavily calcified thoracic aortic aneurysm with the ascending thoracic aorta measuring approximately 6.2 cm in greatest " "radial dimension and the descending thoracic aorta 4.2 cm. Abdominal CT showed infrarenal abdominal aortic aneurysm (4.6 cm) with anterior mural thrombus; severe celiac artery origin narrowing.  * On 12/19, as above, Vascular Surgery consulted and noted \"On evaluation of her CTA, she has occluded celiac and highly stenosed SMA\". Recommended SMA stent.  * On 12/21, underwent SMA stenting. Started on asa and clopidogrel as well as atorvastatin.  - Continue ASA and clopidogrel.  - Continue metoprolol.  - Strongly recommend tobacco cessation.  - Follow-up with Dr. Maya (CV Surgeon, [facility]) as per routine.  - Appreciate Vascular Surgery help.    Left adnexal mass s/p ex lap, bilateral salpingo-oophorectomy, and pelvic washings with removal of mass (11/27/2023).  Nearly occlusive left gonadal vein thrombus, suspect related to above.   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  * Initial presentation as above. Gynecology/oncology consulted  * On 12/19, per Gyn-Onc, \"Finding of nearly occlusive left gonadal vein thrombus is not surprising given recent left oophorectomy and we tie off this vessel to remove specimen. No action needed for that finding\"  - Continue Continue PRN acetaminophen and PTA PRN tramadol for pain control; minimize opioids as able.   - Follow-up with Gyn-Onc outpatient as scheduled.    Hyponatremia, suspect SIADH.  * Pt had issues with hyponatremia during her last admission as well which was suspected to be 2/2 SIADH. Sodium at discharge was 130.   * Sodium was 129 on admission.   * Sodium normal on 12/22.  - Continue 1800 ml fluid restriction  - Continue to monitor sodium     Concern for new onset atrial fibrillation with RVR but suspect sinus tachycardia.  * EKG in the ED notable for atrial fibrillation. Pt has no prior history of this. Suspect this was triggered by severe anemia.   * On 12/19, reviewed ECG, appears to be sinus tachycardia with frequent PAC's.  * " "On 12/20, ECG showed sinus with PAC's. Echo showed LVEF 55%; RV OK; moderate AR, no AS; ascending aorta 5.6 cm; (noted that echo from 1/2023 showed LVEF 60%; 4+ AI; mild AS; aorta 5.7 cm).  - Continue to monitor on telemetry.  - Plan discharge on Holter.    Troponin elevation, suspect demand ischemia (type 2 NSTEMI).  * Initial presentation as above. Trop 46 on admit. Per reports, had nuclear stress study 11/1/2023 that was negative for ischemia.  * Trops subsequently \"flat\".  * Echo 12/20 as above.  Recent Labs   Lab 12/18/23  2252 12/18/23  1559   CTROPT 46* 46*   - Continue to treat other issues as noted.    Multiple pulmonary nodules.  COPD.  Tobacco dependence.  * Initial presentation as above. Pulmonary nodules noted on CT - mild scattered non-calcified/indeterminate pulmonary nodules with the largest seen measuring 4.9mm in the LLL.   - Recommend follow-up per PCP for pulmonary nodule, high risk given smoking history.  - Pt declined nicotine replacement.  - Strongly recommend tobacco cessation.     DM type II.  [PTA: metformin XR 1000 mg BID with meals.]  * Blood sugars in the 300s on admission. No recent A1C (was 7.1 in 2020).  Recent Labs   Lab 12/22/23  0841 12/22/23  0646 12/22/23  0204 12/21/23  2159 12/21/23  1727 12/21/23  1634   * 149* 203* 273* 163* 159*      Recent Labs   Lab Test 12/22/23  0841   A1C 5.9*      - Continue low CHO diet.  - Continue aspart ISS (medium).  - Continue to hold PTA metformin at discharge until eating better.    Hypertension (benign essential).  [PTA: amlodipine 10 mg daily; lisinopril 20 mg daily; metoprolol 50 mg BID.]  - Continue PTA amlodipine, lisinopril, and metoprolol.         Clinically Significant Risk Factors              # Hypoalbuminemia: Lowest albumin = 3.4 g/dL at 12/18/2023  3:59 PM, will monitor as appropriate     # Hypertension: Noted on problem list        # Cachexia: Estimated body mass index is 16.37 kg/m  as calculated from the following:    " "Height as of this encounter: 1.676 m (5' 6\").    Weight as of this encounter: 46 kg (101 lb 6.4 oz)., PRESENT ON ADMISSION       # Financial/Environmental Concerns: none           COVID-19 testing.  COVID-19 PCR Results           No data to display              COVID-19 Antibody Results, Testing for Immunity           No data to display                Diet: Low Consistent Carb (45 g CHO per Meal) Diet    Prophylaxis: PCD's, ambulation.   Andres Catheter: Not present  Lines: None     Code Status: Full Code    Disposition Plan   Expected discharge: Today recommended to prior living arrangement.  Entered: Gus Solorzano MD 12/22/2023, 10:14 AM     Communication.  - I d/w pt's  12/20.      Interval History   Doing OK.  Notes some abdominal discomfort since the procedure, but tolerating diet.    -Data reviewed today: I reviewed all new labs and imaging over the last 24 hours. I personally reviewed no images or EKG's today.    Physical Exam    , Blood pressure 125/44, pulse 77, temperature 98  F (36.7  C), temperature source Oral, resp. rate 16, height 1.676 m (5' 6\"), weight 46 kg (101 lb 6.4 oz), SpO2 95%. O2 Device: None (Room air) Oxygen Delivery: 2 LPM  Vitals:    12/18/23 1554 12/19/23 0106   Weight: 49.9 kg (110 lb) 46 kg (101 lb 6.4 oz)     Vital Signs with Ranges  Temp:  [98  F (36.7  C)-98.5  F (36.9  C)] 98  F (36.7  C)  Pulse:  [67-77] 77  Resp:  [16-22] 16  BP: (104-127)/(40-55) 125/44  SpO2:  [92 %-100 %] 95 %  Patient Vitals for the past 24 hrs:   BP Temp Temp src Pulse Resp SpO2   12/22/23 0739 125/44 98  F (36.7  C) Oral 77 16 95 %   12/21/23 1954 112/40 98  F (36.7  C) Oral 73 16 98 %   12/21/23 1803 119/55 -- -- 68 16 96 %   12/21/23 1703 104/52 -- -- 69 16 92 %   12/21/23 1645 -- -- -- -- -- 99 %   12/21/23 1600 122/48 -- -- 67 21 99 %   12/21/23 1545 121/47 -- -- 69 18 98 %   12/21/23 1530 120/49 98.2  F (36.8  C) Temporal 67 22 100 %   12/21/23 1245 -- 98.5  F (36.9  C) Temporal -- -- -- "   12/21/23 1231 127/41 -- -- 70 16 97 %     I/O's Last 24 hours  I/O last 3 completed shifts:  In: 2059 [I.V.:2059]  Out: 400 [Urine:375; Blood:25]    Constitutional: Awake, alert, oriented, pleasant, conversant.  Respiratory: Diminished in bases. No crackles or wheezes.  Cardiovascular: RRR, no m/r/g.  Chest:   GI: Mild distension, no r/g tenderness, more BS.  Skin/Integumen:   Other:        Data    Labs reviewed.  Recent Labs   Lab 12/22/23  0841 12/22/23  0646 12/22/23  0204 12/21/23  1029 12/21/23  0810 12/20/23  1719 12/20/23  1533 12/20/23  1017 12/20/23  0742 12/18/23  2157 12/18/23  1559   WBC 12.9*  --   --   --  7.1  --   --   --  7.5   < > 11.8*   HGB 9.0*  --   --   --  8.2*  --   --   --  8.7*   < > 4.9*   MCV 93  --   --   --  92  --   --   --  92   < > 96     --   --   --  253  --   --   --  271   < > 356   INR  --   --   --   --   --   --  1.09  --   --   --   --      --   --   --  134*  --   --   --  134*   < > 129*   POTASSIUM 3.9  --   --   --  3.5  --   --   --  4.4   < > 4.5   CHLORIDE 103  --   --   --  101  --   --   --  102   < > 92*   CO2 23  --   --   --  27  --   --   --  24   < > 27   BUN 15.1  --   --   --  10.0  --   --   --  15.2   < > 20.6   CR 0.73  --   --   --  0.59  --   --   --  0.67   < > 0.70   ANIONGAP 10  --   --   --  6*  --   --   --  8   < > 10   ARCHANA 8.3*  --   --   --  8.2*  --   --   --  8.4*   < > 9.1   * 149* 203*   < > 161*   < >  --    < > 176*  174*   < > 306*   ALBUMIN  --   --   --   --   --   --   --   --   --   --  3.4*   PROTTOTAL  --   --   --   --   --   --   --   --   --   --  6.0*   BILITOTAL  --   --   --   --   --   --   --   --   --   --  0.5   ALKPHOS  --   --   --   --   --   --   --   --   --   --  90   ALT  --   --   --   --   --   --   --   --   --   --  11   AST  --   --   --   --   --   --   --   --   --   --  21    < > = values in this interval not displayed.     Recent Labs   Lab Test 12/18/23  2970 12/18/23  8424   TROPONIN  T HIGH SENSITIVITY 46* 46*     Recent Labs   Lab 12/22/23  0841 12/22/23  0646 12/22/23  0204 12/21/23  2159 12/21/23  1727 12/21/23  1634   * 149* 203* 273* 163* 159*      Recent Labs   Lab Test 12/22/23  0841   A1C 5.9*        Recent Labs   Lab 12/20/23  1533   INR 1.09     Recent Labs   Lab 12/22/23  0841 12/21/23  0810 12/20/23  0742 12/19/23  0850 12/18/23  2252 12/18/23  1559   WBC 12.9* 7.1 7.5 9.7  --  11.8*   LDH  --   --   --   --  153  --    DAVID  --   --   --   --   --  125       MICRO:  CULTURES (INCLUDING BLOOD AND URINE):  No lab results found in last 7 days.    Recent Results (from the past 24 hour(s))   IR OR Angiogram    Narrative    This exam was marked as non-reportable because it will not be read by a   radiologist or a [facility] non-radiologist provider.               Medications   All medications were reviewed.    Infusions:      Scheduled Medications:   amLODIPine  10 mg Oral Daily    aspirin  81 mg Oral Daily    atorvastatin  40 mg Oral QPM    clopidogrel  75 mg Oral Daily    insulin aspart  1-6 Units Subcutaneous Q4H    lisinopril  20 mg Oral QPM    [Held by provider] metFORMIN  1,000 mg Oral BID w/meals    metoprolol tartrate  50 mg Oral Daily    pantoprazole  40 mg Intravenous BID    sertraline  50 mg Oral QPM    sodium chloride (PF)  3 mL Intracatheter Q8H     PRN Medications:  BUPivacaine, calcium carbonate, glucose **OR** dextrose **OR** glucagon, metoprolol, naloxone **OR** naloxone **OR** naloxone **OR** naloxone, ondansetron, oxyCODONE, sodium chloride (PF), traMADol

## 2023-12-22 NOTE — DISCHARGE SUMMARY
[facility]  Discharge Summary        Tammy Osorio MRN# [id number]   YOB: 1945 Age: 78 year old     Date of Admission: 12/18/2023  Date of Discharge: 12/22/2023  Admitting Physician: Meredith Lowe MD  Discharge Physician: Gus Solorzano MD     Primary Provider: Heriberto Jackson  Primary Care Physician Phone Number: [phone]         Discharge Diagnoses:   1. Duodenal ulcers, suspect ischemic related - s/p SMA origin stent 12/21/2023.  2. Acute normocytic anemia, suspect GI bleeding due to above.  3. PAD, suspect contributing to duodenal ulcers - s/p SMA origin stent 12/21/2023.  4. Aortic aneurysms.  5. Nearly occlusive left gonadal vein thrombus, suspect related to recent gynecologic surgery.  6. Hyponatremia, suspect SIADH.  7. Concern for new onset atrial fibrillation with RVR but suspect sinus tachycardia.  8. Troponin elevation, suspect demand ischemia (type 2 NSTEMI).  9. Multiple pulmonary nodules.        Other Chronic Medical Problems:      1. COPD.  2. Tobacco dependence.  3. DM type II.  4. Hypertension (benign essential).  5. Left adnexal mass s/p ex lap, bilateral salpingo-oophorectomy, and pelvic washings with removal of mass (11/27/2023).       Allergies:         Allergies   Allergen Reactions    Sulfa Antibiotics Swelling     Swelling of lips           Discharge Medications:        Current Discharge Medication List        START taking these medications    Details   atorvastatin (LIPITOR) 40 MG tablet Take 1 tablet (40 mg) by mouth every evening  Qty: 30 tablet, Refills: 3    Associated Diagnoses: Superior mesenteric artery stenosis (H24)      clopidogrel (PLAVIX) 75 MG tablet Take 1 tablet (75 mg) by mouth daily for 180 days  Qty: 90 tablet, Refills: 1    Associated Diagnoses: Superior mesenteric artery stenosis (H24)      pantoprazole (PROTONIX) 40 MG EC tablet Take 1 tablet (40 mg) by mouth 2 times daily ; BID for 8 weeks, then daily.  Qty:  60 tablet, Refills: 3    Associated Diagnoses: Multiple duodenal ulcers           CONTINUE these medications which have NOT CHANGED    Details   amLODIPine (NORVASC) 10 MG tablet Take 10 mg by mouth daily      aspirin 81 MG EC tablet Take 1 tablet (81 mg) by mouth daily  Qty: 30 tablet, Refills: 0    Associated Diagnoses: Ovarian mass, left      lisinopril (ZESTRIL) 20 MG tablet Take 20 mg by mouth every evening      metoprolol tartrate (LOPRESSOR) 50 MG tablet Take 50 mg by mouth daily      ondansetron (ZOFRAN ODT) 4 MG ODT tab Take 1 tablet (4 mg) by mouth every 6 hours as needed for nausea or vomiting  Qty: 30 tablet, Refills: 0    Associated Diagnoses: Nausea      sertraline (ZOLOFT) 50 MG tablet Take 50 mg by mouth every evening      traMADol (ULTRAM) 50 MG tablet Take 0.5-1 tablets (25-50 mg) by mouth every 6 hours as needed for severe pain  Qty: 15 tablet, Refills: 0    Associated Diagnoses: Ovarian mass, left           STOP taking these medications       famotidine (PEPCID) 10 MG tablet Comments:   Reason for Stopping:         metFORMIN (GLUCOPHAGE XR) 500 MG 24 hr tablet Comments:   Reason for Stopping:                   Discharge Instructions and Follow-Up:      Discharge Orders      Activity    Your activity upon discharge: activity as tolerated     Follow-up and recommended labs and tests     1. Follow-up with primary care provider, Heriberto Jackson, within 7 days for hospital follow- up.  The following labs/tests are recommended: CBC, BMP, LFT's. Recommend follow-up imaging/surveillance of pulmonary nodules.  2. Follow-up primary Vascular Surgeon in 1-2 weeks.  3. Follow-up with Gynecology-Oncology as scheduled.  4. GI follow-up per [facility].     Reason for your hospital stay    1. Duodenal ulcers, suspect ischemic related - s/p SMA origin stent 12/21/2023.  2. Acute normocytic anemia, suspect GI bleeding due to above.  3. PAD, suspect contributing to duodenal ulcers - s/p SMA origin stent  12/21/2023.  4. Aortic aneurysms.  5. Nearly occlusive left gonadal vein thrombus, suspect related to recent gynecologic surgery.  6. Hyponatremia, suspect SIADH.  7. Concern for new onset atrial fibrillation with RVR but suspect sinus tachycardia.  8. Troponin elevation, suspect demand ischemia (type 2 NSTEMI).  9. Multiple pulmonary nodules.     Resume Home Care Services     Adult Holter Monitor 48 hour    Evaluate for atrial fibrillation.     Diet    Follow this diet upon discharge: Orders Placed This Encounter      Low Consistent Carb (45 g CHO per Meal) Diet             Consultations This Hospital Stay:      GYNECOLOGIC ONCOLOGY IP CONSULT  GASTROENTEROLOGY IP CONSULT  VASCULAR SURGERY IP CONSULT  CARE MANAGEMENT / SOCIAL WORK IP CONSULT        Admission History:      Please see the H&P by Meredith Lowe MD on 12/18/2023 for complete details. Briefly, Tammy Osorio is a 78 year old female with past medical history significant for HTN; DM2; and left adnexal mass s/p ex-lap, bilateral salpingo-oophorectomy and removal of mass (11/27/2023); who presented 12/18/2023 to the ED from her primary care clinic for evaluation of acute severe anemia.    On initial evaluation, was afebrile, /26; HR 90. Labs notable for CBC with WBC 11.8, hgb 4.9, plts normal; BMP with sodium 129, chloride 92; trop 46.     CT chest w/o contrast showed no inflammatory infiltrates; mild scattered noncalcified/indeterminate pulmonary nodules with the largest seen measuring 4.9 mm in the left lower lobe; mild left pleural effusion which appeared dependent in nature; heavily calcified thoracic aortic aneurysm with the ascending thoracic aorta measuring approximately 6.2 cm in greatest radial dimension and the descending thoracic aorta 4.2 cm.     CTA abdomen/pelvis with contrast showed trace abdominopelvic ascites, no large hematoma or evidence of active bleeding within the abdomen or pelvis; nearly occlusive left gonadal vein  "thrombus; tiny inferoanterior abdominal wall fluid collection (1.4 cm), likely seroma; infrarenal abdominal aortic aneurysm (4.6 cm) with anterior mural thrombus; severe celiac artery origin narrowing; small left pleural effusion.        Problem Oriented Hospital Course:        Duodenal ulcers, suspect ischemic - s/p SMA origin stent 12/21/2023.  Acute normocytic anemia, suspect GI bleeding due to above.  * On 12/18, pt seen in follow-up by her primary doctor with complaints of light-headedness and weakness. She was found to have a hemoglobin of 5.0 so was sent to the ED. Notably, she did have ex-lap with BSO and adenexal mass removal on 11/27 (see below). This was complicated by incisional hematoma, but at the time of discharge (12/1) her hemoglobin was stable at 12.1. Appears was started on ASA for DVT prophylaxis.  * Initial presentation as above. Pt denied any hematochezia, melena, hematemesis,  hematuria, or abnormal uterine bleeding.  Transfused 2U prbc's in ED. Iron studies showed iron deficiency component; retic cound elevated; TSH normal; LDH normal; B12, haptoglobin pending. Started on IV pantoprazole on admit. GI consulted on admit.  * On 12/19, occult blood positive. EGD showed extrinsic compression in the middle third of the esophagus; normal stomach; normal mucosa was found in the entire esophagus; non-bleeding duodenal ulcers with no stigmata of bleeding, biopsied. Biopsy results pending. 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 Vascular Surgery consulted and noted \"On evaluation of her CTA, she has occluded celiac and highly stenosed SMA\". Recommended SMA stent.  * On 12/21, underwent SMA stenting.   Recent Labs   Lab 12/22/23  0841 12/21/23  0810 12/20/23  0742 12/19/23  0850 12/19/23  0316 12/18/23  1559   HGB 9.0* 8.2* 8.7* 8.3* 6.8* 4.9*   - Continue pantoprazole BID for 8 " "weeks, then daily.  - Continue to monitor CBC.  - SMA stent management as below.    Aortic aneurysms.  PAD, suspect contributing to duodenal ulcers - s/p SMA origin stent 12/21/2023.  * Per previous H&P: 5.5 cm on 1/2014 CT scan.  * Initial presentation and imaging as above. Chest CT showed heavily calcified thoracic aortic aneurysm with the ascending thoracic aorta measuring approximately 6.2 cm in greatest radial dimension and the descending thoracic aorta 4.2 cm. Abdominal CT showed infrarenal abdominal aortic aneurysm (4.6 cm) with anterior mural thrombus; severe celiac artery origin narrowing.  * On 12/19, as above, Vascular Surgery consulted and noted \"On evaluation of her CTA, she has occluded celiac and highly stenosed SMA\". Recommended SMA stent.  * On 12/21, underwent SMA stenting. Started on asa and clopidogrel as well as atorvastatin.  - Continue ASA and clopidogrel.  - Continue metoprolol.  - Strongly recommend tobacco cessation.  - Follow-up with Dr. Maya (CV Surgeon, [facility]) as per routine.    Left adnexal mass s/p ex lap, bilateral salpingo-oophorectomy, and pelvic washings with removal of mass (11/27/2023).  Nearly occlusive left gonadal vein thrombus, suspect related to above.   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  * Initial presentation as above. Gynecology/oncology consulted  * On 12/19, per Gyn-Onc, \"Finding of nearly occlusive left gonadal vein thrombus is not surprising given recent left oophorectomy and we tie off this vessel to remove specimen. No action needed for that finding\"  - Continue Continue PRN acetaminophen and PTA PRN tramadol for pain control; minimize opioids as able.   - Follow-up with Gyn-Onc outpatient as scheduled.    Hyponatremia, suspect SIADH.  * Pt had issues with hyponatremia during her last admission as well which was suspected to be 2/2 SIADH. Sodium at discharge was 130.   * Sodium was 129 on admission.   * Sodium normal on " "12/22.  - Continue 1800 ml fluid restriction  - Continue to monitor sodium     Concern for new onset atrial fibrillation with RVR but suspect sinus tachycardia.  * EKG in the ED notable for atrial fibrillation. Pt has no prior history of this. Suspect this was triggered by severe anemia.   * On 12/19, reviewed ECG, appears to be sinus tachycardia with frequent PAC's.  * On 12/20, ECG showed sinus with PAC's. Echo showed LVEF 55%; RV OK; moderate AR, no AS; ascending aorta 5.6 cm; (noted that echo from 1/2023 showed LVEF 60%; 4+ AI; mild AS; aorta 5.7 cm).  - Continue to monitor on telemetry.  - Plan discharge on Holter.    Troponin elevation, suspect demand ischemia (type 2 NSTEMI).  * Initial presentation as above. Trop 46 on admit. Per reports, had nuclear stress study 11/1/2023 that was negative for ischemia.  * Trops subsequently \"flat\".  * Echo 12/20 as above.  Recent Labs   Lab 12/18/23  2252 12/18/23  1559   CTROPT 46* 46*   - Continue to treat other issues as noted.    Multiple pulmonary nodules.  COPD.  Tobacco dependence.  * Initial presentation as above. Pulmonary nodules noted on CT - mild scattered non-calcified/indeterminate pulmonary nodules with the largest seen measuring 4.9 mm in the LLL.   - Recommend follow-up per PCP for pulmonary nodule, high risk given smoking history.  - Strongly recommend tobacco cessation.     DM type II.  [PTA: metformin XR 1000 mg BID with meals.]  * Blood sugars in the 300s on admission. No recent A1C (was 7.1 in 2020).  Recent Labs   Lab 12/22/23  0841 12/22/23  0646 12/22/23  0204 12/21/23  2159 12/21/23  1727 12/21/23  1634   * 149* 203* 273* 163* 159*      Recent Labs   Lab Test 12/22/23  0841   A1C 5.9*   - Continue to hold PTA metformin at discharge until eating better.    Hypertension (benign essential).  [PTA: amlodipine 10 mg daily; lisinopril 20 mg daily; metoprolol 50 mg BID.]  - Continue PTA amlodipine, lisinopril, and metoprolol.          Pending " Results:        Unresulted Labs Ordered in the Past 30 Days of this Admission       Date and Time Order Name Status Description    12/22/2023 12:01 AM Lipid panel reflex to direct LDL In process                   Discharge Disposition:      Discharged to home.        Discharge Time:      Approximately 30 minutes.          Condition and Physical on Discharge:    See progress note on the same date as this discharge summary.          Key Imaging Studies, Lab Findings and Procedures/Surgeries:        Results for orders placed or performed during the hospital encounter of 12/18/23   Chest CT w/o contrast    Narrative    EXAM: CT CHEST W/O CONTRAST  LOCATION: [facility]  DATE: 12/18/2023    INDICATION: Eval for rib.  COMPARISON: None.  TECHNIQUE: CT chest without IV contrast. Multiplanar reformats were obtained. Dose reduction techniques were used.  CONTRAST: None.    FINDINGS:   LUNGS AND PLEURA: There are minimal changes of centrilobular emphysema seen in the upper lobes bilaterally. Minimal scattered blebs in both lungs. Slight bilateral apical pleural scarring. Minimal scattered noncalcified pulmonary nodules in both lungs   with the largest seen in the left lower lobe measuring 4.9 mm in greatest dimension. Mild left pleural effusion which appears dependent in nature.    MEDIASTINUM/AXILLAE: Heavily calcified thoracic aorta with aneurysmal dilatation of both the ascending and descending thoracic aorta. The ascending thoracic aorta measures approximately 6.2 cm in greatest radial dimension and the descending thoracic   aorta 4.2 cm. There is no evidence for a rupture of the thoracic aorta. The left thyroid lobe is not seen and presumed atrophic or surgically absent.    CORONARY ARTERY CALCIFICATION: Moderate.    UPPER ABDOMEN: No significant finding.    MUSCULOSKELETAL: Moderate scattered hypertrophic changes in the spine.      Impression    IMPRESSION:   1.  No inflammatory  infiltrates.    2.  Mild scattered noncalcified/indeterminate pulmonary nodules with the largest seen measuring 4.9 mm in the left lower lobe. Please refer to below reference for possible follow-up.    3. Mild left pleural effusion which appears dependent in nature.    4. Heavily calcified thoracic aortic aneurysm with the ascending thoracic aorta measuring approximately 6.2 cm in greatest radial dimension and the descending thoracic aorta 4.2 cm.    REFERENCE:  Guidelines for Management of Incidental Pulmonary Nodules Detected on CT Images: From the Fleischner Society 2017.   Guidelines apply to incidental nodules in patients who are 35 years or older.  Guidelines do not apply to lung cancer screening, patients with immunosuppression, or patients with known primary cancer.    MULTIPLE NODULES  Nodule size <6 mm  Low-risk patients: No follow-up needed.  High-risk patients: Optional follow-up at 12 months.    Nodule size 6 mm or larger  Low-risk patients: Follow-up CT at 3-6 months, then consider CT at 18-24 months.  High-risk patients: Follow-up CT at 3-6 months, then at 18-24 months if no change.  -Use most suspicious nodule as guide to management.   CTA Abdomen Pelvis with Contrast    Narrative    EXAM: CTA ABDOMEN PELVIS WITH CONTRAST  LOCATION: [facility]  DATE: 12/18/2023    INDICATION:Status post abdominal surgery with post op hematoma. Hgb from 11 to 5. Also fell and injured left ribs, eval for rib fx and PE  COMPARISON: CT chest 12/18/2023.  TECHNIQUE: CT angiogram abdomen pelvis during arterial phase of injection of IV contrast. 2D and 3D MIP reconstructions were performed by the CT technologist. Dose reduction techniques were used.  CONTRAST: 72mL Isovue 370    FINDINGS:  ANGIOGRAM ABDOMEN/PELVIS: Infrarenal abdominal aortic aneurysm (4.6 cm) with anterior mural thrombus. No significant surrounding inflammation. Severe atherosclerotic disease with episodic mild to moderate  narrowing. Severe right internal iliac artery   narrowing. Severe celiac artery origin narrowing. Superior mesenteric and bilateral renal arteries are patent. Inferior mesenteric artery not well visualized. Descending thoracic aortic aneurysm (4 cm).    LOWER CHEST: Small left pleural effusion with adjacent atelectasis.    HEPATOBILIARY: Right hepatic lobe cyst. No suspicious liver lesion. Gallbladder is unremarkable.    PANCREAS: Normal.    SPLEEN: Normal.    ADRENAL GLANDS: Normal.    KIDNEYS/BLADDER: Normal.    BOWEL: Normal.    LYMPH NODES/PERITONEUM: No enlarged lymph node. Trace abdominopelvic ascites. No large hematoma or evidence of active bleeding.    PELVIC ORGANS: Uterus is present. No adnexal mass. Nearly occlusive left gonadal vein thrombus ().    MUSCULOSKELETAL: Ventral abdominal surgical changes. Tiny lower anterior abdominal wall fluid collection (1.4 cm). No evidence of active bleeding.      Impression    IMPRESSION:  1.  Trace abdominopelvic ascites. No large hematoma or evidence of active bleeding within the abdomen or pelvis.  2.  Nearly occlusive left gonadal vein thrombus.  3.  Tiny inferoanterior abdominal wall fluid collection (1.4 cm), likely seroma.  4.  Infrarenal abdominal aortic aneurysm (4.6 cm) with anterior mural thrombus.  5.  Severe celiac artery origin narrowing.  6.  Small left pleural effusion.    Findings were discussed with Dr. Ozuna on 2023 at 7:23 PM   IR OR Angiogram    Narrative    This exam was marked as non-reportable because it will not be read by a   radiologist or a [place] non-radiologist provider.         Echocardiogram Complete     Value    LVEF  55%    Narrative    [id number]  [id number]  [id number]  [id number]^MIKAEL^DOMINIC^NANCY     [facility]  Echocardiography Laboratory  [address] [place] [address]     Name: LORI PETIT  MRN: [id number]  : 1945  Study Date: 2023 03:40 PM  Age: 78 yrs  Gender:  Female  Patient Location: [facility]  Reason For Study: Atrial Fibrillation  Ordering Physician: DOMINIC YOUSSEF  Performed By: Gurmeet Hill     BSA: 1.5 m2  Height: 66 in  Weight: 101 lb  HR: 76  BP: 148/63 mmHg  ______________________________________________________________________________  Procedure  Complete Echo Adult. Optison (NDC #[id number]) given intravenously.  ______________________________________________________________________________  Interpretation Summary     1. The left ventricle is normal in size. The visual ejection fraction is  estimated at 55%.  2. The right ventricle is normal in structure, function and size.  3. There is moderate (2+) aortic regurgitation.  4. Ascending aorta dilatation is present. 5.6cm.     Echo 1/2023 from [facility]: EF 60%, 4+ AI, mild AS, aorta 5.7cm.  ______________________________________________________________________________  Left Ventricle  The left ventricle is normal in size. There is normal left ventricular wall  thickness. The visual ejection fraction is estimated at 55%. Grade I or early  diastolic dysfunction. Normal left ventricular wall motion.     Right Ventricle  The right ventricle is normal in structure, function and size.     Atria  The left atrium is mildly dilated. The right atrium is mild to moderately  dilated. There is no atrial shunt seen.     Mitral Valve  There is no mitral regurgitation noted.     Tricuspid Valve  There is mild (1+) tricuspid regurgitation.     Aortic Valve  There is mild trileaflet aortic sclerosis. There is moderate (2+) aortic  regurgitation. No aortic stenosis is present.     Pulmonic Valve  The pulmonic valve is normal in structure and function.     Vessels  Ascending aorta dilatation is present. Dilation of the inferior vena cava is  present with abnormal respiratory variation in diameter.     Pericardium  There is no pericardial effusion.     Rhythm  Sinus rhythm was  noted.  ______________________________________________________________________________  MMode/2D Measurements & Calculations     IVSd: 0.90 cm  LVIDd: 4.6 cm  LVIDs: 3.6 cm  LVPWd: 0.84 cm  FS: 21.1 %  LV mass(C)d: 131.8 grams  LV mass(C)dI: 88.1 grams/m2  Ao root diam: 3.5 cm  LA dimension: 3.3 cm  asc Aorta Diam: 5.6 cm  LA/Ao: 0.95  LVOT diam: 2.0 cm  LVOT area: 3.2 cm2  Ao root diam index Ht(cm/m): 2.1  Ao root diam index BSA (cm/m2): 2.3  Asc Ao diam index BSA (cm/m2): 3.8  Asc Ao diam index Ht(cm/m): 3.4  RV Base: 3.4 cm  RWT: 0.37  TAPSE: 2.2 cm     Doppler Measurements & Calculations  MV E max arnulfo: 125.0 cm/sec  MV A max arnulfo: 66.3 cm/sec  MV E/A: 1.9  MV dec time: 0.16 sec  Ao V2 max: 250.0 cm/sec  Ao max P.0 mmHg  Ao V2 mean: 173.0 cm/sec  Ao mean P.0 mmHg  Ao V2 VTI: 42.6 cm  CY(I,D): 2.1 cm2  CY(V,D): 1.8 cm2  AI P1/2t: 457.4 msec  LV V1 max P.0 mmHg  LV V1 max: 141.0 cm/sec  LV V1 VTI: 28.1 cm  SV(LVOT): 90.0 ml  SI(LVOT): 60.2 ml/m2  PA acc time: 0.14 sec  TR max arnulfo: 286.3 cm/sec  TR max P.9 mmHg  AV Arnulfo Ratio (DI): 0.56  CY Index (cm2/m2): 1.4  E/E' av.9  Lateral E/e': 15.7  Medial E/e': 28.0  RV S Arnulfo: 16.4 cm/sec     ______________________________________________________________________________  Report approved by: Farideh Alvarado 2023 04:43 PM           EGD 2023:  Findings:       A moderate-sized area of extrinsic compression was found in the middle        third of the esophagus.        The entire examined stomach was normal.        Normal mucosa was found in the entire esophagus.        Many non-bleeding cratered duodenal ulcers with no stigmata of bleeding        were found in the duodenal bulb, in the first portion of the duodenum,        in the second portion of the duodenum and in the third portion of the        duodenum. The largest lesion was 20 mm in largest dimension. Biopsies        were taken with a cold forceps for histology. Biopsies for  histology        were taken with a cold forceps for evaluation of celiac disease.                                                                                    Impression:               - Extrinsic compression in the middle third of the                             esophagus.                             - Normal stomach.                             - Normal mucosa was found in the entire esophagus.                             - Non-bleeding duodenal ulcers with no stigmata of                             bleeding. Biopsied.       SURGERY 12/21/2023:  PREOPERATIVE DIAGNOSIS:  SMA stenosis; concern for chronic mesenteric ischemia with ischemic duodenum     POSTOPERATIVE DIAGNOSIS:  Same     PROCEDURE:   Left brachial artery cutdown and primary repair  2nd-order selective aortogram and superior mesenteric artery angiogram  Stenting of the superior mesenteric artery origin (with 8 x 27 mm stent)     SURGEON:  Natalia Mirza MD     ASSISTANT:  Linda Clark MD     ANESTHESIA:  General Endotracheal     ESTIMATED BLOOD LOSS:  25 mL     FLUORO TIME: 18.2 min  FLUORO DOSE: 233 mGy  CONTRAST: 75 mL     SPECIMENS: None

## 2023-12-22 NOTE — PLAN OF CARE
Date: 12/22/23 [id number]  Surgery/POD#: Admitted with acute anemia.   POD 1 of L brachial cutdown, Abd angiogram, and stenting of SMA artery  Behavior & Aggression: Green  Fall Risk: Yes  Orientation: A&Ox4  ABNL VS/O2: VSS on RA  ABNL Labs: See results  Pain Management: Denies pain  Bowel/Bladder: Abd sounds audible, denies N/V. Voiding adequately  Drains:PIV removed, mars removed this morning   Diet:Low carb diet, tolerating  Activity Level: SBA w/walker  Tests/Procedures: N/A  Anticipated  DC Date: Tele SR. CMS intact. Left brachial incision site with drainage, new dressing applied and materials given to family for potentially dressing changes if needed. Old abd incisions CDI with steri strips. Patient discharge instructions and medications reviewed with patient. Patient discharged with family at 12:20

## 2023-12-22 NOTE — PLAN OF CARE
Date & Time: 12/21/23-12/22/23 0378-2095    Surgery/POD#: POD 1 L brachial artery cutdown, abd angiogram & stenting of SMA artery     Behavior & Aggression: GREEN  Fall Risk: yes  Orientation: A&Ox4  ABNL VS/O2: VSS on RA  ABNL Labs: hgb 9.0  Pain Management: PRN tramadol overnight  Bowel/Bladder: Mars in place w/ good output, no BM this shift  IV: PIV infusing NS 75 ml/hr  Diet: Low carb   Activity Level: SBA w/ WK  Tests/Procedures: Removed mars at 0600 and voiding  Anticipated DC Date: Discharge orders in, going home today  Significant Information: Abd inc w/ steri strips CDI and L brachial inc site - moderate drainage w/ tegaderm CDI

## 2023-12-23 ENCOUNTER — PATIENT OUTREACH (OUTPATIENT)
Dept: CARE COORDINATION | Facility: CLINIC | Age: 78
End: 2023-12-23
Payer: MEDICARE

## 2023-12-23 NOTE — PROGRESS NOTES
Connected Care Resource Center: [facility]    Background: Transitional Care Management program identified per system criteria and reviewed by [facility] team for possible outreach.    Assessment: Upon chart review, CCRC Team member will not proceed with patient outreach related to this episode of Transitional Care Management program due to reason below:    Patient declined to answer all post hospital discharge questions with CCRC team member and disconnected call.  Patient called back and said she didn't feel like talking.    Plan: Transitional Care Management episode addressed appropriately per reason noted above.      Kell Calvillo MA  Connected Care Resource [facility]    *Connected Care Resource Team does NOT follow patient ongoing. Referrals are identified based on internal discharge reports and the outreach is to ensure patient has an understanding of their discharge instructions.

## 2023-12-25 ENCOUNTER — HOSPITAL ENCOUNTER (INPATIENT)
Facility: CLINIC | Age: 78
LOS: 3 days | Discharge: HOME HEALTH | DRG: 377 | End: 2023-12-28
Attending: EMERGENCY MEDICINE | Admitting: INTERNAL MEDICINE
Payer: MEDICARE

## 2023-12-25 ENCOUNTER — APPOINTMENT (OUTPATIENT)
Dept: CT IMAGING | Facility: CLINIC | Age: 78
DRG: 377 | End: 2023-12-25
Attending: EMERGENCY MEDICINE
Payer: MEDICARE

## 2023-12-25 DIAGNOSIS — K92.2 GASTROINTESTINAL HEMORRHAGE, UNSPECIFIED GASTROINTESTINAL HEMORRHAGE TYPE: ICD-10-CM

## 2023-12-25 DIAGNOSIS — K26.9 MULTIPLE DUODENAL ULCERS: ICD-10-CM

## 2023-12-25 LAB
ABO/RH(D): NORMAL
ALBUMIN SERPL BCG-MCNC: 2.3 G/DL (ref 3.5–5.2)
ALP SERPL-CCNC: 69 U/L (ref 40–150)
ALT SERPL W P-5'-P-CCNC: 5 U/L (ref 0–50)
ANION GAP SERPL CALCULATED.3IONS-SCNC: 13 MMOL/L (ref 7–15)
ANION GAP SERPL CALCULATED.3IONS-SCNC: 9 MMOL/L (ref 7–15)
ANTIBODY SCREEN: NEGATIVE
APTT PPP: 27 SECONDS (ref 22–38)
AST SERPL W P-5'-P-CCNC: 15 U/L (ref 0–45)
BASOPHILS # BLD AUTO: ABNORMAL 10*3/UL
BASOPHILS # BLD MANUAL: 0 10E3/UL (ref 0–0.2)
BASOPHILS NFR BLD AUTO: ABNORMAL %
BASOPHILS NFR BLD MANUAL: 0 %
BILIRUB SERPL-MCNC: 0.2 MG/DL
BLD PROD TYP BPU: NORMAL
BLOOD COMPONENT TYPE: NORMAL
BUN SERPL-MCNC: 25.7 MG/DL (ref 8–23)
BUN SERPL-MCNC: 26.2 MG/DL (ref 8–23)
CA-I BLD-MCNC: 3.7 MG/DL (ref 4.4–5.2)
CALCIUM SERPL-MCNC: 6.9 MG/DL (ref 8.8–10.2)
CALCIUM SERPL-MCNC: 7.8 MG/DL (ref 8.8–10.2)
CHLORIDE SERPL-SCNC: 104 MMOL/L (ref 98–107)
CHLORIDE SERPL-SCNC: 106 MMOL/L (ref 98–107)
CODING SYSTEM: NORMAL
CREAT SERPL-MCNC: 0.65 MG/DL (ref 0.51–0.95)
CREAT SERPL-MCNC: 0.67 MG/DL (ref 0.51–0.95)
CROSSMATCH: NORMAL
DEPRECATED HCO3 PLAS-SCNC: 18 MMOL/L (ref 22–29)
DEPRECATED HCO3 PLAS-SCNC: 20 MMOL/L (ref 22–29)
EGFRCR SERPLBLD CKD-EPI 2021: 89 ML/MIN/1.73M2
EGFRCR SERPLBLD CKD-EPI 2021: 90 ML/MIN/1.73M2
EOSINOPHIL # BLD AUTO: ABNORMAL 10*3/UL
EOSINOPHIL # BLD MANUAL: 0 10E3/UL (ref 0–0.7)
EOSINOPHIL NFR BLD AUTO: ABNORMAL %
EOSINOPHIL NFR BLD MANUAL: 0 %
ERYTHROCYTE [DISTWIDTH] IN BLOOD BY AUTOMATED COUNT: 14.6 % (ref 10–15)
ERYTHROCYTE [DISTWIDTH] IN BLOOD BY AUTOMATED COUNT: 14.9 % (ref 10–15)
FIBRINOGEN PPP-MCNC: 208 MG/DL (ref 170–490)
GLUCOSE BLDC GLUCOMTR-MCNC: 135 MG/DL (ref 70–99)
GLUCOSE BLDC GLUCOMTR-MCNC: 144 MG/DL (ref 70–99)
GLUCOSE BLDC GLUCOMTR-MCNC: 177 MG/DL (ref 70–99)
GLUCOSE BLDC GLUCOMTR-MCNC: 214 MG/DL (ref 70–99)
GLUCOSE BLDC GLUCOMTR-MCNC: 230 MG/DL (ref 70–99)
GLUCOSE BLDC GLUCOMTR-MCNC: 240 MG/DL (ref 70–99)
GLUCOSE SERPL-MCNC: 276 MG/DL (ref 70–99)
GLUCOSE SERPL-MCNC: 289 MG/DL (ref 70–99)
HCT VFR BLD AUTO: 11.9 % (ref 35–47)
HCT VFR BLD AUTO: 17.7 % (ref 35–47)
HGB BLD-MCNC: 3.5 G/DL (ref 11.7–15.7)
HGB BLD-MCNC: 5.8 G/DL (ref 11.7–15.7)
HGB BLD-MCNC: 6 G/DL (ref 11.7–15.7)
HGB BLD-MCNC: 6.7 G/DL (ref 11.7–15.7)
HGB BLD-MCNC: 8.1 G/DL (ref 11.7–15.7)
IMM GRANULOCYTES # BLD: ABNORMAL 10*3/UL
IMM GRANULOCYTES NFR BLD: ABNORMAL %
INR PPP: 1.52 (ref 0.85–1.15)
ISSUE DATE AND TIME: NORMAL
LYMPHOCYTES # BLD AUTO: ABNORMAL 10*3/UL
LYMPHOCYTES # BLD MANUAL: 0.6 10E3/UL (ref 0.8–5.3)
LYMPHOCYTES NFR BLD AUTO: ABNORMAL %
LYMPHOCYTES NFR BLD MANUAL: 5 %
MCH RBC QN AUTO: 28 PG (ref 26.5–33)
MCH RBC QN AUTO: 29.3 PG (ref 26.5–33)
MCHC RBC AUTO-ENTMCNC: 29.4 G/DL (ref 31.5–36.5)
MCHC RBC AUTO-ENTMCNC: 32.8 G/DL (ref 31.5–36.5)
MCV RBC AUTO: 89 FL (ref 78–100)
MCV RBC AUTO: 95 FL (ref 78–100)
METAMYELOCYTES # BLD MANUAL: 0.6 10E3/UL
METAMYELOCYTES NFR BLD MANUAL: 5 %
MONOCYTES # BLD AUTO: ABNORMAL 10*3/UL
MONOCYTES # BLD MANUAL: 0.2 10E3/UL (ref 0–1.3)
MONOCYTES NFR BLD AUTO: ABNORMAL %
MONOCYTES NFR BLD MANUAL: 2 %
MYELOCYTES # BLD MANUAL: 0.2 10E3/UL
MYELOCYTES NFR BLD MANUAL: 2 %
NEUTROPHILS # BLD AUTO: ABNORMAL 10*3/UL
NEUTROPHILS # BLD MANUAL: 9.5 10E3/UL (ref 1.6–8.3)
NEUTROPHILS NFR BLD AUTO: ABNORMAL %
NEUTROPHILS NFR BLD MANUAL: 86 %
NRBC # BLD AUTO: 0 10E3/UL
NRBC BLD AUTO-RTO: 0 /100
PLAT MORPH BLD: ABNORMAL
PLATELET # BLD AUTO: 257 10E3/UL (ref 150–450)
PLATELET # BLD AUTO: 373 10E3/UL (ref 150–450)
POLYCHROMASIA BLD QL SMEAR: SLIGHT
POTASSIUM SERPL-SCNC: 4.8 MMOL/L (ref 3.4–5.3)
POTASSIUM SERPL-SCNC: 5.2 MMOL/L (ref 3.4–5.3)
PROT SERPL-MCNC: 4.2 G/DL (ref 6.4–8.3)
RBC # BLD AUTO: 1.25 10E6/UL (ref 3.8–5.2)
RBC # BLD AUTO: 1.98 10E6/UL (ref 3.8–5.2)
RBC MORPH BLD: ABNORMAL
SODIUM SERPL-SCNC: 135 MMOL/L (ref 135–145)
SODIUM SERPL-SCNC: 135 MMOL/L (ref 135–145)
SPECIMEN EXPIRATION DATE: NORMAL
TARGETS BLD QL SMEAR: SLIGHT
UNIT ABO/RH: NORMAL
UNIT NUMBER: NORMAL
UNIT STATUS: NORMAL
UNIT TYPE ISBT: 5100
UNIT TYPE ISBT: 6200
UNIT TYPE ISBT: 9500
UPPER GI ENDOSCOPY: NORMAL
WBC # BLD AUTO: 11 10E3/UL (ref 4–11)
WBC # BLD AUTO: 5.9 10E3/UL (ref 4–11)

## 2023-12-25 PROCEDURE — 0W3P8ZZ CONTROL BLEEDING IN GASTROINTESTINAL TRACT, VIA NATURAL OR ARTIFICIAL OPENING ENDOSCOPIC: ICD-10-PCS | Performed by: INTERNAL MEDICINE

## 2023-12-25 PROCEDURE — 86900 BLOOD TYPING SEROLOGIC ABO: CPT | Performed by: EMERGENCY MEDICINE

## 2023-12-25 PROCEDURE — 43255 EGD CONTROL BLEEDING ANY: CPT | Performed by: INTERNAL MEDICINE

## 2023-12-25 PROCEDURE — 82330 ASSAY OF CALCIUM: CPT | Performed by: EMERGENCY MEDICINE

## 2023-12-25 PROCEDURE — G0500 MOD SEDAT ENDO SERVICE >5YRS: HCPCS | Performed by: INTERNAL MEDICINE

## 2023-12-25 PROCEDURE — 99291 CRITICAL CARE FIRST HOUR: CPT | Mod: 25

## 2023-12-25 PROCEDURE — 250N000011 HC RX IP 250 OP 636: Performed by: EMERGENCY MEDICINE

## 2023-12-25 PROCEDURE — 85610 PROTHROMBIN TIME: CPT | Performed by: EMERGENCY MEDICINE

## 2023-12-25 PROCEDURE — 85730 THROMBOPLASTIN TIME PARTIAL: CPT | Performed by: EMERGENCY MEDICINE

## 2023-12-25 PROCEDURE — 250N000011 HC RX IP 250 OP 636: Performed by: INTERNAL MEDICINE

## 2023-12-25 PROCEDURE — P9016 RBC LEUKOCYTES REDUCED: HCPCS

## 2023-12-25 PROCEDURE — 99222 1ST HOSP IP/OBS MODERATE 55: CPT | Mod: GC | Performed by: SURGERY

## 2023-12-25 PROCEDURE — 85007 BL SMEAR W/DIFF WBC COUNT: CPT | Performed by: EMERGENCY MEDICINE

## 2023-12-25 PROCEDURE — 36415 COLL VENOUS BLD VENIPUNCTURE: CPT | Performed by: EMERGENCY MEDICINE

## 2023-12-25 PROCEDURE — 250N000012 HC RX MED GY IP 250 OP 636 PS 637: Performed by: INTERNAL MEDICINE

## 2023-12-25 PROCEDURE — 86923 COMPATIBILITY TEST ELECTRIC: CPT | Performed by: INTERNAL MEDICINE

## 2023-12-25 PROCEDURE — 85027 COMPLETE CBC AUTOMATED: CPT | Performed by: EMERGENCY MEDICINE

## 2023-12-25 PROCEDURE — 85384 FIBRINOGEN ACTIVITY: CPT | Performed by: EMERGENCY MEDICINE

## 2023-12-25 PROCEDURE — 82803 BLOOD GASES ANY COMBINATION: CPT

## 2023-12-25 PROCEDURE — 36430 TRANSFUSION BLD/BLD COMPNT: CPT

## 2023-12-25 PROCEDURE — C9113 INJ PANTOPRAZOLE SODIUM, VIA: HCPCS | Performed by: INTERNAL MEDICINE

## 2023-12-25 PROCEDURE — 85018 HEMOGLOBIN: CPT | Performed by: INTERNAL MEDICINE

## 2023-12-25 PROCEDURE — P9035 PLATELET PHERES LEUKOREDUCED: HCPCS

## 2023-12-25 PROCEDURE — 86923 COMPATIBILITY TEST ELECTRIC: CPT

## 2023-12-25 PROCEDURE — 96374 THER/PROPH/DIAG INJ IV PUSH: CPT | Mod: 59

## 2023-12-25 PROCEDURE — 80053 COMPREHEN METABOLIC PANEL: CPT | Performed by: EMERGENCY MEDICINE

## 2023-12-25 PROCEDURE — 84132 ASSAY OF SERUM POTASSIUM: CPT

## 2023-12-25 PROCEDURE — 99207 PR NO BILLABLE SERVICE THIS VISIT: CPT | Performed by: INTERNAL MEDICINE

## 2023-12-25 PROCEDURE — 99292 CRITICAL CARE ADDL 30 MIN: CPT

## 2023-12-25 PROCEDURE — P9040 RBC LEUKOREDUCED IRRADIATED: HCPCS | Performed by: INTERNAL MEDICINE

## 2023-12-25 PROCEDURE — 86923 COMPATIBILITY TEST ELECTRIC: CPT | Performed by: EMERGENCY MEDICINE

## 2023-12-25 PROCEDURE — 85041 AUTOMATED RBC COUNT: CPT | Performed by: EMERGENCY MEDICINE

## 2023-12-25 PROCEDURE — 99223 1ST HOSP IP/OBS HIGH 75: CPT | Mod: AI | Performed by: INTERNAL MEDICINE

## 2023-12-25 PROCEDURE — P9037 PLATE PHERES LEUKOREDU IRRAD: HCPCS

## 2023-12-25 PROCEDURE — 74174 CTA ABD&PLVS W/CONTRAST: CPT | Mod: MA

## 2023-12-25 PROCEDURE — C9113 INJ PANTOPRAZOLE SODIUM, VIA: HCPCS | Performed by: EMERGENCY MEDICINE

## 2023-12-25 PROCEDURE — P9016 RBC LEUKOCYTES REDUCED: HCPCS | Performed by: INTERNAL MEDICINE

## 2023-12-25 PROCEDURE — 200N000001 HC R&B ICU

## 2023-12-25 PROCEDURE — P9059 PLASMA, FRZ BETWEEN 8-24HOUR: HCPCS

## 2023-12-25 PROCEDURE — 36415 COLL VENOUS BLD VENIPUNCTURE: CPT | Performed by: INTERNAL MEDICINE

## 2023-12-25 PROCEDURE — P9016 RBC LEUKOCYTES REDUCED: HCPCS | Performed by: EMERGENCY MEDICINE

## 2023-12-25 PROCEDURE — 250N000009 HC RX 250: Performed by: INTERNAL MEDICINE

## 2023-12-25 PROCEDURE — 250N000009 HC RX 250: Performed by: EMERGENCY MEDICINE

## 2023-12-25 RX ORDER — PROCHLORPERAZINE MALEATE 5 MG
5 TABLET ORAL EVERY 6 HOURS PRN
Status: DISCONTINUED | OUTPATIENT
Start: 2023-12-25 | End: 2023-12-26

## 2023-12-25 RX ORDER — ONDANSETRON 4 MG/1
4 TABLET, ORALLY DISINTEGRATING ORAL EVERY 6 HOURS PRN
Status: DISCONTINUED | OUTPATIENT
Start: 2023-12-25 | End: 2023-12-28 | Stop reason: HOSPADM

## 2023-12-25 RX ORDER — EPINEPHRINE 1 MG/ML
0.1 INJECTION, SOLUTION, CONCENTRATE INTRAVENOUS
Status: COMPLETED | OUTPATIENT
Start: 2023-12-25 | End: 2023-12-25

## 2023-12-25 RX ORDER — FENTANYL CITRATE 50 UG/ML
50-100 INJECTION, SOLUTION INTRAMUSCULAR; INTRAVENOUS EVERY 5 MIN PRN
Status: DISCONTINUED | OUTPATIENT
Start: 2023-12-25 | End: 2023-12-26

## 2023-12-25 RX ORDER — NALOXONE HYDROCHLORIDE 0.4 MG/ML
0.2 INJECTION, SOLUTION INTRAMUSCULAR; INTRAVENOUS; SUBCUTANEOUS
Status: DISCONTINUED | OUTPATIENT
Start: 2023-12-25 | End: 2023-12-26

## 2023-12-25 RX ORDER — DIPHENHYDRAMINE HYDROCHLORIDE 50 MG/ML
25-50 INJECTION INTRAMUSCULAR; INTRAVENOUS
Status: DISCONTINUED | OUTPATIENT
Start: 2023-12-25 | End: 2023-12-26

## 2023-12-25 RX ORDER — ATROPINE SULFATE 0.1 MG/ML
1 INJECTION INTRAVENOUS
Status: DISCONTINUED | OUTPATIENT
Start: 2023-12-25 | End: 2023-12-26

## 2023-12-25 RX ORDER — ONDANSETRON 2 MG/ML
4 INJECTION INTRAMUSCULAR; INTRAVENOUS EVERY 6 HOURS PRN
Status: DISCONTINUED | OUTPATIENT
Start: 2023-12-25 | End: 2023-12-28 | Stop reason: HOSPADM

## 2023-12-25 RX ORDER — NICOTINE POLACRILEX 4 MG
15-30 LOZENGE BUCCAL
Status: DISCONTINUED | OUTPATIENT
Start: 2023-12-25 | End: 2023-12-26

## 2023-12-25 RX ORDER — DEXTROSE MONOHYDRATE 25 G/50ML
25-50 INJECTION, SOLUTION INTRAVENOUS
Status: DISCONTINUED | OUTPATIENT
Start: 2023-12-25 | End: 2023-12-25

## 2023-12-25 RX ORDER — PROCHLORPERAZINE 25 MG
12.5 SUPPOSITORY, RECTAL RECTAL EVERY 12 HOURS PRN
Status: DISCONTINUED | OUTPATIENT
Start: 2023-12-25 | End: 2023-12-26

## 2023-12-25 RX ORDER — SIMETHICONE 40MG/0.6ML
133 SUSPENSION, DROPS(FINAL DOSAGE FORM)(ML) ORAL
Status: DISCONTINUED | OUTPATIENT
Start: 2023-12-25 | End: 2023-12-26

## 2023-12-25 RX ORDER — NICOTINE POLACRILEX 4 MG
15-30 LOZENGE BUCCAL
Status: DISCONTINUED | OUTPATIENT
Start: 2023-12-25 | End: 2023-12-25

## 2023-12-25 RX ORDER — NALOXONE HYDROCHLORIDE 0.4 MG/ML
0.4 INJECTION, SOLUTION INTRAMUSCULAR; INTRAVENOUS; SUBCUTANEOUS
Status: DISCONTINUED | OUTPATIENT
Start: 2023-12-25 | End: 2023-12-26

## 2023-12-25 RX ORDER — LIDOCAINE 40 MG/G
CREAM TOPICAL
Status: CANCELLED | OUTPATIENT
Start: 2023-12-25

## 2023-12-25 RX ORDER — FLUMAZENIL 0.1 MG/ML
0.2 INJECTION, SOLUTION INTRAVENOUS
Status: DISCONTINUED | OUTPATIENT
Start: 2023-12-25 | End: 2023-12-26

## 2023-12-25 RX ORDER — IOPAMIDOL 755 MG/ML
65 INJECTION, SOLUTION INTRAVASCULAR ONCE
Status: COMPLETED | OUTPATIENT
Start: 2023-12-25 | End: 2023-12-25

## 2023-12-25 RX ORDER — DEXTROSE MONOHYDRATE 25 G/50ML
25-50 INJECTION, SOLUTION INTRAVENOUS
Status: DISCONTINUED | OUTPATIENT
Start: 2023-12-25 | End: 2023-12-26

## 2023-12-25 RX ADMIN — PANTOPRAZOLE SODIUM 40 MG: 40 INJECTION, POWDER, FOR SOLUTION INTRAVENOUS at 03:20

## 2023-12-25 RX ADMIN — PANTOPRAZOLE SODIUM 40 MG: 40 INJECTION, POWDER, FOR SOLUTION INTRAVENOUS at 21:53

## 2023-12-25 RX ADMIN — MIDAZOLAM 4 MG: 1 INJECTION INTRAMUSCULAR; INTRAVENOUS at 10:21

## 2023-12-25 RX ADMIN — INSULIN ASPART 1 UNITS: 100 INJECTION, SOLUTION INTRAVENOUS; SUBCUTANEOUS at 18:31

## 2023-12-25 RX ADMIN — INSULIN ASPART 2 UNITS: 100 INJECTION, SOLUTION INTRAVENOUS; SUBCUTANEOUS at 11:47

## 2023-12-25 RX ADMIN — IOPAMIDOL 65 ML: 755 INJECTION, SOLUTION INTRAVENOUS at 03:30

## 2023-12-25 RX ADMIN — INSULIN ASPART 1 UNITS: 100 INJECTION, SOLUTION INTRAVENOUS; SUBCUTANEOUS at 19:57

## 2023-12-25 RX ADMIN — SODIUM CHLORIDE 90 ML: 9 INJECTION, SOLUTION INTRAVENOUS at 03:30

## 2023-12-25 RX ADMIN — INSULIN ASPART 2 UNITS: 100 INJECTION, SOLUTION INTRAVENOUS; SUBCUTANEOUS at 08:23

## 2023-12-25 RX ADMIN — FENTANYL CITRATE 100 MCG: 50 INJECTION, SOLUTION INTRAMUSCULAR; INTRAVENOUS at 10:21

## 2023-12-25 RX ADMIN — PANTOPRAZOLE SODIUM 40 MG: 40 INJECTION, POWDER, FOR SOLUTION INTRAVENOUS at 09:50

## 2023-12-25 ASSESSMENT — ACTIVITIES OF DAILY LIVING (ADL)
ADLS_ACUITY_SCORE: 43
ADLS_ACUITY_SCORE: 35
ADLS_ACUITY_SCORE: 43
ADLS_ACUITY_SCORE: 35

## 2023-12-25 NOTE — PROGRESS NOTES
EGD finding consistent with active bleeding from an ulcer in the second or third portion of duodenum multiple ulcers noted send duodenal bulb second and third portion of duodenum and visible vessel along with attached clot was seen clots were removed actively bleeding artery was cauterized with BiCap injected with epinephrine and complete hemostasis was achieved.  I will recommend to continue on clear liquid diet serial hemoglobin hold on antiplatelet therapy for today patient can be restarted on aspirin and Plavix possibly tomorrow however patient will be significantly high risk for recurrent bleed.  Continue to monitor in ICU GI will continue to follow along finding and plan discussed with patient's family and nursing team in the ICU along with hospitalist team.    Boaz Ortega MD FACP

## 2023-12-25 NOTE — CONSULTS
VASCULAR SURGERY CONSULT NOTE    VASCULAR SURGEON: Dr. Parekh    LOCATION:  [facility]      Tammy Osorio  Medical Record #:  [id number]  YOB: 1945  Age:  78 year old     DATE OF SERVICE: 12/25/2023    PCP: Heriberto Jackson      REASON FOR CONSULTATION: GI bleed in the setting of SMA stent    IMPRESSION: This is a 70-year-old female with history of smoking and PAD, who recently underwent SMA stenting for ischemic ulcer of duodenum and first part of the stomach.  The patient is coming with the severe GI bleed with hemoglobin 3 yesterday.    The patient has been being transfused with partial response.    Denies abdominal pain.  No further bleeding episode here.    CT reviewed, there is patent stent noted.    RECOMMENDATION:  - Okay to hold aspirin Plavix for now given the patient has acute GI bleed but this needs to be restarted as soon as possible when she is over this bleeding episodes  - This is can expected course given that patient had ischemic ulcer, this could be turned over the mucosa.  However, this mandates evaluation by gastroenterology team and possibly scoping.  - No urgent surgical intervention indicated from our standpoint.  Appreciate care from primary and ICU team.    Discussed with staff, Dr. Iglesia Clark MD      HPI:  Tammy Osorio is a 78 year old female who was seen today in consultation for GI bleed in the setting of SMA stent.  The patient noticed severe GI bleeding at home.  Denies abdominal pain.  The patient has been eating okay.    The patient was then transfused in the ED with 4 units of PRBC and 1 unit FFP for severe anemia with hemoglobin in threes.    Otherwise, the patient is currently stable.  Conversing well.  No acute issues per the patient.  No further bleeding.    REVIEW OF SYSTEMS:    A 12 point ROS was reviewed and except for what is listed in the HPI above, all others are negative    PHH:    Past Medical History:   Diagnosis Date    Anxiety      Aortic regurgitation     Ascending aortic aneurysm (H24)     Diabetes (H)     Hypertension     Osteopenia     Pelvic mass     Pulmonary emphysema (H)     Sciatica, unspecified laterality         Past Surgical History:   Procedure Laterality Date    ANGIOGRAM Left 12/21/2023    Procedure: LEFT BRACHIAL ARTERY CUTDOWN, ABDOMINAL ANGIOGRAM, AND STENTING OF SUPERIOR MESENTERIC ARTERY;  Surgeon: Natalia Mirza MD;  Location:  OR    ESOPHAGOSCOPY, GASTROSCOPY, DUODENOSCOPY (EGD), COMBINED N/A 12/19/2023    Procedure: Esophagoscopy, gastroscopy, duodenoscopy (EGD), combined;  Surgeon: Boaz Ortega MD;  Location:  GI    HYSTERECTOMY TOTAL ABDOMINAL, BILATERAL SALPINGO-OOPHORECTOMY, COMBINED Bilateral 11/27/2023    Procedure: EXPLORATORY LAPAROTOMY , BILATERAL SALPINGO OOPHORECTOMY , PELVIC WASHING;  Surgeon: Naye Scruggs MD;  Location:  OR    IR OR ANGIOGRAM  12/21/2023       ALLERGIES:    Allergies   Allergen Reactions    Sulfa Antibiotics Swelling     Swelling of lips       MEDS:    Current Facility-Administered Medications:     glucose gel 15-30 g, 15-30 g, Oral, Q15 Min PRN **OR** dextrose 50 % injection 25-50 mL, 25-50 mL, Intravenous, Q15 Min PRN **OR** glucagon injection 1 mg, 1 mg, Subcutaneous, Q15 Min PRN, Jose Weems DO    insulin aspart (NovoLOG) injection (RAPID ACTING), 1-6 Units, Subcutaneous, Q4H, Jose Weems DO, 2 Units at 12/25/23 0823    ondansetron (ZOFRAN ODT) ODT tab 4 mg, 4 mg, Oral, Q6H PRN **OR** ondansetron (ZOFRAN) injection 4 mg, 4 mg, Intravenous, Q6H PRN, Jose Weems DO    pantoprazole (PROTONIX) IV push injection 40 mg, 40 mg, Intravenous, BID, Jose Weems DO    prochlorperazine (COMPAZINE) injection 5 mg, 5 mg, Intravenous, Q6H PRN **OR** prochlorperazine (COMPAZINE) tablet 5 mg, 5 mg, Oral, Q6H PRN **OR** prochlorperazine (COMPAZINE) suppository 12.5 mg, 12.5 mg, Rectal, Q12H PRN, Jose Weems DO    SOCIAL HABITS:   Social History     Substance and Sexual Activity      Alcohol use: Yes        Comment: occassional      History   Drug Use Unknown      History   Smoking Status    Every Day    Types: Cigarettes   Smokeless Tobacco    Never        FAMILY HISTORY:  No family history on file.    PE:  /56   Pulse 84   Temp 97.6  F (36.4  C) (Temporal)   Resp 18   SpO2 95%   Wt Readings from Last 1 Encounters:   12/19/23 46 kg (101 lb 6.4 oz)     There is no height or weight on file to calculate BMI.    EXAM:  GENERAL: This is a well-developed 78 year old female who appears her stated age  EYES: Grossly normal.  CARDIAC:  Warm, well-perfused, RRR, palpable radial on the left, incision clean/dry/intact in the left brachial cutdown site  ABDOMEN: Soft, non-tender, no pulsatile mass  MUSCULOSKELETAL: Grossly normal and both lower extremities are intact.  HEME/LYMPH: No lymphedema  NEUROLOGIC: Focally intact, Alert and oriented x 3.   PSYCH: appropriate affect  INTEGUMENT: No open lesions or ulcers    Linda Clark MD  Vascular Fellow    STAFF: Patient examined in the ICU with her  present.  Very stable after resuscitation with blood products.  Had melanotic stools at home but none since admission.    Reviewed CTA .  Consider intra-abdominal bleeding.   SMA stent in place though somewhat deeper into the aorta than normal.  SMA is patent.  No evidence of bowel ischemia radiographically.       Abner Parekh MD

## 2023-12-25 NOTE — ED NOTES
DATE/TIME OF CALL RECEIVED FROM LAB:  12/25/23 at 3:44 AM   LAB TEST:  Hg  LAB VALUE:  3.5  PROVIDER NOTIFIED?: Yes  PROVIDER NAME: Dr Murray  DATE/TIME LAB VALUE REPORTED TO PROVIDER: 12/25/23@0345  MECHANISM OF PROVIDER NOTIFICATION: Face-To-Face  PROVIDER RESPONSE: transfusion orders in progress awaiting further orders.

## 2023-12-25 NOTE — ED NOTES
Bed: [facility]  Expected date: 12/25/23  Expected time: 3:04 AM  Means of arrival: Ambulance  Comments:  [address] 78F rectal bleed

## 2023-12-25 NOTE — OR NURSING
EGD completed at bedside, moderate sedation administered by bedside RN, Myles. Pt tolerated procedure well. Bipolar cautery probe used in small bowel for active bleeding, 1 ml of epi injected at site as well. VSS.

## 2023-12-25 NOTE — PLAN OF CARE
Shift Note:  AOx4  VSS  Had EGD in room this AM, they found a bleeder and treated it.  In ICU this AM also had a unit of RBC's.  Followup Hgb 6.7 at 1445hrs, have released another RBC per conditional orders.    Having clotty maroon BM's, several this shift.  Voiding on bedpan or purewick.  Possibly out of ICU tomorrow.

## 2023-12-25 NOTE — PLAN OF CARE
Goal Outcome Evaluation:      Plan of Care Reviewed With: patient, spouse    Overall Patient Progress: no changeOverall Patient Progress: no change    Outcome Evaluation: Pt arrived to unit at 0530. A/O x4. GODINEZ. VSS. 4th unit of PRBC finished before arriving to unit. Per MD continue MTP protocol. x1 unit of plasma and x1 unit of FFP infusing. No further bleeing per pt since arrival to ED. spouse at bedside.      Problem: Gastrointestinal Bleeding  Goal: Optimal Coping with Acute Illness  Outcome: Not Progressing  Goal: Hemostasis  Outcome: Not Progressing

## 2023-12-25 NOTE — ED PROVIDER NOTES
History     Chief Complaint:  Rectal Bleeding       HPI   Tammy Osorio is a 78 year old female who presents to the emergency department in extremis with massive GI bleed.  History obtained from the patient, EMS, patient's , as well as the medical chart.  She was recently admitted and had an SMA stent placed.  She was noted to have significant gastric and duodenal ulcers prior to stent placement.  She was started on aspirin and Plavix for her stent.  She was found to be weak, pale, and with massive GI blood loss.  She reports feeling short of breath, having pain in her chest, having abdominal pain, and feeling like she is going to pass out.      Independent Historian:    EMS - They report the patient was recently discharged from the hospital at home with her  when she started to have very heavy GI bleeding.    Review of External Notes:  Reviewed recent vascular surgery operative note    Medications:    Currently taking both aspirin and Plavix    Past Medical History:    Past Medical History:   Diagnosis Date    Anxiety     Aortic regurgitation     Ascending aortic aneurysm (H24)     Diabetes (H)     Hypertension     Osteopenia     Pelvic mass     Pulmonary emphysema (H)     Sciatica, unspecified laterality        Past Surgical History:    Past Surgical History:   Procedure Laterality Date    ANGIOGRAM Left 12/21/2023    Procedure: LEFT BRACHIAL ARTERY CUTDOWN, ABDOMINAL ANGIOGRAM, AND STENTING OF SUPERIOR MESENTERIC ARTERY;  Surgeon: Natalia Mirza MD;  Location:  OR    ESOPHAGOSCOPY, GASTROSCOPY, DUODENOSCOPY (EGD), COMBINED N/A 12/19/2023    Procedure: Esophagoscopy, gastroscopy, duodenoscopy (EGD), combined;  Surgeon: Boaz Ortega MD;  Location:  GI    HYSTERECTOMY TOTAL ABDOMINAL, BILATERAL SALPINGO-OOPHORECTOMY, COMBINED Bilateral 11/27/2023    Procedure: EXPLORATORY LAPAROTOMY , BILATERAL SALPINGO OOPHORECTOMY , PELVIC WASHING;  Surgeon: Naye Scruggs MD;   Location: [facility] OR    IR OR ANGIOGRAM  12/21/2023          Physical Exam   Patient Vitals for the past 24 hrs:   BP Temp Temp src Pulse Resp SpO2   12/25/23 0715 (!) 144/65 -- -- 83 19 99 %   12/25/23 0700 (!) 146/59 -- -- 80 19 99 %   12/25/23 0645 138/57 -- -- 80 16 95 %   12/25/23 0630 (!) 140/54 -- -- 80 20 92 %   12/25/23 0615 (!) 140/60 -- -- 79 20 95 %   12/25/23 0530 (!) 151/62 -- -- -- 30 --   12/25/23 0503 (!) 147/62 -- -- 78 21 100 %   12/25/23 0454 132/81 -- -- 82 25 99 %   12/25/23 0451 110/65 -- -- -- -- --   12/25/23 0439 94/81 -- -- 82 22 99 %   12/25/23 0436 -- -- -- 78 21 --   12/25/23 0431 -- -- -- 82 22 --   12/25/23 0426 (!) 60/31 -- -- 88 24 --   12/25/23 0421 (!) 81/33 -- -- 94 -- --   12/25/23 0416 (!) 106/34 -- -- 87 -- --   12/25/23 0406 97/74 -- -- 84 -- 99 %   12/25/23 0401 106/65 -- -- 80 -- 99 %   12/25/23 0356 127/71 -- -- 90 -- 97 %   12/25/23 0351 (!) 78/57 -- -- 98 -- (!) 84 %   12/25/23 0346 103/60 -- -- 83 -- 100 %   12/25/23 0345 103/60 -- -- 83 -- 100 %   12/25/23 0340 (!) 85/47 -- -- 85 -- --   12/25/23 0325 -- 97.6  F (36.4  C) Temporal -- -- --   12/25/23 0322 -- -- -- 80 21 97 %   12/25/23 0317 92/45 -- -- 82 24 94 %        Physical Exam  General: Resting on the gurney, appears very ill and uncomfortable  Head:  The scalp, face, and head appear normal  Mouth/Throat: Mucus membranes are dry and pale  CV:  Regular rate    Normal S1 and S2  No pathological murmur   Resp:  Breath sounds clear and equal bilaterally    Non-labored, no retractions or accessory muscle use    No coarseness    No wheezing   GI:  Appropriately healing midline incision.  Abdominal bruising present.  Copious rené blood from the rectum.  MS:  Normal motor assessment of all extremities.  Left upper extremity with a surgical bandage in place.  Skin:  Extremely pale.  Neuro:   Speech is normal and fluent. No apparent deficit.  Psych: Awake. Alert.  Normal affect.      Appropriate interactions.      Emergency  Department Course     Imaging:  CTA Abdomen Pelvis with Contrast   Final Result   IMPRESSION:      1.  Interval placement of stent in the SMA, patent. 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      2.  No active contrast extravasation to indicate acute hemorrhage.      3.  Stable 4.4 cm infrarenal abdominal aortic aneurysm with increased associated thrombus formation.      4.  Increased small right/moderate left pleural effusions, small volume ascites and generalized body wall edema.        Report per radiology    Laboratory:  Labs Ordered and Resulted from Time of ED Arrival to Time of ED Departure   COMPREHENSIVE METABOLIC PANEL - Abnormal       Result Value    Sodium 135      Potassium 5.2      Carbon Dioxide (CO2) 18 (*)     Anion Gap 13      Urea Nitrogen 26.2 (*)     Creatinine 0.67      GFR Estimate 89      Calcium 7.8 (*)     Chloride 104      Glucose 289 (*)     Alkaline Phosphatase 69      AST 15      ALT 5      Protein Total 4.2 (*)     Albumin 2.3 (*)     Bilirubin Total 0.2     CBC WITH PLATELETS AND DIFFERENTIAL - Abnormal    WBC Count 11.0      RBC Count 1.25 (*)     Hemoglobin 3.5 (*)     Hematocrit 11.9 (*)     MCV 95      MCH 28.0      MCHC 29.4 (*)     RDW 14.9      Platelet Count 373      % Neutrophils        % Lymphocytes        % Monocytes        % Eosinophils        % Basophils        % Immature Granulocytes        NRBCs per 100 WBC 0      Absolute Neutrophils        Absolute Lymphocytes        Absolute Monocytes        Absolute Eosinophils        Absolute Basophils        Absolute Immature Granulocytes        Absolute NRBCs 0.0     CBC WITH PLATELETS - Abnormal    WBC Count 5.9      RBC Count 1.98 (*)     Hemoglobin 5.8 (*)     Hematocrit 17.7 (*)     MCV 89      MCH 29.3      MCHC 32.8      RDW 14.6      Platelet Count 257     INR - Abnormal    INR 1.52 (*)    BASIC METABOLIC PANEL - Abnormal    Sodium 135      Potassium 4.8      Chloride 106      Carbon  Dioxide (CO2) 20 (*)     Anion Gap 9      Urea Nitrogen 25.7 (*)     Creatinine 0.65      GFR Estimate 90      Calcium 6.9 (*)     Glucose 276 (*)    IONIZED CALCIUM - Abnormal    Calcium Ionized Whole Blood 3.7 (*)    DIFFERENTIAL - Abnormal    % Neutrophils 86      % Lymphocytes 5      % Monocytes 2      % Eosinophils 0      % Basophils 0      % Metamyelocytes 5      % Myelocytes 2      Absolute Neutrophils 9.5 (*)     Absolute Lymphocytes 0.6 (*)     Absolute Monocytes 0.2      Absolute Eosinophils 0.0      Absolute Basophils 0.0      Absolute Metamyelocytes 0.6 (*)     Absolute Myelocytes 0.2 (*)     RBC Morphology Confirmed RBC Indices      Platelet Assessment        Value: Automated Count Confirmed. Platelet morphology is normal.    Polychromasia Slight (*)     Target Cells Slight (*)    PARTIAL THROMBOPLASTIN TIME - Normal    aPTT 27     FIBRINOGEN ACTIVITY - Normal    Fibrinogen Activity 208     TYPE AND SCREEN, ADULT    ABO/RH(D) O NEG      Antibody Screen Negative      SPECIMEN EXPIRATION DATE [id number]     PREPARE RED BLOOD CELLS (UNIT)    ISSUE DATE AND TIME [id number]      Blood Component Type Red Blood Cells      Product Code R1047A05      Unit Status Issued      Unit Number [id number]      UNIT ABO/RH O+      CODING SYSTEM [id number]      UNIT TYPE ISBT 5100     PREPARE RED BLOOD CELLS (UNIT)    ISSUE DATE AND TIME [id number]      Blood Component Type Red Blood Cells      Product Code Z6683X21      Unit Status Issued      Unit Number [id number]      UNIT ABO/RH O+      CODING SYSTEM [id number]      UNIT TYPE ISBT 5100     PREPARE PLASMA (UNIT)    ISSUE DATE AND TIME [id number]      Blood Component Type Plasma      Product Code Z8270G84      Unit Status Issued      Unit Number [id number]      UNIT ABO/RH A+      CODING SYSTEM [id number]      UNIT TYPE ISBT 6200     PREPARE RED BLOOD CELLS (UNIT)    ISSUE DATE AND TIME [id number]      Blood Component Type Red Blood Cells       Product Code K1307V00      Unit Status Issued      Unit Number [id number]      UNIT ABO/RH O+      CODING SYSTEM IGDN195      UNIT TYPE ISBT 5100     PREPARE RED BLOOD CELLS (UNIT)    ISSUE DATE AND TIME [id number]      Blood Component Type Red Blood Cells      Product Code C1086C24      Unit Status Issued      Unit Number [id number]      UNIT ABO/RH O+      CODING SYSTEM VZGX518      UNIT TYPE ISBT 5100     PREPARE PLASMA (UNIT)    ISSUE DATE AND TIME [id number]      Blood Component Type Plasma      Product Code G3746J94      Unit Status Issued      Unit Number [id number]      UNIT ABO/RH A+      CODING SYSTEM JXKS023      UNIT TYPE ISBT 6200     PREPARE PHERESED PLATELETS (UNIT)    ISSUE DATE AND TIME [id number]      Blood Component Type Platelets      Product Code G8953K68      Unit Status Issued      Unit Number [id number]      UNIT ABO/RH A+      CODING SYSTEM JTCK153      UNIT TYPE ISBT 6200     PREPARE RED BLOOD CELLS (UNIT)    Blood Component Type Red Blood Cells      Product Code I3256L43      Unit Status Issued      Unit Number [id number]      CROSSMATCH Compatible      CODING SYSTEM GJRD227      ISSUE DATE AND TIME 20231225052000      UNIT ABO/RH O-      UNIT TYPE ISBT 9500     PREPARE RED BLOOD CELLS (UNIT)    Blood Component Type Red Blood Cells      Product Code N1501N90      Unit Status Issued      Unit Number [id number]      CROSSMATCH Compatible      CODING SYSTEM WEQA764      ISSUE DATE AND TIME 20231225052000      UNIT ABO/RH O-      UNIT TYPE ISBT 9500     ABO/RH TYPE AND SCREEN      Emergency Department Course & Assessments:    Interventions:  Medications   ondansetron (ZOFRAN ODT) ODT tab 4 mg (has no administration in time range)     Or   ondansetron (ZOFRAN) injection 4 mg (has no administration in time range)   prochlorperazine (COMPAZINE) injection 5 mg (has no administration in time range)     Or   prochlorperazine (COMPAZINE) tablet 5 mg (has no  administration in time range)     Or   prochlorperazine (COMPAZINE) suppository 12.5 mg (has no administration in time range)   glucose gel 15-30 g (has no administration in time range)     Or   dextrose 50 % injection 25-50 mL (has no administration in time range)     Or   glucagon injection 1 mg (has no administration in time range)   insulin aspart (NovoLOG) injection (RAPID ACTING) (has no administration in time range)   pantoprazole (PROTONIX) IV push injection 40 mg (has no administration in time range)   pantoprazole (PROTONIX) IV push injection 40 mg (40 mg Intravenous $Given 12/25/23 0320)   iopamidol (ISOVUE-370) solution 65 mL (65 mLs Intravenous $Given 12/25/23 0330)   sodium chloride 0.9 % CT scan flush use (90 mLs Intravenous $Given 12/25/23 0330)        Assessments:  Patient seen and examined on arrival.  Given profound pallor and obvious GI bleeding 2 units of uncrossed matched blood were ordered.  When the patient's hemoglobin was unable to be read on the i-STAT with a hematocrit of less than 15 massive transfusion protocol was initiated.  I was at bedside throughout the majority of the patient's emergency department stay.    Consultations/Discussion of Management or Tests:  Case discussed with vascular surgery.  Case discussed with DAMIEN Allred.  Case discussed with hospitalist,      Disposition:  The patient was admitted to the hospital under the care of Dr. Weems.     Impression & Plan      Medical Decision Making:  Tammy Osorio is a 78 year old female presents for evaluation of maroon blood per rectum. This occurred shortly prior to arrival. Upon arrival to the emergency department, the patient was extremely pale and had low  blood pressure.  Given the degree of pallor and active bleeding 2 units of uncrossed matched blood were immediately ordered.  Two IVs were placed and a type and screen was obtained, as well as standard labs. The patient's hemoglobin was 3.5. Therefore,  massive transfusion protocol was initiated.  Bleeding most likely due to known ulcers now being reperfused after placement of her stent.  The patient was discussed with the hospitalist for admission to ICU.   Disposition:   The patient is admitted to ICU in critical but stabilized condition.       Critical Care time:  was 125 minutes for this patient excluding procedures.    Diagnosis:    ICD-10-CM    1. Gastrointestinal hemorrhage, unspecified gastrointestinal hemorrhage type  K92.2                   Alycia Murray MD  12/25/23 0751

## 2023-12-25 NOTE — H&P
[facility]    History and Physical - Hospitalist Service       Date of Admission:  12/25/2023     Assessment & Plan      Tammy Osorio is a 78 year old female with a history of DM2, Htn, COPD, recent admission from 12/18-22 for acute anemia, duodenal ulcers and SMA stenosis who is admitted on 12/25/2023 with acute GI bleed and severe anemia.     Severe, acute blood loss anemia - hgb of 3.5 on admission  Multiple cratered duodenal ulcers  Acute GI bleed  Likely hemorrhagic shock  Patient presents with hgb of 3.5 after recent admission from 12/18-22 for hgb of 5.0 and findings of multiple, large, cratered duodenal ulcers, large at 20mm. Also newly started on ASA and plavix after placement of SMA stent for severe SMA stenosis. Acute GI bleed likely a combination of ASA/plavix and reperfusion of SMA. Has intermittently had SBP in the 50-70s but is responding to massive transfusion protocol with 4 units PRBCs, platelets and FFP.   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 Seen by vascular surgery during previous hospitalization in December, SMA stent placed.   -vascular surgery consult given acute GI bleed  -holding ASA and plavix until assessment by GI and vascular surgery    DM2  Recently taken of metformin per the . Hgb A1C of 5.9 in December 2023.   -sliding scale ordered    Hypertension  Has had intermittent hypotension with massive GI bleed.   -hold PTA amlodipine, lisinopril and  "metoprolol for now    Anxiety  -hold PTA zoloft for now    Left adnexal mass s/p ex lap, bilateral salpingo-oophorectomy, and pelvic washings with removal of mass (11/27/2023).  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  -follow up with GYN onc as scheduled       Diet: NPO for Medical/Clinical Reasons Except for: No Exceptions  DVT Prophylaxis: Pneumatic Compression Devices  Code Status:  Full, discussed with patient and     Disposition: 3-4+ days likely given complexity of GI bleed with recently started ASA/Plavix due to SMA stent    Clinically Significant Risk Factors Present on Admission          # Hypocalcemia: Lowest iCa = 3.7 mg/dL in last 2 days, will monitor and replace as appropriate     # Hypoalbuminemia: Lowest albumin = 2.3 g/dL at 12/25/2023  3:15 AM, will monitor as appropriate  # Coagulation Defect: INR = 1.52 (Ref range: 0.85 - 1.15) and/or PTT = 27 Seconds (Ref range: 22 - 38 Seconds), will monitor for bleeding  # Drug Induced Platelet Defect: home medication list includes an antiplatelet medication     # Hypertension: Noted on problem list        # Cachexia: Estimated body mass index is 16.37 kg/m  as calculated from the following:    Height as of 12/19/23: 1.676 m (5' 6\").    Weight as of 12/19/23: 46 kg (101 lb 6.4 oz).               Jose Weems, DO  Hospitalist Service  [facility]  Securely message with [employer] (more info)  Text page via [facility] Paging/Directory     ______________________________________________________________________    Chief Complaint   GI bleed    History is obtained from the patient,  and ED physician    History of Present Illness   Tammy Osorio is a 78 year old female who has a history of DM2, htn, COPD, duodenal ulcer with recent admission for anemia and SMA stenosis with stent placement who returns to the ED with concerns of GI bleed. Patient was admitted from 12/18-22 after being found to " have a hgb of 5 and sent to the ED. She was transfused and had an EGD showing multiple cratered, nonbleeding duodenal ulcers, largest of which was 20mm. Also noted was occluded celiac and severely stenosed SMA. Vascular surgery was consulted and placed SMA stent. She was initiated on ASA and plavix which she has been taking. Prior to this all back on 11/27 she underwent ex-lap with BSO and was subsequently found to have nearly occluded gonadal vein thrombosis which GYN noted to be common after her surgery and no specific treatment was needed.     Following her discharge on 12/22 she felt a bit lightheaded at times and her  noted a bit of blood on the toilet paper on 12/23. On 12/24 she had a large, bloody stool which was the cause of her presentation to the ED. She had some abdominal pain in her central abdomen with this. Denies any chest pain, cough or shortness of breath. Upon arrival to the ED she was noted to have a hgb of 3.5, massive transfusion protocol was initiated with 4 units PRBCs along with platelets and FFP. Her color has improved and she symptomatically feels better. GI and vascular surgery were called in the ED. Admission to ICU planned.       Past Medical History    Past Medical History:   Diagnosis Date    Anxiety     Aortic regurgitation     Ascending aortic aneurysm (H24)     Diabetes (H)     Hypertension     Osteopenia     Pelvic mass     Pulmonary emphysema (H)     Sciatica, unspecified laterality        Past Surgical History   Past Surgical History:   Procedure Laterality Date    ANGIOGRAM Left 12/21/2023    Procedure: LEFT BRACHIAL ARTERY CUTDOWN, ABDOMINAL ANGIOGRAM, AND STENTING OF SUPERIOR MESENTERIC ARTERY;  Surgeon: Natalia Mirza MD;  Location:  OR    ESOPHAGOSCOPY, GASTROSCOPY, DUODENOSCOPY (EGD), COMBINED N/A 12/19/2023    Procedure: Esophagoscopy, gastroscopy, duodenoscopy (EGD), combined;  Surgeon: Boaz Ortega MD;  Location:  GI    HYSTERECTOMY  TOTAL ABDOMINAL, BILATERAL SALPINGO-OOPHORECTOMY, COMBINED Bilateral 11/27/2023    Procedure: EXPLORATORY LAPAROTOMY , BILATERAL SALPINGO OOPHORECTOMY , PELVIC WASHING;  Surgeon: Naye Scruggs MD;  Location: [facility] OR    IR OR ANGIOGRAM  12/21/2023       Prior to Admission Medications   Prior to Admission Medications   Prescriptions Last Dose Informant Patient Reported? Taking?   amLODIPine (NORVASC) 10 MG tablet  Self Yes No   Sig: Take 10 mg by mouth daily   aspirin 81 MG EC tablet  Self No No   Sig: Take 1 tablet (81 mg) by mouth daily   atorvastatin (LIPITOR) 40 MG tablet   No No   Sig: Take 1 tablet (40 mg) by mouth every evening   clopidogrel (PLAVIX) 75 MG tablet   No No   Sig: Take 1 tablet (75 mg) by mouth daily for 180 days   lisinopril (ZESTRIL) 20 MG tablet  Self Yes No   Sig: Take 20 mg by mouth every evening   metoprolol tartrate (LOPRESSOR) 50 MG tablet  Self Yes No   Sig: Take 50 mg by mouth daily   ondansetron (ZOFRAN ODT) 4 MG ODT tab  Self No No   Sig: Take 1 tablet (4 mg) by mouth every 6 hours as needed for nausea or vomiting   pantoprazole (PROTONIX) 40 MG EC tablet   No No   Sig: Take 1 tablet (40 mg) by mouth 2 times daily ; BID for 8 weeks, then daily.   sertraline (ZOLOFT) 50 MG tablet  Self Yes No   Sig: Take 50 mg by mouth every evening   traMADol (ULTRAM) 50 MG tablet  Self No No   Sig: Take 0.5-1 tablets (25-50 mg) by mouth every 6 hours as needed for severe pain      Facility-Administered Medications: None        Review of Systems    The 10 point Review of Systems is negative other than noted in the HPI or here.      Physical Exam   Vital Signs: Temp: 97.6  F (36.4  C) Temp src: Temporal BP: 132/81 Pulse: 82   Resp: 25 SpO2: 99 %      Weight: 0 lbs 0 oz    Gen: lying in bed, appears pale and ill  CV: RRR, no m/r/g  Pulm: CTAB, no wheeze or rhonchi  GI: +BS, soft, NT/ND    Medical Decision Making       75 MINUTES SPENT BY ME on the date of service doing chart review, history, exam,  documentation & further activities per the note.      Data     I have personally reviewed the following data over the past 24 hrs:    5.9  \   5.8 (LL)   / 257     135 106 25.7 (H) /  276 (H)   4.8 20 (L) 0.65 \     ALT: 5 AST: 15 AP: 69 TBILI: 0.2   ALB: 2.3 (L) TOT PROTEIN: 4.2 (L) LIPASE: N/A     INR:  1.52 (H) PTT:  27   D-dimer:  N/A Fibrinogen:  208       Imaging results reviewed over the past 24 hrs:   Recent Results (from the past 24 hour(s))   CTA Abdomen Pelvis with Contrast    Narrative    EXAM: CTA ABDOMEN PELVIS WITH CONTRAST  LOCATION: [facility]  DATE: 12/25/2023    INDICATION: recent sma stent placement with bright red blood per rectum  COMPARISON: 12/18/2023  TECHNIQUE: CT angiogram abdomen pelvis during arterial phase of injection of IV contrast. 2D and 3D MIP reconstructions were performed by the CT technologist. Dose reduction techniques were used.  CONTRAST: 65 mL Isovue 370    FINDINGS:  ANGIOGRAM ABDOMEN/PELVIS: Moderate aortoiliac atherosclerotic calcification, unchanged. An infrarenal abdominal aortic aneurysm measuring 4.4 cm AP by 4.3 cm transverse is not substantially changed. Associated thrombus formation, however, has increased   from recent study. Segmental moderate narrowing of the proximal left common iliac artery redemonstrated, unchanged. Right internal iliac artery is proximally occluded with distal reconstitution. A new stent extending from the aortic lumen to the proximal   SMA is patent. 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 No active contrast extravasation identified.    LOWER CHEST: Moderate left pleural effusion has increased in size. Trace right pleural effusion is new. Atelectasis seen in the left lung base. No confluent airspace consolidations.    HEPATOBILIARY: Normal. A 1.5 cm cyst again seen in the central liver, unchanged.    PANCREAS: Normal.    SPLEEN: Normal.    ADRENAL GLANDS:  Normal.    KIDNEYS/BLADDER: Normal. 2 small to characterize cystic lesions in the right kidney statistically represents cysts and do not require imaging workup. Gas within the bladder lumen is likely related to recent Andres instrumentation. Urinary bladder is   otherwise unremarkable.    BOWEL: No focal bowel wall thickening or obstruction. Appendix is normal. Small volume free fluid has increased from prior study. No intraperitoneal free air.    LYMPH NODES: Normal.    PELVIC ORGANS: Normal.    MUSCULOSKELETAL: Diffuse body wall edema has increased from recent study. Multilevel mild and moderate degenerative disc space narrowing seen in the visualized thoracolumbar spine most advanced at L4/L5. No suspicious osseous lesions or acute fractures.   Small bilateral fat-containing inguinal hernias noted.        Impression    IMPRESSION:    1.  Interval placement of stent in the SMA, patent. 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    2.  No active contrast extravasation to indicate acute hemorrhage.    3.  Stable 4.4 cm infrarenal abdominal aortic aneurysm with increased associated thrombus formation.    4.  Increased small right/moderate left pleural effusions, small volume ascites and generalized body wall edema.

## 2023-12-25 NOTE — PHARMACY-ADMISSION MEDICATION HISTORY
Pharmacy Intern Admission Medication History    Admission medication history is complete. The information provided in this note is only as accurate as the sources available at the time of the update.    Information Source(s): Patient and CareEverywhere/SureScripts via in-person    Pertinent Information: Current med list and last doses updated per patient's testimony.     Changes made to PTA medication list:  Added: None  Deleted: None  Changed: None    Allergies reviewed with patient and updates made in EHR: no    Medication History Completed By: Ed Payne 12/25/2023 9:35 AM    PTA Med List   Medication Sig Last Dose    amLODIPine (NORVASC) 10 MG tablet Take 10 mg by mouth daily 12/24/2023 at am    aspirin 81 MG EC tablet Take 1 tablet (81 mg) by mouth daily 12/24/2023 at am    atorvastatin (LIPITOR) 40 MG tablet Take 1 tablet (40 mg) by mouth every evening 12/23/2023 at pm    clopidogrel (PLAVIX) 75 MG tablet Take 1 tablet (75 mg) by mouth daily for 180 days 12/24/2023 at am    lisinopril (ZESTRIL) 20 MG tablet Take 20 mg by mouth every evening 12/23/2023 at pm    metoprolol tartrate (LOPRESSOR) 50 MG tablet Take 50 mg by mouth daily 12/24/2023 at am    ondansetron (ZOFRAN ODT) 4 MG ODT tab Take 1 tablet (4 mg) by mouth every 6 hours as needed for nausea or vomiting  at prn    pantoprazole (PROTONIX) 40 MG EC tablet Take 1 tablet (40 mg) by mouth 2 times daily ; BID for 8 weeks, then daily. 12/24/2023 at am    sertraline (ZOLOFT) 50 MG tablet Take 50 mg by mouth every evening 12/23/2023 at pm    traMADol (ULTRAM) 50 MG tablet Take 0.5-1 tablets (25-50 mg) by mouth every 6 hours as needed for severe pain  at prn

## 2023-12-25 NOTE — CONSULTS
[facility]  Gastroenterology Consultation         Tammy Osorio  [address] [facility] [address]  78 year old female    Admission Date/Time: 12/25/2023  Primary Care Provider: Heriberto Jackson  Referring / Attending Physician:  Dr. Weems    We were asked to see the patient in consultation by Dr. Weems for evaluation of acute GI bleed with hemorrhagic shock.      CC: Acute GI bleed    HPI:  Tammy Osorio is a 78 year old female who is well-known to GI practice patient was recently hospitalized about 2 weeks ago patient was evaluated with upper GI endoscopy due to anemia patient was found to have extensive ulceration involving most of the duodenum and gastritis patient was diagnosed with significant vascular problem including stenosis of SMA and celiac artery causing ischemia leading to small bowel ulceration.  Patient was treated with stent placement and antiplatelet therapy no patient presented with acute onset of hematochezia patient initial hemoglobin was 3.5 on admission patient required multiple units of transfusion patient stabilized patient was admitted to ICU patient is hemodynamically stable patient is lying comfortably patient last hemoglobin is 6.  Patient has been on aspirin and Plavix which was held.  Patient is denying any other new significant systemic complaint patient with longstanding history of smoking.  Patient finding discussed in detail with patient's  and daughter.  Awake alert oriented comfortable    ROS: A comprehensive ten point review of systems was negative aside from those in mentioned in the HPI.      PAST MED HX:  I have reviewed this patient's medical history and updated it with pertinent information if needed.   Past Medical History:   Diagnosis Date    Anxiety     Aortic regurgitation     Ascending aortic aneurysm (H24)     Diabetes (H)     Hypertension     Osteopenia     Pelvic mass     Pulmonary emphysema (H)     Sciatica,  unspecified laterality        MEDICATIONS:   Prior to Admission Medications   Prescriptions Last Dose Informant Patient Reported? Taking?   amLODIPine (NORVASC) 10 MG tablet 12/24/2023 at am Self Yes Yes   Sig: Take 10 mg by mouth daily   aspirin 81 MG EC tablet 12/24/2023 at am Self No Yes   Sig: Take 1 tablet (81 mg) by mouth daily   atorvastatin (LIPITOR) 40 MG tablet 12/23/2023 at pm  No Yes   Sig: Take 1 tablet (40 mg) by mouth every evening   clopidogrel (PLAVIX) 75 MG tablet 12/24/2023 at am  No Yes   Sig: Take 1 tablet (75 mg) by mouth daily for 180 days   lisinopril (ZESTRIL) 20 MG tablet 12/23/2023 at pm Self Yes Yes   Sig: Take 20 mg by mouth every evening   metoprolol tartrate (LOPRESSOR) 50 MG tablet 12/24/2023 at am Self Yes Yes   Sig: Take 50 mg by mouth daily   ondansetron (ZOFRAN ODT) 4 MG ODT tab  at prn Self No Yes   Sig: Take 1 tablet (4 mg) by mouth every 6 hours as needed for nausea or vomiting   pantoprazole (PROTONIX) 40 MG EC tablet 12/24/2023 at am  No Yes   Sig: Take 1 tablet (40 mg) by mouth 2 times daily ; BID for 8 weeks, then daily.   sertraline (ZOLOFT) 50 MG tablet 12/23/2023 at pm Self Yes Yes   Sig: Take 50 mg by mouth every evening   traMADol (ULTRAM) 50 MG tablet  at prn Self No Yes   Sig: Take 0.5-1 tablets (25-50 mg) by mouth every 6 hours as needed for severe pain      Facility-Administered Medications: None       ALLERGIES:   Allergies   Allergen Reactions    Sulfa Antibiotics Swelling     Swelling of lips       SOCIAL HISTORY:  Social History     Tobacco Use    Smoking status: Every Day     Types: Cigarettes    Smokeless tobacco: Never   Vaping Use    Vaping Use: Never used   Substance Use Topics    Alcohol use: Yes     Comment: occassional    Drug use: Never       FAMILY HISTORY:  No family history on file.    PHYSICAL EXAM:   General awake alert oriented comfortable  Vital Signs with Ranges  Temp: 97  F (36.1  C) Temp src: Temporal BP: 119/43 Pulse: 93   Resp: 23 SpO2: 96  % O2 Device: Nasal cannula Oxygen Delivery: 2 LPM  No intake/output data recorded.    Cardiovascular: negative, PMI normal. No lifts, heaves, or thrills. RRR. No murmurs, clicks gallops or rub  Respiratory: negative, Percussion normal. Good diaphragmatic excursion. Lungs clear  Head: Normocephalic. No masses, lesions, tenderness or abnormalities  Neck: Neck supple. No adenopathy. Thyroid symmetric, normal size,, Carotids without bruits.  Abdomen: Abdomen soft, non-tender. BS normal. No masses, organomegaly  SKIN: no suspicious lesions or rashes          ADDITIONAL COMMENTS:   I reviewed the patient's new clinical lab test results.   Recent Labs   Lab Test 12/25/23  0928 12/25/23  0400 12/25/23  0315 12/22/23  0841 12/21/23  0810 12/20/23  1533   WBC  --  5.9 11.0 12.9*   < >  --    HGB 6.0* 5.8* 3.5* 9.0*   < >  --    MCV  --  89 95 93   < >  --    PLT  --  257 373 317   < >  --    INR  --  1.52*  --   --   --  1.09    < > = values in this interval not displayed.     Recent Labs   Lab Test 12/25/23  0400 12/25/23  0315 12/22/23  0841   POTASSIUM 4.8 5.2 3.9   CHLORIDE 106 104 103   CO2 20* 18* 23   BUN 25.7* 26.2* 15.1   ANIONGAP 9 13 10     Recent Labs   Lab Test 12/25/23 0315 12/18/23  1559   ALBUMIN 2.3* 3.4*   BILITOTAL 0.2 0.5   ALT 5 11   AST 15 21       I reviewed the patient's new imaging results.        CONSULTATION ASSESSMENT AND PLAN:    Principal Problem:    Gastrointestinal hemorrhage, unspecified gastrointestinal hemorrhage type    Assessment: Very pleasant 78-year-old female with known history of multiple duodenal ulcer currently on dual antiplatelet therapy with recent stent placement for significantly stenotic celiac and SMA arteries leading to ischemic ulcers in the duodenum patient's findings are quite worrisome for significant GI bleed from duodenal ulcer in the setting of dual antiplatelet therapy patient presented to ER with hemorrhagic shock patient is stabilized with blood transfusion  patient has required 4 units of blood patient's hemoglobin is 6 now.  Patient is hemodynamically stable.  Agree with the current plan to continue IV Protonix serial hemoglobin transfuse plan to proceed with urgent upper GI endoscopy.  If negative patient will need further evaluation with colonoscopy.  Risk-benefit plan discussed in detail with patient and family.  Thank you very much for letting me participate in her care.                Boaz Ortega MD, FACP  [facility].  Office: [phone]  Cell :       [facility]  Ph: [phone] Fax: [phone]                      78 year old female with a history of DM2, Htn, COPD, recent admission from 12/18-22 for acute anemia, duodenal ulcers and SMA stenosis who is admitted on 12/25/2023 with acute GI bleed and severe anemia   I/V Protonix  EGD this AM  NPO    Full consult dictated.    Boaz Ortega MD FACP

## 2023-12-25 NOTE — PROGRESS NOTES
Hospitalist update note:     Hemoglobin still 6.0 on last check requiring another unit of blood.  Patient underwent endoscopy by GI.  Bleeding ulcer noted.  Will continue to monitor and possibly resume DAPT as early as tomorrow if stable.  Possibly transfer out of ICU tomorrow if still stable       Rodger Hayes DO   Hospitalist

## 2023-12-26 LAB
ANION GAP SERPL CALCULATED.3IONS-SCNC: 8 MMOL/L (ref 7–15)
BUN SERPL-MCNC: 14.8 MG/DL (ref 8–23)
CALCIUM SERPL-MCNC: 7.9 MG/DL (ref 8.8–10.2)
CHLORIDE SERPL-SCNC: 107 MMOL/L (ref 98–107)
CREAT SERPL-MCNC: 0.59 MG/DL (ref 0.51–0.95)
DEPRECATED HCO3 PLAS-SCNC: 24 MMOL/L (ref 22–29)
EGFRCR SERPLBLD CKD-EPI 2021: >90 ML/MIN/1.73M2
ERYTHROCYTE [DISTWIDTH] IN BLOOD BY AUTOMATED COUNT: 14.5 % (ref 10–15)
GLUCOSE BLDC GLUCOMTR-MCNC: 147 MG/DL (ref 70–99)
GLUCOSE BLDC GLUCOMTR-MCNC: 149 MG/DL (ref 70–99)
GLUCOSE BLDC GLUCOMTR-MCNC: 157 MG/DL (ref 70–99)
GLUCOSE BLDC GLUCOMTR-MCNC: 206 MG/DL (ref 70–99)
GLUCOSE BLDC GLUCOMTR-MCNC: 209 MG/DL (ref 70–99)
GLUCOSE SERPL-MCNC: 153 MG/DL (ref 70–99)
HCT VFR BLD AUTO: 24.3 % (ref 35–47)
HGB BLD-MCNC: 7.8 G/DL (ref 11.7–15.7)
HGB BLD-MCNC: 8.2 G/DL (ref 11.7–15.7)
HGB BLD-MCNC: 8.2 G/DL (ref 11.7–15.7)
HGB BLD-MCNC: 9 G/DL (ref 11.7–15.7)
MCH RBC QN AUTO: 29.3 PG (ref 26.5–33)
MCHC RBC AUTO-ENTMCNC: 33.7 G/DL (ref 31.5–36.5)
MCV RBC AUTO: 87 FL (ref 78–100)
PLATELET # BLD AUTO: 321 10E3/UL (ref 150–450)
POTASSIUM SERPL-SCNC: 3.7 MMOL/L (ref 3.4–5.3)
RBC # BLD AUTO: 2.8 10E6/UL (ref 3.8–5.2)
SODIUM SERPL-SCNC: 139 MMOL/L (ref 135–145)
WBC # BLD AUTO: 9.5 10E3/UL (ref 4–11)

## 2023-12-26 PROCEDURE — 200N000001 HC R&B ICU

## 2023-12-26 PROCEDURE — 250N000013 HC RX MED GY IP 250 OP 250 PS 637: Performed by: INTERNAL MEDICINE

## 2023-12-26 PROCEDURE — 250N000013 HC RX MED GY IP 250 OP 250 PS 637: Performed by: SURGERY

## 2023-12-26 PROCEDURE — 99233 SBSQ HOSP IP/OBS HIGH 50: CPT | Performed by: INTERNAL MEDICINE

## 2023-12-26 PROCEDURE — 36415 COLL VENOUS BLD VENIPUNCTURE: CPT | Performed by: INTERNAL MEDICINE

## 2023-12-26 PROCEDURE — 999N000128 HC STATISTIC PERIPHERAL IV START W/O US GUIDANCE

## 2023-12-26 PROCEDURE — 85018 HEMOGLOBIN: CPT | Performed by: SURGERY

## 2023-12-26 PROCEDURE — C9113 INJ PANTOPRAZOLE SODIUM, VIA: HCPCS | Performed by: INTERNAL MEDICINE

## 2023-12-26 PROCEDURE — 80048 BASIC METABOLIC PNL TOTAL CA: CPT | Performed by: INTERNAL MEDICINE

## 2023-12-26 PROCEDURE — 85027 COMPLETE CBC AUTOMATED: CPT | Performed by: INTERNAL MEDICINE

## 2023-12-26 PROCEDURE — 99232 SBSQ HOSP IP/OBS MODERATE 35: CPT | Mod: GC | Performed by: SURGERY

## 2023-12-26 PROCEDURE — 250N000011 HC RX IP 250 OP 636: Performed by: INTERNAL MEDICINE

## 2023-12-26 PROCEDURE — 36415 COLL VENOUS BLD VENIPUNCTURE: CPT | Performed by: SURGERY

## 2023-12-26 RX ORDER — NICOTINE POLACRILEX 4 MG
15-30 LOZENGE BUCCAL
Status: DISCONTINUED | OUTPATIENT
Start: 2023-12-26 | End: 2023-12-28 | Stop reason: HOSPADM

## 2023-12-26 RX ORDER — METOPROLOL TARTRATE 50 MG
50 TABLET ORAL DAILY
Status: DISCONTINUED | OUTPATIENT
Start: 2023-12-26 | End: 2023-12-28 | Stop reason: HOSPADM

## 2023-12-26 RX ORDER — PANTOPRAZOLE SODIUM 40 MG/1
40 TABLET, DELAYED RELEASE ORAL
Status: DISCONTINUED | OUTPATIENT
Start: 2023-12-26 | End: 2023-12-28 | Stop reason: HOSPADM

## 2023-12-26 RX ORDER — ASPIRIN 81 MG/1
81 TABLET, CHEWABLE ORAL DAILY
Status: DISCONTINUED | OUTPATIENT
Start: 2023-12-26 | End: 2023-12-28 | Stop reason: HOSPADM

## 2023-12-26 RX ORDER — PANTOPRAZOLE SODIUM 40 MG/1
40 TABLET, DELAYED RELEASE ORAL
Status: DISCONTINUED | OUTPATIENT
Start: 2023-12-26 | End: 2023-12-26

## 2023-12-26 RX ORDER — DEXTROSE MONOHYDRATE 25 G/50ML
25-50 INJECTION, SOLUTION INTRAVENOUS
Status: DISCONTINUED | OUTPATIENT
Start: 2023-12-26 | End: 2023-12-28 | Stop reason: HOSPADM

## 2023-12-26 RX ADMIN — INSULIN ASPART 1 UNITS: 100 INJECTION, SOLUTION INTRAVENOUS; SUBCUTANEOUS at 07:59

## 2023-12-26 RX ADMIN — METOPROLOL TARTRATE 50 MG: 50 TABLET, FILM COATED ORAL at 11:47

## 2023-12-26 RX ADMIN — ASPIRIN 81 MG CHEWABLE TABLET 81 MG: 81 TABLET CHEWABLE at 10:09

## 2023-12-26 RX ADMIN — PANTOPRAZOLE SODIUM 40 MG: 40 INJECTION, POWDER, FOR SOLUTION INTRAVENOUS at 08:02

## 2023-12-26 RX ADMIN — SERTRALINE HYDROCHLORIDE 50 MG: 50 TABLET ORAL at 20:00

## 2023-12-26 RX ADMIN — INSULIN ASPART 1 UNITS: 100 INJECTION, SOLUTION INTRAVENOUS; SUBCUTANEOUS at 04:46

## 2023-12-26 RX ADMIN — PANTOPRAZOLE SODIUM 40 MG: 40 TABLET, DELAYED RELEASE ORAL at 16:42

## 2023-12-26 ASSESSMENT — ACTIVITIES OF DAILY LIVING (ADL)
ADLS_ACUITY_SCORE: 45
ADLS_ACUITY_SCORE: 49
ADLS_ACUITY_SCORE: 43
ADLS_ACUITY_SCORE: 43
ADLS_ACUITY_SCORE: 54
ADLS_ACUITY_SCORE: 43
DEPENDENT_IADLS:: INDEPENDENT
ADLS_ACUITY_SCORE: 43
ADLS_ACUITY_SCORE: 43

## 2023-12-26 NOTE — PLAN OF CARE
Problem: Gastrointestinal Bleeding  Goal: Hemostasis  Intervention: Manage Gastrointestinal Bleeding  Recent Flowsheet Documentation  Taken 12/25/2023 1600 by Debby Norris RN  Bleeding Management: blood products administered     Problem: Adult Inpatient Plan of Care  Goal: Plan of Care Review  Description: The Plan of Care Review/Shift note should be completed every shift.  The Outcome Evaluation is a brief statement about your assessment that the patient is improving, declining, or no change.  This information will be displayed automatically on your shift  note.  Outcome: Progressing  Flowsheets (Taken 12/25/2023 1811)  Outcome Evaluation:   Pt alert and cooperative, denies pain. EDG this morning, see previous notes. Hgb improving, but not yet above 7. 1u PRBC transfusing currently. Frequently stooling since noon, maroon. Unmeansured voids with stool. Weakness improving, but hands weak   unable to manipulate spoon for ice chips but able to hold cup for sips of water.  Plan of Care Reviewed With: patient   Goal Outcome Evaluation:      Plan of Care Reviewed With: patient          Outcome Evaluation: Pt alert and cooperative, denies pain. EDG this morning, see previous notes. Hgb improving, but not yet above 7. 1u PRBC transfusing currently. Frequently stooling since noon, maroon. Unmeansured voids with stool. Weakness improving, but hands weak; unable to manipulate spoon for ice chips but able to hold cup for sips of water.

## 2023-12-26 NOTE — PROGRESS NOTES
Vascular Surgery Progress Note    Subjective:  NAEON. S/p EGD with bleeding ulcers in the duo.    Objective:    *Vitals, labs, intake and output, pertinent imaging, and other pertinent studies were reviewed*    Gen: NAD  HEENT: NCAT  Vascular: palpable left radial pulse  Wound: left arm incision c/d/I  Neuro: Moving all extremities  Psych: Cooperative    Assessment and Plan:  78 year old female s/p SMA stenting for ischemic ulcer of the gastric antrum and duodenum and likely chronic mesenteric ischemia last week re admitted for bleeding.    - Hgb stable now  - If hgb stable, please resume aspirin at least  - OK for holding plavix for now with severe bleeding  - CTM for bleeding    Linda Clark MD  Vascular Surgery Fellow        BP (!) 152/57   Pulse 78   Temp 98.6  F (37  C) (Oral)   Resp 16   Wt 51.6 kg (113 lb 12.1 oz)   SpO2 91%   BMI 18.36 kg/m       Intake/Output Summary (Last 24 hours) at 12/26/2023 0739  Last data filed at 12/26/2023 0400  Gross per 24 hour   Intake 595 ml   Output 600 ml   Net -5 ml          Linda Clark MD  (Vascular Fellow)    STAFF: Still in ICU.  Patient examined this morning.  She is quite comfortable.   Lost IV access this morning.  This is being evaluated.   Appreciate Dr. Ortega help yesterday.  EGD did reveal a bleeding duodenal ulcers.  They were cauterized and injected with good hemostasis.    Recommended clear liquid diet and Protonix.  May restart aspirin/Plavix if remains stable.    Hemoglobins have been very stable will check hemoglobins every 8 hours    .  Will initially start baby aspirin and hold on Plavix for right now.    By CT scan patient has a fairly large left pleural effusion with atelectasis.  Aggressive pulmonary toilet with incentive spirometer indicated.    Abner Parekh MD

## 2023-12-26 NOTE — CONSULTS
Care Management Initial Consult    General Information  Assessment completed with: Patient, Tammy  Type of CM/SW Visit: Initial Assessment    Primary Care Provider verified and updated as needed: Yes   Readmission within the last 30 days: current reason for admission unrelated to previous admission      Reason for Consult: discharge planning, other (see comments) (Elevated Risk, [facility])  Advance Care Planning: Advance Care Planning Reviewed: verified with patient, no concerns identified          Communication Assessment  Patient's communication style: spoken language (English or Bilingual)             Cognitive  Cognitive/Neuro/Behavioral: WDL                      Living Environment:   People in home: spouse, child(kt), adult  Peter, and Daughter  Current living Arrangements: house      Able to return to prior arrangements: yes       Family/Social Support:  Care provided by: self, spouse/significant other, child(kt), friend  Provides care for: no one  Marital Status:   , Children  Rafael       Description of Support System: Supportive, Involved    Support Assessment: Adequate family and caregiver support    Current Resources:   Patient receiving home care services: Yes  Skilled Home Care Services: Skilled Nursing  Community Resources: None  Equipment currently used at home:    Supplies currently used at home: None    Employment/Financial:  Employment Status: retired        Financial Concerns: none   Referral to Financial Worker: No       Does the patient's insurance plan have a 3 day qualifying hospital stay waiver?  No    Lifestyle & Psychosocial Needs:  Social Determinants of Health     Food Insecurity: Not on file   Depression: Not on file   Housing Stability: Not on file   Tobacco Use: High Risk (12/21/2023)    Patient History     Smoking Tobacco Use: Every Day     Smokeless Tobacco Use: Never     Passive Exposure: Not on file   Financial Resource Strain: Not on file   Alcohol Use: Not on file    Transportation Needs: Not on file   Physical Activity: Not on file   Interpersonal Safety: Not on file   Stress: Not on file   Social Connections: Not on file       Functional Status:  Prior to admission patient needed assistance:   Dependent ADLs:: Independent  Dependent IADLs:: Independent  Assesssment of Functional Status: Not at  functional baseline    Mental Health Status:          Chemical Dependency Status:                Values/Beliefs:  Spiritual, Cultural Beliefs, Latter day Practices, Values that affect care: no               Additional Information:  Writer met with Pt at bedside and introduced self and role in discharge planning. Pt lives with spouse and daughter in a house. Pt is independent with all ALD's. Pt has a walker but doesn't use it. Pt is still driving. Pt is currently getting HHC RN through [facility]. Anticipate Pt to return home with resumption of HHC. Family will transport at discharge.      Layo Baker, RN, BSN, Care Coordinator

## 2023-12-26 NOTE — PROGRESS NOTES
[facility]    Patient is currently receiving services with [facility]. The patient is currently receiving Skilled Nursing services.  Patient's  and home health team have been notified that patient is under inpatient status [facility] Liaison will continue to follow patient during stay. Please provide orders to resume home care at time of discharge if appropriate.

## 2023-12-26 NOTE — PLAN OF CARE
Goal Outcome Evaluation:      Plan of Care Reviewed With: patient    Overall Patient Progress: improvingOverall Patient Progress: improving    Outcome Evaluation: Pt A/Ox4, VSS on RA, tele SR, normotensive. One small smear dark red bm, voiding appropriately in purewick. Hgb checking q6h, stable and no product given overnight.      Problem: Gastrointestinal Bleeding  Goal: Hemostasis  Intervention: Manage Gastrointestinal Bleeding  Recent Flowsheet Documentation  Taken 12/26/2023 0400 by Amairani Grigsby RN  Environmental Support:   calm environment promoted   rest periods encouraged   environmental consistency promoted  Taken 12/26/2023 0000 by Amairani Grigsby RN  Environmental Support:   calm environment promoted   rest periods encouraged   environmental consistency promoted     Problem: Adult Inpatient Plan of Care  Goal: Optimal Comfort and Wellbeing  Intervention: Provide Person-Centered Care  Recent Flowsheet Documentation  Taken 12/26/2023 0400 by Amairani Grigsby RN  Trust Relationship/Rapport:   care explained   choices provided   emotional support provided   empathic listening provided   questions answered   questions encouraged   reassurance provided   thoughts/feelings acknowledged  Taken 12/26/2023 0000 by Amairani Grigsby RN  Trust Relationship/Rapport:   care explained   choices provided   emotional support provided   empathic listening provided   questions answered   questions encouraged   reassurance provided   thoughts/feelings acknowledged

## 2023-12-26 NOTE — PROGRESS NOTES
Multiple flying squad Rns and CRNA attempted to place PIV on pt after both of hers were dislodged/leaking and were unsuccessful after many attempts. Will place a vascular access consult STAT for 0700 as patient is currently without IV access.

## 2023-12-26 NOTE — PROGRESS NOTES
[facility]  Gastroenterology Progress Note     Tammy Osorio MRN# [id number]   YOB: 1945 Age: 78 year old          Assessment and Plan:     Tammy Osorio is a 78 year old female with a history of DM2, Htn, COPD, recent admission from 12/18-22 for acute anemia, duodenal ulcers and SMA stenosis who is admitted on 12/25/2023 with acute GI bleed and severe anemia.     Gastrointestinal hemorrhage, unspecified gastrointestinal hemorrhage type  Gastric erosions  Duodenal ulcers  12/25 EGD noted clotted blood in gastric antrum, gastric erosions with no bleeding   Hemoglobin 8.2 and stable. Was 3.5 day of admission  Pressures stable  Newly started on DAPT after SMA stent for severe SMA stenosis    - BID pantorpazole 40 mg   - daily hemoglobin  - advance to full liquid diet  - ok with GI to transfer out of ICU  - continue aspirin and may restart Plavix- risk of recurrent GI bleed is high but can continue to monitor for signs of recurrent GI bleed           Interval History:     no new complaints, doing well, and doing well; no cp, sob, n/v/d, or abd pain.              Review of Systems:     C: NEGATIVE for fever, chills, change in weight  E/M: NEGATIVE for ear, mouth and throat problems  R: NEGATIVE for significant cough or SOB  CV: NEGATIVE for chest pain, palpitations or peripheral edema             Medications:   I have reviewed this patient's current medications   aspirin  81 mg Oral Daily    insulin aspart  1-6 Units Subcutaneous Q4H    pantoprazole  40 mg Intravenous BID AC    Or    pantoprazole  40 mg Oral BID AC                  Physical Exam:   Vitals were reviewed  Vital Signs with Ranges  Temp:  [97.4  F (36.3  C)-98.9  F (37.2  C)] 98.2  F (36.8  C)  Pulse:  [] 78  Resp:  [13-40] 16  BP: (115-163)/(41-69) 152/57  SpO2:  [90 %-98 %] 91 %  I/O last 3 completed shifts:  In: 595   Out: 600 [Urine:600]  Constitutional: healthy, alert, and no distress    Cardiovascular: negative, PMI normal. No lifts, heaves, or thrills. RRR. No murmurs, clicks gallops or rub  Respiratory: negative, Percussion normal. Good diaphragmatic excursion. Lungs clear  Abdomen: Abdomen soft, non-tender. BS normal. No masses, organomegaly             Data:   I reviewed the patient's new clinical lab test results.   Recent Labs   Lab Test 12/26/23  0608 12/25/23  2346 12/25/23  1901 12/25/23  0928 12/25/23  0400 12/25/23  0315 12/21/23  0810 12/20/23  1533   WBC 9.5  --   --   --  5.9 11.0   < >  --    HGB 8.2*  8.2* 7.8* 8.1*   < > 5.8* 3.5*   < >  --    MCV 87  --   --   --  89 95   < >  --      --   --   --  257 373   < >  --    INR  --   --   --   --  1.52*  --   --  1.09    < > = values in this interval not displayed.     Recent Labs   Lab Test 12/26/23  0608 12/25/23  0400 12/25/23  0315   POTASSIUM 3.7 4.8 5.2   CHLORIDE 107 106 104   CO2 24 20* 18*   BUN 14.8 25.7* 26.2*   ANIONGAP 8 9 13     Recent Labs   Lab Test 12/25/23  0315 12/18/23  1559   ALBUMIN 2.3* 3.4*   BILITOTAL 0.2 0.5   ALT 5 11   AST 15 21       I reviewed the patient's new imaging results.    All laboratory data reviewed  All imaging studies reviewed by me.    Luz Soto PA-C,  12/26/2023  [facility]  Office : [phone]  Cell: [phone] (Dr. Ortega)  Cell: [phone] (Luz Soto PA-C)

## 2023-12-26 NOTE — PROGRESS NOTES
[facility]    Medicine Progress Note - Hospitalist Service    Date of Admission:  12/25/2023    Assessment & Plan   Tammy Osorio is a 78 year old female with a history of DM2, Htn, COPD, recent admission from 12/18-22 for acute anemia, duodenal ulcers and SMA stenosis who is admitted on 12/25/2023 with acute GI bleed and severe anemia.      Acute GI bleed 2/2 duodenal ulcers and gastric erosions   Severe, acute blood loss anemia  Possible hemorrhagic shock  Patient presents with hgb of 3.5 after recent admission from 12/18-22 for hgb of 5.0 and findings of multiple, large, cratered duodenal ulcers, large at 20mm. Also newly started on ASA and plavix after placement of SMA stent for severe SMA stenosis. Acute GI bleed likely a combination of ASA/plavix and reperfusion of SMA. Has intermittently had SBP in the 50-70s but is responding to massive transfusion protocol with 4 units PRBCs, platelets and FFP.   * EGD:  Normal esophagus. Clotted blood in the gastric antrum.  Gastric erosions with no bleeding and no stigmata of recent bleeding.  Oozing duodenal ulcers with a visible vessel. Spurting duodenal ulcer with a visible vessel.  Treated with bipolar cautery. Injected. Blood in the entire examined duodenum.   - Transfer out of ICU   - Hemoglobins have been stable and backing off to daily checks  - Transfuse for hgb <7, conditional orders placed.  Has been transfused 8 units of PRBCs total, platelets and FFP   - IV protonix BID  - ASA had been on hold and resuming today  - Continue holding Plavix  - GI following and appreciate their recommendations.  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 Seen by vascular  surgery during previous hospitalization in December, SMA stent placed.   - Vascular surgery consulted and appreciate their recommendations.  Start ASA as soon as able but can continue holding Plavix     HTN   HLP   - Resumed PTA Lopressor.  Holding other PTA anti-hypertensives due to borderline blood pressures on admission.  Resume as needed   - Holding statin.  Likely resume tomorrow      DM type II   Recently taken of metformin per the . Hgb A1C of 5.9 in December 2023.   - Switched medium intensity SSI to meal time     Anxiety  - Resumed PTA zoloft      Left adnexal mass s/p ex lap, bilateral salpingo-oophorectomy, and pelvic washings with removal of mass (11/27/2023).  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  - Follow up with GYN onc as scheduled               Diet: Full Liquid Diet    DVT Prophylaxis: Pneumatic Compression Devices  Andres Catheter: Not present  Lines: None     Cardiac Monitoring: None  Code Status: Full Code      Clinically Significant Risk Factors          # Hypocalcemia: Lowest iCa = 3.7 mg/dL in last 2 days, will monitor and replace as appropriate     # Hypoalbuminemia: Lowest albumin = 2.3 g/dL at 12/25/2023  3:15 AM, will monitor as appropriate  # Coagulation Defect: INR = 1.52 (Ref range: 0.85 - 1.15) and/or PTT = 27 Seconds (Ref range: 22 - 38 Seconds), will monitor for bleeding    # Hypertension: Noted on problem list            # Financial/Environmental Concerns:           Disposition Plan     Expected Discharge Date: 12/27/2023                    Rodger Hayes DO  Hospitalist Service  [facility]  Securely message with [employer] (more info)  Text page via [employer] ([place]) Paging/Directory   ______________________________________________________________________    Interval History   Patient seen and examined.  No acute events over night.  Tolerated endoscopy well.  Now on clear liquid diet and tolerating this.  No fevers or hypoxia  noted.  No nausea or vomiting.  No pain currently.     Physical Exam   Vital Signs: Temp: 98.2  F (36.8  C) Temp src: Oral BP: (!) 152/57 Pulse: 78   Resp: 16 SpO2: 91 % O2 Device: None (Room air) Oxygen Delivery: 2 LPM  Weight: 113 lbs 12.12 oz    General Appearance: Resting comfortably. NAD  Respiratory: Clear to auscultation.  No respiratory distress  Cardiovascular: RRR.  No obvious murmurs  GI: Soft.  Non-distended  Skin: No obvious rashes or cyanosis  Other: Alert.  No edema      Medical Decision Making       55 MINUTES SPENT BY ME on the date of service doing chart review, history, exam, documentation & further activities per the note.      Data   ------------------------- PAST 24 HR DATA REVIEWED -----------------------------------------------    I have personally reviewed the following data over the past 24 hrs:    9.5  \   8.2 (L); 8.2 (L)   / 321     139 107 14.8 /  157 (H)   3.7 24 0.59 \       Imaging results reviewed over the past 24 hrs:   No results found for this or any previous visit (from the past 24 hour(s)).

## 2023-12-27 ENCOUNTER — APPOINTMENT (OUTPATIENT)
Dept: PHYSICAL THERAPY | Facility: CLINIC | Age: 78
DRG: 377 | End: 2023-12-27
Attending: INTERNAL MEDICINE
Payer: MEDICARE

## 2023-12-27 LAB
GLUCOSE BLDC GLUCOMTR-MCNC: 153 MG/DL (ref 70–99)
GLUCOSE BLDC GLUCOMTR-MCNC: 194 MG/DL (ref 70–99)
GLUCOSE BLDC GLUCOMTR-MCNC: 228 MG/DL (ref 70–99)
GLUCOSE BLDC GLUCOMTR-MCNC: 255 MG/DL (ref 70–99)
HGB BLD-MCNC: 9.1 G/DL (ref 11.7–15.7)
HGB BLD-MCNC: 9.3 G/DL (ref 11.7–15.7)
HGB BLD-MCNC: 9.3 G/DL (ref 11.7–15.7)
HGB BLD-MCNC: 9.5 G/DL (ref 11.7–15.7)

## 2023-12-27 PROCEDURE — 99232 SBSQ HOSP IP/OBS MODERATE 35: CPT | Mod: GC | Performed by: SURGERY

## 2023-12-27 PROCEDURE — 250N000013 HC RX MED GY IP 250 OP 250 PS 637: Performed by: INTERNAL MEDICINE

## 2023-12-27 PROCEDURE — 85018 HEMOGLOBIN: CPT | Performed by: SURGERY

## 2023-12-27 PROCEDURE — 120N000001 HC R&B MED SURG/OB

## 2023-12-27 PROCEDURE — 97116 GAIT TRAINING THERAPY: CPT | Mod: GP | Performed by: PHYSICAL THERAPIST

## 2023-12-27 PROCEDURE — 36415 COLL VENOUS BLD VENIPUNCTURE: CPT | Performed by: SURGERY

## 2023-12-27 PROCEDURE — 97161 PT EVAL LOW COMPLEX 20 MIN: CPT | Mod: GP | Performed by: PHYSICAL THERAPIST

## 2023-12-27 PROCEDURE — 97530 THERAPEUTIC ACTIVITIES: CPT | Mod: GP | Performed by: PHYSICAL THERAPIST

## 2023-12-27 PROCEDURE — 250N000013 HC RX MED GY IP 250 OP 250 PS 637: Performed by: SURGERY

## 2023-12-27 PROCEDURE — 99232 SBSQ HOSP IP/OBS MODERATE 35: CPT | Performed by: INTERNAL MEDICINE

## 2023-12-27 RX ORDER — CLOPIDOGREL BISULFATE 75 MG/1
75 TABLET ORAL DAILY
Status: DISCONTINUED | OUTPATIENT
Start: 2023-12-27 | End: 2023-12-28 | Stop reason: HOSPADM

## 2023-12-27 RX ADMIN — CLOPIDOGREL BISULFATE 75 MG: 75 TABLET ORAL at 16:06

## 2023-12-27 RX ADMIN — ASPIRIN 81 MG CHEWABLE TABLET 81 MG: 81 TABLET CHEWABLE at 10:54

## 2023-12-27 RX ADMIN — PANTOPRAZOLE SODIUM 40 MG: 40 TABLET, DELAYED RELEASE ORAL at 07:06

## 2023-12-27 RX ADMIN — METOPROLOL TARTRATE 50 MG: 50 TABLET, FILM COATED ORAL at 10:54

## 2023-12-27 RX ADMIN — SERTRALINE HYDROCHLORIDE 50 MG: 50 TABLET ORAL at 21:11

## 2023-12-27 RX ADMIN — PANTOPRAZOLE SODIUM 40 MG: 40 TABLET, DELAYED RELEASE ORAL at 16:06

## 2023-12-27 ASSESSMENT — ACTIVITIES OF DAILY LIVING (ADL)
ADLS_ACUITY_SCORE: 34

## 2023-12-27 NOTE — PROGRESS NOTES
"   12/27/23 1212   Appointment Info   Signing Clinician's Name / Credentials (PT) Tammi Nguyen PT, DPT   Living Environment   People in Home spouse   Current Living Arrangements house   Home Accessibility stairs within home;stairs to enter home   Number of Stairs, Main Entrance 7   Stair Railings, Main Entrance railings on both sides of stairs   Transportation Anticipated family or friend will provide   Living Environment Comments 2+ 5 stairs, once in can stay on one floor   Self-Care   Usual Activity Tolerance moderate   Current Activity Tolerance fair   Regular Exercise No   Equipment Currently Used at Home   (Pt reprots she has a grab bar in her bathoom, and she has a walker for use prn. She disch home last admit with a walker.)   Fall history within last six months yes   Number of times patient has fallen within last six months 1   Activity/Exercise/Self-Care Comment Pt is \"normally\" completely independent with all mobiltiy, ADLs and IADLs. Has had difficulty as of late post GI bleeds and has had some help from family the last few weeks.   General Information   Onset of Illness/Injury or Date of Surgery 12/25/23   Referring Physician Rafael Kaminski MD   Patient/Family Therapy Goals Statement (PT) Eager to return.   Pertinent History of Current Problem (include personal factors and/or comorbidities that impact the POC) 78 year old female s/p SMA stenting for ischemic ulcer of the gastric antrum and duodenum and likely chronic mesenteric ischemia last week re admitted for bleeding.   Existing Precautions/Restrictions abdominal;fall   Cognition   Affect/Mental Status (Cognition) WNL   Orientation Status (Cognition) oriented x 4   Follows Commands (Cognition) WNL   Pain Assessment   Patient Currently in Pain No   Integumentary/Edema   Integumentary/Edema Comments Pt with copious \"clots\" and dark bloody drainage present. Per pt MDs assure her this is \"left over\" from her bleed and it will take days to " "\"exit\" her body.   Range of Motion (ROM)   ROM Comment B LEs and UEs WFL   Strength (Manual Muscle Testing)   Strength Comments Demonstrates antigravity strngth, but does fatigue easily. Admits to feeling weak and states \"I don't feel safe pushing it yet today.\"   Bed Mobility   Comment, (Bed Mobility) Sup>sit CGA.   Transfers   Comment, (Transfers) Sit>stand Min A.   Gait/Stairs (Locomotion)   Comment, (Gait/Stairs) Ambulated at bedside iwth CGA, use of WW.   Balance   Balance Comments Sitting balane fair +, needs time to acclimate, a little \"light,\" in the head. Slight sway noted with static sit and stand. Dyanmic balance /gait unsteady without acute LOB.   Sensory Examination   Sensory Perception Comments Denies nt/   Clinical Impression   Criteria for Skilled Therapeutic Intervention Yes, treatment indicated   PT Diagnosis (PT) Impaired functional activity tolerance   Influenced by the following impairments Weakness,fatigue, imparied balance   Functional limitations due to impairments Decreased functional independence with mobiltiy   Clinical Presentation (PT Evaluation Complexity) stable   Clinical Presentation Rationale see MR   Clinical Decision Making (Complexity) low complexity   Planned Therapy Interventions (PT) balance training;bed mobility training;gait training;home exercise program;neuromuscular re-education;patient/family education;ROM (range of motion);stair training;strengthening;stretching;transfer training;progressive activity/exercise;risk factor education;home program guidelines   Risk & Benefits of therapy have been explained evaluation/treatment results reviewed;care plan/treatment goals reviewed;risks/benefits reviewed;current/potential barriers reviewed;participants voiced agreement with care plan;participants included;patient   PT Total Evaluation Time   PT Eval, Low Complexity Minutes (73282) 10   Physical Therapy Goals   PT Frequency Daily   PT Predicted Duration/Target Date for Goal " "Attainment 12/30/23   PT Goals Bed Mobility;Transfers;Gait;Stairs   PT: Bed Mobility Independent;Supine to/from sit;Rolling   PT: Transfers Modified independent;Sit to/from stand;Bed to/from chair;Assistive device   PT: Gait Supervision/stand-by assist;Assistive device;Greater than 200 feet   PT: Stairs Supervision/stand-by assist;5 stairs;Rail on both sides   Interventions   Interventions Quick Adds Gait Training;Therapeutic Activity;Therapeutic Procedure   Therapeutic Procedure/Exercise   Treatment Detail/Skilled Intervention Edu pt on use of LE AROM for ciruclaton and reduced stiffness. Pt completed LaQs, March and AP s in sitting x 5 reps.   Therapeutic Activity   Therapeutic Activities: dynamic activities to improve functional performance Minutes (24429) 15   Treatment Detail/Skilled Intervention Edu pt on benefit of OOB, walks during hospital stay. Pt agrees, eager to be OOB. Pt with discharge present, therefore, cares before OOB. Pt states she can't tell where or what she needs to clean up, requested assist (bloody GI drainage, dark/black in color-RN aware, this is \"normal\" post GI bleed.) Max A for cares. Dangled x 5 min to acclimiate, VS assessed and satble. Min A to don pullup/brief; threaded in sitting, the stood to pull up-Min A. sit<>stand and saeated scoot in recliner with verbal cues for weight shift, tactile cues to initate. Up in recliner, all needs in reach, alamr on at PT exit.   Gait Training   Gait Training Minutes (93721) 10   Treatment Detail/Skilled Intervention Gait training inlcuded visual deom of walker use and safety, height adjustment and line management to accomodate. Ambulated 20' x 2 with CGA at gait belt. STanding rest x 2 secondary to fatigue, admits fearful as she \"fell gracefully,even with a pillow,\" just prior to admit. Edu pt on short duration and high frequency gait for improved tolerance,mechanics and confidnece.   PT Discharge Planning   PT Plan Gait in wright, stair " negotiation, monitor Hgb. Today 9.3.   PT Discharge Recommendation (DC Rec) home with assist   PT Rationale for DC Rec Pt would like to return home and resume her nursing cares that were present prior to this admit. Her  is home and will be wtih her most of the day, daughter is also highly involved in her care.   PT Brief overview of current status Min A or less with bedside tranfers and gait with walker.   Total Session Time   Timed Code Treatment Minutes 25   Total Session Time (sum of timed and untimed services) 35

## 2023-12-27 NOTE — PLAN OF CARE
"  Problem: Adult Inpatient Plan of Care  Goal: Plan of Care Review  Description: The Plan of Care Review/Shift note should be completed every shift.  The Outcome Evaluation is a brief statement about your assessment that the patient is improving, declining, or no change.  This information will be displayed automatically on your shift  note.  Outcome: Progressing  Flowsheets (Taken 12/26/2023 2001)  Outcome Evaluation: Patient adequate for transfer out of ICU to medical floor. Vitals WDL, neurologically intact. Patient still has melena, though hemoglobin improved. Sat in the chair for 5 hours, ambulated in room. Ate two meals, good apetite, on full liquid diet at this time. Spouse and daughter updated at bedside.  Plan of Care Reviewed With:   patient   spouse  Overall Patient Progress: improving  Goal: Patient-Specific Goal (Individualized)  Description: You can add care plan individualizations to a care plan. Examples of Individualization might be:  \"Parent requests to be called daily at 9am for status\", \"I have a hard time hearing out of my right ear\", or \"Do not touch me to wake me up as it startles  me\".  Outcome: Progressing  Goal: Absence of Hospital-Acquired Illness or Injury  Outcome: Progressing  Intervention: Identify and Manage Fall Risk  Recent Flowsheet Documentation  Taken 12/26/2023 1600 by Gus Negron, RN  Safety Promotion/Fall Prevention:   clutter free environment maintained   lighting adjusted   safety round/check completed   nonskid shoes/slippers when out of bed   assistive device/personal items within reach   activity supervised   increased rounding and observation   increase visualization of patient   room door open   room near nurse's station   patient and family education   room organization consistent   supervised activity   treat reversible contributory factors   treat underlying cause  Taken 12/26/2023 1200 by Gus Negron, RN  Safety Promotion/Fall Prevention:   clutter free " environment maintained   lighting adjusted   safety round/check completed   nonskid shoes/slippers when out of bed   assistive device/personal items within reach   activity supervised   increased rounding and observation   increase visualization of patient   room door open   room near nurse's station   patient and family education   room organization consistent   supervised activity   treat reversible contributory factors   treat underlying cause  Taken 12/26/2023 0800 by Gus Negron, RN  Safety Promotion/Fall Prevention:   clutter free environment maintained   lighting adjusted   safety round/check completed   nonskid shoes/slippers when out of bed   assistive device/personal items within reach   activity supervised   increased rounding and observation   increase visualization of patient   room door open   room near nurse's station   patient and family education   room organization consistent   supervised activity   treat reversible contributory factors   treat underlying cause  Intervention: Prevent Skin Injury  Recent Flowsheet Documentation  Taken 12/26/2023 1800 by Gus Negron, RN  Body Position:   turned   side-lying   upper extremity elevated   lower extremity elevated   heels elevated   position changed independently  Taken 12/26/2023 1600 by Gus Negron, RN  Body Position:   turned   side-lying   upper extremity elevated   lower extremity elevated   heels elevated   position changed independently  Skin Protection:   incontinence pads utilized   silicone foam dressing in place  Device Skin Pressure Protection:   pressure points protected   skin-to-device areas padded   positioning supports utilized  Taken 12/26/2023 1400 by Gus eNgron, RN  Body Position:   turned   weight shifting   upper extremity elevated   lower extremity elevated   heels elevated  Taken 12/26/2023 1200 by Gus Negron, RN  Body Position:   turned   weight shifting   upper extremity elevated   lower extremity  elevated   heels elevated  Skin Protection:   incontinence pads utilized   silicone foam dressing in place  Device Skin Pressure Protection:   pressure points protected   skin-to-device areas padded   positioning supports utilized  Taken 12/26/2023 1100 by Gus Negron RN  Body Position:   turned   weight shifting   upper extremity elevated   lower extremity elevated   heels elevated  Taken 12/26/2023 1000 by Gus Negron RN  Body Position:   turned   side-lying   upper extremity elevated   lower extremity elevated   heels elevated   position changed independently  Taken 12/26/2023 0800 by Gus Negron RN  Body Position:   turned   side-lying   upper extremity elevated   lower extremity elevated   heels elevated   position changed independently  Skin Protection:   incontinence pads utilized   silicone foam dressing in place  Device Skin Pressure Protection:   pressure points protected   skin-to-device areas padded   positioning supports utilized  Intervention: Prevent and Manage VTE (Venous Thromboembolism) Risk  Recent Flowsheet Documentation  Taken 12/26/2023 1600 by Gus Negron RN  VTE Prevention/Management: SCDs (sequential compression devices) off  Taken 12/26/2023 1200 by Gus Negron RN  VTE Prevention/Management: SCDs (sequential compression devices) off  Taken 12/26/2023 0800 by Gus Negron RN  VTE Prevention/Management: SCDs (sequential compression devices) off  Intervention: Prevent Infection  Recent Flowsheet Documentation  Taken 12/26/2023 1600 by Gus Negron RN  Infection Prevention:   rest/sleep promoted   single patient room provided  Taken 12/26/2023 1200 by Gus Negron RN  Infection Prevention:   rest/sleep promoted   single patient room provided  Taken 12/26/2023 0800 by Gus Negron RN  Infection Prevention:   rest/sleep promoted   single patient room provided  Goal: Optimal Comfort and Wellbeing  Outcome: Progressing  Intervention: Monitor Pain and Promote  Comfort  Recent Flowsheet Documentation  Taken 12/26/2023 1200 by Gus Negron, RN  Pain Management Interventions:   rest   repositioned   relaxation techniques promoted   quiet environment facilitated   pillow support provided   care clustered  Taken 12/26/2023 0800 by Gus Ngeron, RN  Pain Management Interventions:   rest   repositioned   relaxation techniques promoted   quiet environment facilitated   pillow support provided   care clustered  Intervention: Provide Person-Centered Care  Recent Flowsheet Documentation  Taken 12/26/2023 1600 by Gus Negron, RN  Trust Relationship/Rapport:   care explained   choices provided   emotional support provided   empathic listening provided   questions answered   questions encouraged   reassurance provided   thoughts/feelings acknowledged  Taken 12/26/2023 1200 by Gus Negron, RN  Trust Relationship/Rapport:   care explained   choices provided   emotional support provided   empathic listening provided   questions answered   questions encouraged   reassurance provided   thoughts/feelings acknowledged  Taken 12/26/2023 0800 by Gus Negron, RN  Trust Relationship/Rapport:   care explained   choices provided   emotional support provided   empathic listening provided   questions answered   questions encouraged   reassurance provided   thoughts/feelings acknowledged  Goal: Readiness for Transition of Care  Outcome: Progressing   Goal Outcome Evaluation:      Plan of Care Reviewed With: patient, spouse    Overall Patient Progress: improvingOverall Patient Progress: improving    Outcome Evaluation: Patient adequate for transfer out of ICU to medical floor. Vitals WDL, neurologically intact. Patient still has melena, though hemoglobin improved. Sat in the chair for 5 hours, ambulated in room. Ate two meals, good apetite, on full liquid diet at this time. Spouse and daughter updated at bedside.

## 2023-12-27 NOTE — PROGRESS NOTES
Vascular Surgery Progress Note    Subjective:  Nursing reporting there was a small amount of blood in the stools.  Hemoglobin stable.  Patient is asymptomatic.  No abdominal pain.    Objective:    *Vitals, labs, intake and output, pertinent imaging, and other pertinent studies were reviewed*    Gen: NAD  HEENT: NCAT  Vascular: palpable left radial pulse  Wound: left arm incision c/d/I  Neuro: Moving all extremities  Psych: Cooperative    Assessment and Plan:  78 year old female s/p SMA stenting for ischemic ulcer of the gastric antrum and duodenum and likely chronic mesenteric ischemia last week re admitted for bleeding.    - Hgb stable now  -Continue aspirin  -Once okay by other services, please resume Plavix as well  - CTM for bleeding    Linda Clark MD  Vascular Surgery Fellow        BP (!) 153/59   Pulse 75   Temp 98.1  F (36.7  C) (Oral)   Resp 16   Wt 55.8 kg (123 lb 0.3 oz)   SpO2 90%   BMI 19.86 kg/m       Intake/Output Summary (Last 24 hours) at 12/26/2023 0739  Last data filed at 12/26/2023 0400  Gross per 24 hour   Intake 595 ml   Output 600 ml   Net -5 ml        STAFF: Doing well today.  Very comfortable.  Stable hemoglobin.  Restarted aspirin yesterday.  If all agree will restart Plavix also.    On clear liquids.  Advance diet per GI medicine.       Abner Parekh MD

## 2023-12-27 NOTE — PROGRESS NOTES
Chart Check (no charge):    Patient not seen. Recent progress notes, labs and imaging results reviewed.     Hemoglobin stable at 9.3 this AM.     - Continue pantoprazole 40 mg BID  - daily hemoglobin  - consistent carb diet  - ok with GI to transfer out of ICU  - continue aspirin and may restart Plavix- risk of recurrent GI bleed is high but can continue to monitor for signs of recurrent GI bleed  - GI will sign off service    ANA LILIA Ba, PA-C  [facility]  Office: [phone]  Cell: [phone]

## 2023-12-27 NOTE — PROGRESS NOTES
[facility]    Hospitalist Progress Note    Assessment & Plan   Date of Admission:  12/25/2023        Assessment & Plan  Tammy Osorio is a 78 year old female with a history of DM2, Htn, COPD, recent admission from 12/18-22 for acute anemia, duodenal ulcers and SMA stenosis who is admitted on 12/25/2023 with acute GI bleed and severe anemia.      Acute GI bleed 2/2 duodenal ulcers and gastric erosions   Severe, acute blood loss anemia  Possible hemorrhagic shock  Patient presents with hgb of 3.5 after recent admission from 12/18-22 for hgb of 5.0 and findings of multiple, large, cratered duodenal ulcers, large at 20mm. Also newly started on ASA and plavix after placement of SMA stent for severe SMA stenosis. Acute GI bleed likely a combination of ASA/plavix and reperfusion of SMA. Has intermittently had SBP in the 50-70s but is responding to massive transfusion protocol with 4 units PRBCs, platelets and FFP.   * EGD:  Normal esophagus. Clotted blood in the gastric antrum.  Gastric erosions with no bleeding and no stigmata of recent bleeding.  Oozing duodenal ulcers with a visible vessel. Spurting duodenal ulcer with a visible vessel.  Treated with bipolar cautery. Injected. Blood in the entire examined duodenum.   -hemodyncamically nl  - Hb  stable in 9 range following transfusion  -no GI sxs.       Plan;   -Hb q8 hours  -full liquid diet per GI  - Transfer out of ICU   - Hemoglobins have been stable and backing off to daily checks  - Transfuse for hgb <7, conditional orders placed.  Has been transfused 8 units of PRBCs total, platelets and FFP   - IV protonix BID  - ASA resumed 12/26  - still holding Plavix today, await input from vasc surgery. May restart plavix per gI but risk of recurrent bleed high with both asa, plavix.   - GI following and appreciate their recommendations.  Defer diet advancement to them     Addendum; restart plavix per vasc surgery and GI. Will advanced to  regular dm diet, bid ppi for 2months. Follow up with GI 1-2 weeks. They will call. Egd in 2-3 months with Jordan. 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 Seen by vascular surgery during previous hospitalization in December, SMA stent placed.   - Vascular surgery consulted and appreciate their recommendations.    Start ASA as soon as able (started) but can continue holding Plavix      Moderate left pleural effusion has increased in size   Noted on CT  Aggressive IS.   Weaned off oxygen 12/27.   Follow up    HTN   HLP   - Resumed PTA Lopressor.   -resume pTA norvasc  - Holding PTA lisinopril  - Holding statin. Resume on discharge     DM type II   Recently taken of metformin per the . Hgb A1C of 5.9 in December 2023.    to low 200 range.   - Switched medium intensity SSI to meal time      Anxiety  - Resumed PTA zoloft      Left adnexal mass s/p ex lap, bilateral salpingo-oophorectomy, and pelvic washings with removal of mass (11/27/2023).  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  - Follow up with GYN onc as scheduled                 Diet: Full Liquid Diet    DVT Prophylaxis: Pneumatic Compression Devices  Andres Catheter: Not present  Lines: None     Cardiac Monitoring: None  Code Status: Full Code          Clinically Significant Risk Factors []Expand by Default          # Hypocalcemia: Lowest iCa = 3.7 mg/dL in last 2 days, will monitor and replace as appropriate     # Hypoalbuminemia: Lowest albumin = 2.3 g/dL at 12/25/2023  3:15 AM, will monitor as appropriate  # Coagulation Defect: INR = 1.52 (Ref range: 0.85 - 1.15) and/or PTT = 27 Seconds (Ref range: 22 - 38 Seconds), will monitor for bleeding    #  Hypertension: Noted on problem list            # Financial/Environmental Concerns:                  Disposition Plan     Expected Discharge Date: 1-2 days      Anticipate discharge home.   GI, vasc surgery and pcp appt follow up with Hb  Gyn surgery follow up     Rafael Kaminski MD, MD  Text Page  (7am to 6pm)  Interval History   Hb stable over night. No new issues.   No new complaints. No n/v/abd pain. No sob  Up walking in room. No dizziness    -Data reviewed today: I reviewed all new labs and imaging results over the last 24 hours. I personally reviewed labs last 24 hours.     Physical Exam   Temp: 98.1  F (36.7  C) Temp src: Oral BP: (!) 153/59 Pulse: 75   Resp: 16 SpO2: 90 % O2 Device: None (Room air) Oxygen Delivery: 2 LPM  Vitals:    12/26/23 0400 12/27/23 0200   Weight: 51.6 kg (113 lb 12.1 oz) 55.8 kg (123 lb 0.3 oz)     Vital Signs with Ranges  Temp:  [97.3  F (36.3  C)-98.1  F (36.7  C)] 98.1  F (36.7  C)  Pulse:  [66-91] 75  Resp:  [16-32] 16  BP: (125-154)/(48-68) 153/59  SpO2:  [90 %-97 %] 90 %  I/O last 3 completed shifts:  In: 540 [P.O.:540]  Out: 1725 [Urine:1725]    Constitutional: In bed, nad  Respiratory: Decreased BS L base ~30% Up. Breathing easilyi   Cardiovascular: RRR no r/g/m  GI: soft, nt, nd  Skin/Integumen: no rash or edema  Neuro: nl speech and mentation  Psych:nl affect  :      Medications      aspirin  81 mg Oral Daily    insulin aspart  1-7 Units Subcutaneous TID AC    insulin aspart  1-5 Units Subcutaneous At Bedtime    metoprolol tartrate  50 mg Oral Daily    pantoprazole  40 mg Intravenous BID AC    Or    pantoprazole  40 mg Oral BID AC    sertraline  50 mg Oral QPM       Data   Recent Labs   Lab 12/27/23  0751 12/27/23  0627 12/27/23  0005 12/26/23  2246 12/26/23  1635 12/26/23  1544 12/26/23  0737 12/26/23  0608 12/25/23  0542 12/25/23  0400 12/25/23  0315 12/20/23  1719 12/20/23  1533   WBC  --   --   --   --   --   --   --  9.5  --  5.9 11.0   < >  --    HGB  --  9.3* 9.1*   --   --  9.0*  --  8.2*  8.2*   < > 5.8* 3.5*   < >  --    MCV  --   --   --   --   --   --   --  87  --  89 95   < >  --    PLT  --   --   --   --   --   --   --  321  --  257 373   < >  --    INR  --   --   --   --   --   --   --   --   --  1.52*  --   --  1.09   NA  --   --   --   --   --   --   --  139  --  135 135   < >  --    POTASSIUM  --   --   --   --   --   --   --  3.7  --  4.8 5.2   < >  --    CHLORIDE  --   --   --   --   --   --   --  107  --  106 104   < >  --    CO2  --   --   --   --   --   --   --  24  --  20* 18*   < >  --    BUN  --   --   --   --   --   --   --  14.8  --  25.7* 26.2*   < >  --    CR  --   --   --   --   --   --   --  0.59  --  0.65 0.67   < >  --    ANIONGAP  --   --   --   --   --   --   --  8  --  9 13   < >  --    ARCHANA  --   --   --   --   --   --   --  7.9*  --  6.9* 7.8*   < >  --    *  --   --  147* 206*  --    < > 153*   < > 276* 289*   < >  --    ALBUMIN  --   --   --   --   --   --   --   --   --   --  2.3*  --   --    PROTTOTAL  --   --   --   --   --   --   --   --   --   --  4.2*  --   --    BILITOTAL  --   --   --   --   --   --   --   --   --   --  0.2  --   --    ALKPHOS  --   --   --   --   --   --   --   --   --   --  69  --   --    ALT  --   --   --   --   --   --   --   --   --   --  5  --   --    AST  --   --   --   --   --   --   --   --   --   --  15  --   --     < > = values in this interval not displayed.       Imaging:   No results found for this or any previous visit (from the past 24 hour(s)).

## 2023-12-27 NOTE — PLAN OF CARE
Problem: Adult Inpatient Plan of Care  Goal: Plan of Care Review  Description: The Plan of Care Review/Shift note should be completed every shift.  The Outcome Evaluation is a brief statement about your assessment that the patient is improving, declining, or no change.  This information will be displayed automatically on your shift  note.  Outcome: Adequate for Care Transition  Flowsheets (Taken 12/27/2023 0546)  Outcome Evaluation: Pt VSS, A&O, neuros intact. Pt continues to have bloody stools. Hgb stable. Patient adequate to tx to medical floor when bed available.  Plan of Care Reviewed With: patient  Overall Patient Progress: improving     Problem: Adult Inpatient Plan of Care  Goal: Absence of Hospital-Acquired Illness or Injury  Intervention: Prevent and Manage VTE (Venous Thromboembolism) Risk  Recent Flowsheet Documentation  Taken 12/27/2023 0400 by Leonor Causey RN  VTE Prevention/Management: SCDs (sequential compression devices) on  Taken 12/27/2023 0000 by Leonor Causey RN  VTE Prevention/Management: patient refused intervention  Taken 12/26/2023 2000 by Leonor Causey RN  VTE Prevention/Management: patient refused intervention

## 2023-12-28 ENCOUNTER — APPOINTMENT (OUTPATIENT)
Dept: OCCUPATIONAL THERAPY | Facility: CLINIC | Age: 78
DRG: 377 | End: 2023-12-28
Attending: INTERNAL MEDICINE
Payer: MEDICARE

## 2023-12-28 VITALS
OXYGEN SATURATION: 95 % | TEMPERATURE: 98 F | RESPIRATION RATE: 16 BRPM | WEIGHT: 123.02 LBS | DIASTOLIC BLOOD PRESSURE: 65 MMHG | HEART RATE: 90 BPM | SYSTOLIC BLOOD PRESSURE: 145 MMHG | BODY MASS INDEX: 19.86 KG/M2

## 2023-12-28 LAB
ATRIAL RATE - MUSE: 72 BPM
BLD PROD TYP BPU: NORMAL
BLOOD COMPONENT TYPE: NORMAL
CODING SYSTEM: NORMAL
CROSSMATCH: NORMAL
CROSSMATCH: NORMAL
DIASTOLIC BLOOD PRESSURE - MUSE: NORMAL MMHG
ERYTHROCYTE [DISTWIDTH] IN BLOOD BY AUTOMATED COUNT: 14.6 % (ref 10–15)
GLUCOSE BLDC GLUCOMTR-MCNC: 163 MG/DL (ref 70–99)
GLUCOSE BLDC GLUCOMTR-MCNC: 175 MG/DL (ref 70–99)
GLUCOSE BLDC GLUCOMTR-MCNC: 211 MG/DL (ref 70–99)
HCT VFR BLD AUTO: 29.1 % (ref 35–47)
HGB BLD-MCNC: 9.5 G/DL (ref 11.7–15.7)
INTERPRETATION ECG - MUSE: NORMAL
ISSUE DATE AND TIME: NORMAL
MCH RBC QN AUTO: 29.2 PG (ref 26.5–33)
MCHC RBC AUTO-ENTMCNC: 32.6 G/DL (ref 31.5–36.5)
MCV RBC AUTO: 90 FL (ref 78–100)
P AXIS - MUSE: 56 DEGREES
PLATELET # BLD AUTO: 405 10E3/UL (ref 150–450)
PR INTERVAL - MUSE: 140 MS
QRS DURATION - MUSE: 86 MS
QT - MUSE: 404 MS
QTC - MUSE: 442 MS
R AXIS - MUSE: 72 DEGREES
RBC # BLD AUTO: 3.25 10E6/UL (ref 3.8–5.2)
SYSTOLIC BLOOD PRESSURE - MUSE: NORMAL MMHG
T AXIS - MUSE: -88 DEGREES
UNIT ABO/RH: NORMAL
UNIT NUMBER: NORMAL
UNIT STATUS: NORMAL
UNIT TYPE ISBT: 5100
UNIT TYPE ISBT: 6200
UNIT TYPE ISBT: 6200
UNIT TYPE ISBT: 9500
UNIT TYPE ISBT: 9500
VENTRICULAR RATE- MUSE: 72 BPM
WBC # BLD AUTO: 10.4 10E3/UL (ref 4–11)

## 2023-12-28 PROCEDURE — 99239 HOSP IP/OBS DSCHRG MGMT >30: CPT | Performed by: INTERNAL MEDICINE

## 2023-12-28 PROCEDURE — 97165 OT EVAL LOW COMPLEX 30 MIN: CPT | Mod: GO

## 2023-12-28 PROCEDURE — 97535 SELF CARE MNGMENT TRAINING: CPT | Mod: GO

## 2023-12-28 PROCEDURE — 97530 THERAPEUTIC ACTIVITIES: CPT | Mod: GO

## 2023-12-28 PROCEDURE — 250N000013 HC RX MED GY IP 250 OP 250 PS 637: Performed by: SURGERY

## 2023-12-28 PROCEDURE — 36415 COLL VENOUS BLD VENIPUNCTURE: CPT | Performed by: INTERNAL MEDICINE

## 2023-12-28 PROCEDURE — 250N000013 HC RX MED GY IP 250 OP 250 PS 637: Performed by: INTERNAL MEDICINE

## 2023-12-28 PROCEDURE — 99231 SBSQ HOSP IP/OBS SF/LOW 25: CPT

## 2023-12-28 PROCEDURE — 85027 COMPLETE CBC AUTOMATED: CPT | Performed by: INTERNAL MEDICINE

## 2023-12-28 RX ORDER — PANTOPRAZOLE SODIUM 40 MG/1
40 TABLET, DELAYED RELEASE ORAL 2 TIMES DAILY
Qty: 120 TABLET | Refills: 3 | Status: SHIPPED | OUTPATIENT
Start: 2023-12-28

## 2023-12-28 RX ADMIN — METOPROLOL TARTRATE 50 MG: 50 TABLET, FILM COATED ORAL at 08:54

## 2023-12-28 RX ADMIN — PANTOPRAZOLE SODIUM 40 MG: 40 TABLET, DELAYED RELEASE ORAL at 08:54

## 2023-12-28 RX ADMIN — CLOPIDOGREL BISULFATE 75 MG: 75 TABLET ORAL at 08:54

## 2023-12-28 RX ADMIN — ASPIRIN 81 MG CHEWABLE TABLET 81 MG: 81 TABLET CHEWABLE at 08:54

## 2023-12-28 ASSESSMENT — ACTIVITIES OF DAILY LIVING (ADL)
ADLS_ACUITY_SCORE: 26
ADLS_ACUITY_SCORE: 34
ADLS_ACUITY_SCORE: 34
ADLS_ACUITY_SCORE: 26
ADLS_ACUITY_SCORE: 26

## 2023-12-28 NOTE — PLAN OF CARE
DATE & TIME: 12/27/2023 1097-1440    Cognitive Concerns/ Orientation : Aox4, pleasant   BEHAVIOR & AGGRESSION TOOL COLOR: Green  CIWA SCORE: NA   ABNL VS/O2: VSS RA  MOBILITY: Assist 1 GBW, ambulated to the bathroom x3  PAIN MANAGMENT: Denied  DIET: Mod Carb  BOWEL/BLADDER: Continent of both, x2 very small bm; bloody, maroon color.   ABNL LAB/BG: Hgb 9.5, 9.5 RBC, 3.25. , 163  DRAIN/DEVICES: L PIV SL  TELEMETRY RHYTHM: NA  SKIN: Abd inc w/ steri strip, scattered bruising, Scab on the R knee, shin.   TESTS/PROCEDURES: na  D/C DATE: Pending  OTHER IMPORTANT INFO: Pt recently had a SMA stent placed on 12/21/23. Abd inc w/ steri strips intact. GI, PT/OT following.

## 2023-12-28 NOTE — DISCHARGE SUMMARY
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       History of Present Illness   Tammy Osorio is a 78 year old female who has a history of DM2, htn, COPD, duodenal ulcer with recent admission for anemia and SMA stenosis with stent placement who returns to the ED with concerns of GI bleed. Patient was admitted from 12/18-22 after being found to have a hgb of 5 and sent to the ED. She was transfused and had an EGD showing multiple cratered, nonbleeding duodenal ulcers, largest of which was 20mm. Also noted was occluded celiac and severely stenosed SMA. Vascular surgery was consulted and placed SMA stent. She was initiated on ASA and plavix which she has been taking. Prior to this all back on 11/27 she underwent ex-lap with BSO and was subsequently found to have nearly occluded gonadal vein thrombosis which GYN noted to be common after her surgery and no specific treatment was needed.      Following her discharge on 12/22 she felt a bit lightheaded at times and her  noted a bit of blood on the toilet paper on 12/23. On 12/24 she had a large, bloody stool which was the cause of her presentation to the ED. She had some abdominal pain in her central abdomen with this. Denies any chest pain, cough or shortness of breath. Upon arrival to the ED she was noted to have a hgb of 3.5, massive transfusion protocol was initiated with 4 units PRBCs along with  platelets and FFP. Her color has improved and she symptomatically feels better. GI and vascular surgery were called in the ED. Admission to ICU planned.           Hospital Course   Tammy Osorio was admitted on 12/25/2023.  The following problems were addressed during her hospitalization:    Principal Problem:    Gastrointestinal hemorrhage, unspecified gastrointestinal hemorrhage type  Date of Admission:  12/25/2023        Assessment & Plan  Tammy Osorio is a 78 year old female with a history of DM2, Htn, COPD, recent admission from 12/18-22 for acute anemia, duodenal ulcers and SMA stenosis who is admitted on 12/25/2023 with acute GI bleed and severe anemia.      Acute GI bleed 2/2 duodenal ulcers and gastric erosions   Severe, acute blood loss anemia  Possible hemorrhagic shock  Patient presents with hgb of 3.5 after recent admission from 12/18-22 for hgb of 5.0 and findings of multiple, large, cratered duodenal ulcers, large at 20mm secondary to ischemia.  Also newly started on ASA and plavix after placement of SMA stent for severe SMA stenosis during last hospital stay.  etiology of ischemic duodenal ulcers.     - Acute GI bleed this admission likely a combination of ASA/plavix and reperfusion of SMA. Has intermittently had SBP in the 50-70s on admission but responded to massive transfusion protocol with 4 units PRBCs, platelets and FFP.   * EGD:  Normal esophagus. Clotted blood in the gastric antrum.  Gastric erosions with no bleeding and no stigmata of recent bleeding.  Oozing duodenal ulcers with a visible vessel. Spurting duodenal ulcer with a visible vessel.  Treated with bipolar cautery. Injected. Blood in the entire examined duodenum.   -hemodyncamically nl for remainder of hospital stay.   - followed in IcU initially and transition to [facility] bed  - Hb  stable in 9 range following transfusion  -no GI sxs.  -original duodenal ulcers 2/2 to ischemia- severe SMA stenosis s/p SMA stent on 12/21/23  -  rebleed this admission 2/2 antiplatelet agents.   - following above tx during EGD pt had no further bleeding. Continued to pass maroon stools but improve. Soft stool partially brown on discharge. Benign abd. Taking po well. Nl vitals.   - pt followed by GI and vasc surgery.   - pt diet advanced to regular diet and tolerated  - per GI and vasc surgery, pt ASA and plavix resumed.   - pt  maintained on bid ppi.     Plan at discharge:   Protonix 40mg bid for at least 2 months.   Cont asa, plavix per vasc surgery  [facility] follow up 1-2 weeks, will need EGD in 2-3 months.  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 Seen by vascular surgery during previous hospitalization in December, SMA stent placed.   - Vascular surgery consulted and appreciate their recommendations.    -restarted on ASA and plavix  - vasc surgery follow up        Moderate left pleural effusion has increased in size   Noted on CT  Aggressive IS.   Weaned off oxygen 12/27.   Breathing easily. Reduced BS L base ~30% up.   Follow up with pcp, follow up cxr if persisting      HTN   HLP   - Resumed PTA Lopressor.   -resume pTA norvasc in 2 days.   - Holding PTA lisinopril until seen by pcp outpatient   - Holding statin. Resume on discharge     DM type II   Recently taken of metformin per the . Hgb A1C of 5.9 in December 2023.    to low 200 range.   -maintained on ISS during hospital stay       Anxiety  - Resumed PTA zoloft      Left adnexal mass s/p ex lap, bilateral salpingo-oophorectomy, and pelvic washings with removal of mass (11/27/2023).  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  - Follow up with GYN onc as  scheduled                 Diet: regular diet  DVT Prophylaxis: Pneumatic Compression Devices  Andres Catheter: Not present  Lines: None     Cardiac Monitoring: None  Code Status: Full Code          Clinically Significant Risk Factors []Expand by Default          # Hypocalcemia: Lowest iCa = 3.7 mg/dL in last 2 days, will monitor and replace as appropriate     # Hypoalbuminemia: Lowest albumin = 2.3 g/dL at 12/25/2023  3:15 AM, will monitor as appropriate  # Coagulation Defect: INR = 1.52 (Ref range: 0.85 - 1.15) and/or PTT = 27 Seconds (Ref range: 22 - 38 Seconds), will monitor for bleeding    # Hypertension: Noted on problem list            # Financial/Environmental Concerns:                  Disposition Plan- discharge home.   Pcp, [facility] surgery, [facility] follow up        Rafael Kaminski MD, MD    Significant Results and Procedures   See hospital course    Pending Results   These results will be followed up by hospitalist  Unresulted Labs Ordered in the Past 30 Days of this Admission       Date and Time Order Name Status Description    12/25/2023  3:57 AM Prepare red blood cells (unit) Preliminary     12/25/2023  3:57 AM Prepare red blood cells (unit) Preliminary     12/25/2023  3:57 AM Prepare plasma (unit) Preliminary     12/25/2023  3:57 AM Prepare red blood cells (unit) Preliminary     12/25/2023  3:57 AM Prepare red blood cells (unit) Preliminary             Code Status   Full Code       Primary Care Physician   Heriberto Jackson    Physical Exam   Temp: 98  F (36.7  C) Temp src: Oral BP: (!) 145/65 Pulse: 90   Resp: 16 SpO2: 95 % O2 Device: None (Room air)    Vitals:    12/26/23 0400 12/27/23 0200   Weight: 51.6 kg (113 lb 12.1 oz) 55.8 kg (123 lb 0.3 oz)     Vital Signs with Ranges  Temp:  [97.5  F (36.4  C)-98.6  F (37  C)] 98  F (36.7  C)  Pulse:  [] 90  Resp:  [13-29] 16  BP: (100-145)/(45-71) 145/65  SpO2:  [94 %-95 %] 95 %  I/O last 3 completed shifts:  In: 240 [P.O.:240]  Out: -      Constitutional: In bed, nad  Respiratory:     Decreased BS L base ~30% Up. Breathing easily  Cardiovascular: RRR no r/g/m  GI: soft, nt, nd  Skin/Integumen: no rash or edema  Neuro: nl speech and mentation  Psych:nl affect    Discharge Disposition   Discharged to home  Condition at discharge: Good    Consultations This Hospital Stay   GASTROENTEROLOGY IP CONSULT  VASCULAR SURGERY IP CONSULT  VASCULAR ACCESS ADULT IP CONSULT  CARE MANAGEMENT / SOCIAL WORK IP CONSULT  CARE MANAGEMENT / SOCIAL WORK IP CONSULT  PHYSICAL THERAPY ADULT IP CONSULT  OCCUPATIONAL THERAPY ADULT IP CONSULT    Time Spent on this Encounter   Rafael ROSALES MD, personally saw the patient today and spent greater than 30 minutes discharging this patient.    Discharge Orders      Follow-up and recommended labs and tests     Appointment with DR Patrick Payne on 1/3/24 at 2:20 pm at [facility]  for hospital follow up and labs     Activity    Your activity upon discharge: activity as tolerated     Discharge Instructions    1. Restart your amlodipine/Norvasc in 2 days.   2. For now hold your Lisinopril until see primary care doctor in clinic  3. Bring in all medications to doctors visit  4. Continue to take both the aspirin and Plavix/clopidigral  5. Continue to take protonix/pantoprezole twice daily for at least 2 months (take on empty stomach 30-60 minutes before meal). Dr. 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 Follow up with primary care doctor with bmp and CBC with platelet count as scheduled  2. Follow up with Dr. Ortega or partner of [facility] in 1- 2 weeks to follow  up bleeding. They will call you. You will need a repeat upper endoscopy in 2-3 months as well.   3. Follow up with vascular surgeons regarding stenting of abdominal artery system and aspirin and plavix therapy. You have an appt with them on 1/10/24 at 1pm  4. Follow up with gynecologist surgeon regarding routine follow up of mass removal surgery you had recently     Full Code     Diet    Follow this diet upon discharge: Orders Placed This Encounter      Moderate Consistent Carb (60 g CHO per Meal) Diet     Discharge Medications   Discharge Medication List as of 12/28/2023  2:12 PM        CONTINUE these medications which have CHANGED    Details   pantoprazole (PROTONIX) 40 MG EC tablet Take 1 tablet (40 mg) by mouth 2 times daily ; BID for 8 weeks, then daily., Disp-120 tablet, R-3, E-PrescribeFuture refills by PCP Dr. Heriberto Jackson with phone number [phone].           CONTINUE these medications which have NOT CHANGED    Details   amLODIPine (NORVASC) 10 MG tablet Take 10 mg by mouth daily, Historical      aspirin 81 MG EC tablet Take 1 tablet (81 mg) by mouth daily, Disp-30 tablet, R-0, E-Prescribe      atorvastatin (LIPITOR) 40 MG tablet Take 1 tablet (40 mg) by mouth every evening, Disp-30 tablet, R-3, E-Prescribe      clopidogrel (PLAVIX) 75 MG tablet Take 1 tablet (75 mg) by mouth daily for 180 days, Disp-90 tablet, R-1, E-Prescribe      metoprolol tartrate (LOPRESSOR) 50 MG tablet Take 50 mg by mouth daily, Historical      ondansetron (ZOFRAN ODT) 4 MG ODT tab Take 1 tablet (4 mg) by mouth every 6 hours as needed for nausea or vomiting, Disp-30 tablet, R-0, E-Prescribe      sertraline (ZOLOFT) 50 MG tablet Take 50 mg by mouth every evening, Historical      traMADol (ULTRAM) 50 MG tablet Take 0.5-1 tablets (25-50 mg) by mouth every 6 hours as needed for severe pain, Disp-15 tablet, R-0, E-Prescribe           STOP taking these medications       lisinopril (ZESTRIL)  20 MG tablet Comments:   Reason for Stopping:             Allergies   Allergies   Allergen Reactions    Sulfa Antibiotics Swelling     Swelling of lips     Data   Most Recent 3 CBC's:  Recent Labs   Lab Test 12/28/23  0552 12/27/23  2248 12/27/23  1520 12/26/23  1544 12/26/23  0608 12/25/23  0928 12/25/23  0400   WBC 10.4  --   --   --  9.5  --  5.9   HGB 9.5* 9.5* 9.3*   < > 8.2*  8.2*   < > 5.8*   MCV 90  --   --   --  87  --  89     --   --   --  321  --  257    < > = values in this interval not displayed.      Most Recent 3 BMP's:  Recent Labs   Lab Test 12/28/23  0834 12/28/23  0209 12/27/23  2154 12/26/23  0737 12/26/23  0608 12/25/23  0542 12/25/23  0400 12/25/23  0315   NA  --   --   --   --  139  --  135 135   POTASSIUM  --   --   --   --  3.7  --  4.8 5.2   CHLORIDE  --   --   --   --  107  --  106 104   CO2  --   --   --   --  24  --  20* 18*   BUN  --   --   --   --  14.8  --  25.7* 26.2*   CR  --   --   --   --  0.59  --  0.65 0.67   ANIONGAP  --   --   --   --  8  --  9 13   ARCHANA  --   --   --   --  7.9*  --  6.9* 7.8*   * 163* 255*   < > 153*   < > 276* 289*    < > = values in this interval not displayed.     Most Recent 2 LFT's:  Recent Labs   Lab Test 12/25/23 0315 12/18/23  1559   AST 15 21   ALT 5 11   ALKPHOS 69 90   BILITOTAL 0.2 0.5     Most Recent INR's and Anticoagulation Dosing History:  Anticoagulation Dose History          Latest Ref Rng & Units 12/20/2023 12/25/2023   Recent Dosing and Labs   INR 0.85 - 1.15 1.09  1.52      Most Recent 3 Troponin's:No lab results found.  Most Recent Cholesterol Panel:  Recent Labs   Lab Test 12/22/23  0841   CHOL 115   LDL 50   HDL 42*   TRIG 116     Most Recent 6 Bacteria Isolates From Any Culture (See EPIC Reports for Culture Details):No lab results found.  Most Recent TSH, T4 and A1c Labs:  Recent Labs   Lab Test 12/22/23  0841 12/18/23  1559   TSH  --  3.53   A1C 5.9*  --      Results for orders placed or performed during the hospital  encounter of 12/25/23   CTA Abdomen Pelvis with Contrast    Narrative    EXAM: CTA ABDOMEN PELVIS WITH CONTRAST  LOCATION: [facility]  DATE: 12/25/2023    INDICATION: recent sma stent placement with bright red blood per rectum  COMPARISON: 12/18/2023  TECHNIQUE: CT angiogram abdomen pelvis during arterial phase of injection of IV contrast. 2D and 3D MIP reconstructions were performed by the CT technologist. Dose reduction techniques were used.  CONTRAST: 65 mL Isovue 370    FINDINGS:  ANGIOGRAM ABDOMEN/PELVIS: Moderate aortoiliac atherosclerotic calcification, unchanged. An infrarenal abdominal aortic aneurysm measuring 4.4 cm AP by 4.3 cm transverse is not substantially changed. Associated thrombus formation, however, has increased   from recent study. Segmental moderate narrowing of the proximal left common iliac artery redemonstrated, unchanged. Right internal iliac artery is proximally occluded with distal reconstitution. A new stent extending from the aortic lumen to the proximal   SMA is patent. 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 No active contrast extravasation identified.    LOWER CHEST: Moderate left pleural effusion has increased in size. Trace right pleural effusion is new. Atelectasis seen in the left lung base. No confluent airspace consolidations.    HEPATOBILIARY: Normal. A 1.5 cm cyst again seen in the central liver, unchanged.    PANCREAS: Normal.    SPLEEN: Normal.    ADRENAL GLANDS: Normal.    KIDNEYS/BLADDER: Normal. 2 small to characterize cystic lesions in the right kidney statistically represents cysts and do not require imaging workup. Gas within the bladder lumen is likely related to recent Andres instrumentation. Urinary bladder is   otherwise unremarkable.    BOWEL: No focal bowel wall thickening or obstruction. Appendix is normal. Small volume free fluid has increased from prior study. No intraperitoneal free  air.    LYMPH NODES: Normal.    PELVIC ORGANS: Normal.    MUSCULOSKELETAL: Diffuse body wall edema has increased from recent study. Multilevel mild and moderate degenerative disc space narrowing seen in the visualized thoracolumbar spine most advanced at L4/L5. No suspicious osseous lesions or acute fractures.   Small bilateral fat-containing inguinal hernias noted.        Impression    IMPRESSION:    1.  Interval placement of stent in the SMA, patent. 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    2.  No active contrast extravasation to indicate acute hemorrhage.    3.  Stable 4.4 cm infrarenal abdominal aortic aneurysm with increased associated thrombus formation.    4.  Increased small right/moderate left pleural effusions, small volume ascites and generalized body wall edema.

## 2023-12-28 NOTE — PROGRESS NOTES
12/28/23 1110   Appointment Info   Signing Clinician's Name / Credentials (OT) Niru Kamala, OTR/L   Living Environment   People in Home spouse   Current Living Arrangements house   Home Accessibility stairs within home;stairs to enter home   Number of Stairs, Main Entrance 7   Stair Railings, Main Entrance railings on both sides of stairs   Transportation Anticipated family or friend will provide   Living Environment Comments Pt lives in home w/ spouse, daughter and son also nearby who can assist. Pt has tub shower w/ grab bars, no shower chair.   Self-Care   Usual Activity Tolerance moderate   Current Activity Tolerance moderate   Regular Exercise No   Equipment Currently Used at Home walker, standard   Fall history within last six months yes   Number of times patient has fallen within last six months 1   Activity/Exercise/Self-Care Comment Pt typically independent w/ ADL tasks and mobility, did get FWW after last hospitalization and uses as needed   Instrumental Activities of Daily Living (IADL)   IADL Comments Pt typically independent w/ home mgmt but family can help. She manages her own meds and drives.   General Information   Onset of Illness/Injury or Date of Surgery 12/25/23   Referring Physician Rafael Kaminski MD   Patient/Family Therapy Goal Statement (OT) go home   Additional Occupational Profile Info/Pertinent History of Current Problem 78 year old female s/p SMA stenting for ischemic ulcer of the gastric antrum and duodenum and likely chronic mesenteric ischemia last week re admitted for bleeding.   Existing Precautions/Restrictions fall;abdominal   Cognitive Status Examination   Orientation Status orientation to person, place and time   Cognitive Status Comments AOx4, following all commands consistently   Visual Perception   Visual Impairment/Limitations WFL   Pain Assessment   Patient Currently in Pain No   Range of Motion Comprehensive   Comment, General Range of Motion BUE WFL   Strength  Comprehensive (MMT)   Comment, General Manual Muscle Testing (MMT) Assessment some generalized weakness though WFL   Bed Mobility   Bed Mobility sit-supine;supine-sit   Supine-Sit [place] (Bed Mobility) supervision   Sit-Supine [place] (Bed Mobility) supervision   Transfers   Transfers sit-stand transfer;toilet transfer;shower transfer   Sit-Stand Transfer   Sit-Stand [place] (Transfers) supervision   Assistive Device (Sit-Stand Transfers) walker, standard   Shower Transfer   Type (Shower Transfer) lateral   [place] Level (Shower Transfer) supervision   Assistive Device (Shower Transfer) grab bar, tub rail   Shower Transfer Comments tub shower   Toilet Transfer   Type (Toilet Transfer) sit-stand;stand-sit   [place] Level (Toilet Transfer) supervision   Assistive Device (Toilet Transfer) grab bars/safety frame   Balance   Balance Comments pt steady w/ FWW   Activities of Daily Living   BADL Assessment/Intervention bathing;upper body dressing;lower body dressing;grooming;toileting   Bathing Assessment/Intervention   [place] Level (Bathing) supervision   Comment, (Bathing) per clinical judgement   Upper Body Dressing Assessment/Training   Comment, (Upper Body Dressing) per clinical judgement   [place] Level (Upper Body Dressing) supervision   Lower Body Dressing Assessment/Training   [place] Level (Lower Body Dressing) supervision   Grooming Assessment/Training   Position (Grooming) unsupported standing   [place] Level (Grooming) supervision   Toileting   [place] Level (Toileting) supervision   Clinical Impression   Criteria for Skilled Therapeutic Interventions Met (OT) Yes, treatment indicated   OT Diagnosis decreased I/ADL independence   OT Problem List-Impairments impacting ADL problems related to;activity tolerance impaired;mobility;strength   Assessment of Occupational Performance 3-5 Performance Deficits   Identified Performance Deficits decreased independence w/  bathing, functional mobility, home mgmt   Planned Therapy Interventions (OT) ADL retraining;IADL retraining;strengthening;transfer training;progressive activity/exercise;risk factor education;home program guidelines   Clinical Decision Making Complexity (OT) problem focused assessment/low complexity   Risk & Benefits of therapy have been explained evaluation/treatment results reviewed;care plan/treatment goals reviewed;risks/benefits reviewed;current/potential barriers reviewed;participants voiced agreement with care plan;participants included;patient;son   OT Total Evaluation Time   OT Eval, Low Complexity Minutes (71264) 10   OT Goals   Therapy Frequency (OT) Daily   OT Predicted Duration/Target Date for Goal Attainment 12/30/23   OT Goals Hygiene/Grooming;Upper Body Dressing;Lower Body Dressing;Toilet Transfer/Toileting;Home Management   OT: Hygiene/Grooming modified independent;while standing   OT: Upper Body Dressing Modified independent;including set-up/clothing retrieval   OT: Lower Body Dressing Modified independent;including set-up/clothing retrieval   OT: Toilet Transfer/Toileting Modified independent;toilet transfer;cleaning and garment management;using adaptive equipment   OT: Home Management Modified independent;with light demand household tasks;ambulatory level   Interventions   Interventions Quick Adds Self-Care/Home Management;Therapeutic Activity   Self-Care/Home Management   Self-Care/Home Mgmt/ADL, Compensatory, Meal Prep Minutes (08345) 12   Symptoms Noted During/After Treatment (Meal Preparation/Planning Training) none   Treatment Detail/Skilled Intervention Pt in bed upon arrival, agrees to OT. AOx4 and following commands consistently. Supine<>sit w/ SBA. Pt stood from EOB w/ SBA and FWW, amb into bathroom w/ SBA. SBA toilet transfer and cleanup in sitting. BM bry and some blood noted, RN reports that is consistent with how it was earlier today. Pt then completed tub shower transfer w/ SBA  and VC for hand placement w/ grab bars. Pt stood at sink to wash hands w/ SBA. Provided pt w/ handout for where to purchase shower chair at home. Pt receptive, son or spouse can pick it up.   Therapeutic Activities   Therapeutic Activity Minutes (86693) 10   Symptoms noted during/after treatment fatigue   Treatment Detail/Skilled Intervention BP at /46. Pt stood w/ SBA and ambulated out into hallway ~250ft w/ SBA and FWW to progress activity tolerance for I/ADL independence. Pt steady, has some mild light headedness but otherwise feels okay. Took 1 standing rest break. Returned to room and pt sat EOB, /51. Ed pt on EC/WS strategies at home and encouraged continued walking to progress strength/activity tolerance. Pt and son receptive. Left in bed w/ all needs met, alarm on.   OT Discharge Planning   OT Plan review EC/WS strategies, full body dressing, likely only 1 more session needed   OT Discharge Recommendation (DC Rec) home with assist; home with home care Occupational Therapy   OT Rationale for DC Rec Pt below baseline, limited by decreased activity tolerance and weakness. Pt moving well and able to complete basic ADL tasks today w/ SBA. Pt has good support at home from spouse and son. Recommend return home w/ assist for bathing, IADL tasks and driving. Recommend resuming HH therapies to progress I/ADL independence at home. Also recommend pt continue to use FWW and purchase shower chair.   OT Brief overview of current status SBA w/ FWW, still fatigues but steady throughout, SBA toileting and standing g/h   OT Equipment Needed at Discharge shower chair   Total Session Time   Timed Code Treatment Minutes 22   Total Session Time (sum of timed and untimed services) 32

## 2023-12-28 NOTE — PROGRESS NOTES
VASCULAR SURGERY PROGRESS NOTE    Subjective:  Resting comfortably in bed. Continues to have decrease in amount of blood in stools. Hemoglobin stable. Patient asymptomatic. No abdominal pain, no nausea. Moderate Carb diet.    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     PLAN:  -HGB stable  -continue aspirin and plavix(resumed yesterday)  -CTM for bleeding    Shelbi Cheng NP  VASCULAR SURGERY

## 2023-12-28 NOTE — PLAN OF CARE
Physical Therapy Discharge Summary    Reason for therapy discharge:    Discharged to home.    Progress towards therapy goal(s). See goals on Care Plan in [facility] electronic health record for goal details.  Goals partially met.  Barriers to achieving goals:   discharge from facility.    Therapy recommendation(s):    Continue home exercise program.     PT Discharge Planning:    PT Plan: Gait in wright, stair negotiation, monitor Hgb. Today 9.3.  PT Discharge Recommendation (DC Rec): home with assist  PT Rationale for DC Rec: Pt would like to return home and resume her nursing cares that were present prior to this admit. Her  is home and will be wtih her most of the day, daughter is also highly involved in her care.  PT Brief overview of current status: Min A or less with bedside tranfers and gait with walker.    Recommendation above provided by last treating therapist.

## 2023-12-28 NOTE — PLAN OF CARE
Goal Outcome Evaluation:         DATE & TIME: 12/28/23 [id number]    Cognitive Concerns/ Orientation : Aox4, pleasant   BEHAVIOR & AGGRESSION TOOL COLOR: Green  CIWA SCORE: NA   ABNL VS/O2: VSS RA  MOBILITY: SBA with gait belt/walker, ambulates to bathroom   PAIN MANAGMENT: Denies  DIET: Mod Carb- tolerating diet   BOWEL/BLADDER: Continent of both, x2 very small bm; bloody, maroon color-MD aware.   ABNL LAB/BG: Hgb 9.5, , 211  DRAIN/DEVICES: IV removed   TELEMETRY RHYTHM: NA  SKIN: Abd inc w/ steri strip, scattered bruising, Scab on the R knee, shin.   TESTS/PROCEDURES: na  D/C DATE: Home today   OTHER IMPORTANT INFO: Home today with , reviewed discharge instructions with patient/ with understanding.      .Discharge    Patient discharged to home via car with    Care plan note  complete    Listed belongings gathered and given to patient (including from security/pharmacy). Yes  Care Plan and Patient education resolved: Yes  Prescriptions if needed, hard copies sent with patient  No- too early to fill Protonix, per pt she has at home  Medication Bin checked and emptied on discharge Yes  SW/care coordinator/charge RN aware of discharge: Yes

## 2023-12-29 NOTE — PLAN OF CARE
Occupational Therapy Discharge Summary    Reason for therapy discharge:    Discharged to home.    Progress towards therapy goal(s). See goals on Care Plan in [facility] electronic health record for goal details.  Goals partially met.  Barriers to achieving goals:   discharge from facility.    Therapy recommendation(s):    No further therapy is recommended. Pt below baseline, limited by decreased activity tolerance and weakness. Pt moving well and able to complete basic ADL tasks today w/ SBA. Pt has good support at home from spouse and son. Recommend return home w/ assist for bathing, IADL tasks and driving. Also recommend pt continue to use FWW and purchase shower chair.

## 2024-01-07 LAB
CPB POCT: NO
HCO3 BLDV-SCNC: 18 MMOL/L (ref 21–28)
HCT VFR BLD CALC: <15 % (ref 35–47)
HGB BLD-MCNC: <5.1 G/DL (ref 11.7–15.7)
PCO2 BLDV: 29 MM HG (ref 40–50)
PH BLDV: 7.41 [PH] (ref 7.32–7.43)
PO2 BLDV: 31 MM HG (ref 25–47)
POTASSIUM BLD-SCNC: 4.9 MMOL/L (ref 3.4–5.3)
SAO2 % BLDV: 61 % (ref 94–100)
SODIUM BLD-SCNC: 133 MMOL/L (ref 135–145)

## 2024-01-10 ENCOUNTER — OFFICE VISIT (OUTPATIENT)
Dept: OTHER | Facility: CLINIC | Age: 79
End: 2024-01-10
Payer: MEDICARE

## 2024-01-10 VITALS — DIASTOLIC BLOOD PRESSURE: 46 MMHG | HEART RATE: 61 BPM | SYSTOLIC BLOOD PRESSURE: 121 MMHG

## 2024-01-10 DIAGNOSIS — K55.9 MESENTERIC ISCHEMIA (H): Primary | ICD-10-CM

## 2024-01-10 DIAGNOSIS — K55.1 MESENTERIC ANGINA (H): ICD-10-CM

## 2024-01-10 PROCEDURE — 99213 OFFICE O/P EST LOW 20 MIN: CPT

## 2024-01-10 PROCEDURE — G0463 HOSPITAL OUTPT CLINIC VISIT: HCPCS

## 2024-01-10 NOTE — PROGRESS NOTES
[facility]        Patient is here for a  follow up.    Pt is currently taking Aspirin, Statin, and Plavix.    /46 (BP Location: Right arm, Patient Position: Chair, Cuff Size: Child)   Pulse 61     The provider has been notified that the patient has no concerns.     Questions patient would like addressed today are: N/A.    Refills are needed: N/A    Has homecare services and agency name:  Ellen Mendoza MA

## 2024-01-10 NOTE — PROGRESS NOTES
VASCULAR SURGERY PROGRESS NOTE    LOCATION: [facility]ascular [facility]    Tammy Osorio  Medical Record #: [id number]  YOB: 1945  Age: 78 year old     Date of Service: 1/10/2024    PRIMARY CARE PROVIDER: Heriberto Jackson    Reason for visit:  post-operative visit    Tammy Osorio is a 78 year old female who underwent SMA stenting for ischemic ulcer of the gastric antrum and duodenum on 12/21/2023. She was readmitted for GI bleeding from 12/25-28. Has since been at home, on aspirin and plavix.     On examination Tammy has minimal complaints  Recent hemoglobin recheck is 8.7, Tammy continues to have ongoing fatigue/signs of anemia-discussed iron supplementation, side effects, what to watch for, and she will begin taking this.   Denies nausea, GI bleeding, denies bloating, denies abdominal pain, reports good appetite  /46 P 61    RECOMMENDATION/RISKS: Continue Aspirin and Plavix. Will get abdominal duplex in 2 weeks and follow-up with Dr. Parekh. If she has any concerns for GI bleeding or acute blood loss should proceed to ED. If symptoms of mesenteric ischemia discussed being seen in clinic sooner for imaging. Tammy and her  is in agreement of plan    REVIEW OF SYSTEMS:    A 12 point ROS was reviewed and is negative except for what is listed above in HPI.    PHH:    Past Medical History:   Diagnosis Date    Anxiety     Aortic regurgitation     Ascending aortic aneurysm (H24)     Diabetes (H)     Hypertension     Osteopenia     Pelvic mass     Pulmonary emphysema (H)     Sciatica, unspecified laterality           Past Surgical History:   Procedure Laterality Date    ANGIOGRAM Left 12/21/2023    Procedure: LEFT BRACHIAL ARTERY CUTDOWN, ABDOMINAL ANGIOGRAM, AND STENTING OF SUPERIOR MESENTERIC ARTERY;  Surgeon: Natalia Mirza MD;  Location:  OR    ESOPHAGOSCOPY, GASTROSCOPY, DUODENOSCOPY (EGD), COMBINED N/A 12/19/2023    Procedure: Esophagoscopy, gastroscopy, duodenoscopy  (EGD), combined;  Surgeon: Boaz Ortega MD;  Location:  GI    HYSTERECTOMY TOTAL ABDOMINAL, BILATERAL SALPINGO-OOPHORECTOMY, COMBINED Bilateral 11/27/2023    Procedure: EXPLORATORY LAPAROTOMY , BILATERAL SALPINGO OOPHORECTOMY , PELVIC WASHING;  Surgeon: Naye Scruggs MD;  Location:  OR    IR OR ANGIOGRAM  12/21/2023       ALLERGIES:  Sulfa antibiotics    MEDS:    Current Outpatient Medications:     amLODIPine (NORVASC) 10 MG tablet, Take 10 mg by mouth daily, Disp: , Rfl:     aspirin 81 MG EC tablet, Take 1 tablet (81 mg) by mouth daily, Disp: 30 tablet, Rfl: 0    atorvastatin (LIPITOR) 40 MG tablet, Take 1 tablet (40 mg) by mouth every evening, Disp: 30 tablet, Rfl: 3    clopidogrel (PLAVIX) 75 MG tablet, Take 1 tablet (75 mg) by mouth daily for 180 days, Disp: 90 tablet, Rfl: 1    metoprolol tartrate (LOPRESSOR) 50 MG tablet, Take 50 mg by mouth daily, Disp: , Rfl:     ondansetron (ZOFRAN ODT) 4 MG ODT tab, Take 1 tablet (4 mg) by mouth every 6 hours as needed for nausea or vomiting, Disp: 30 tablet, Rfl: 0    pantoprazole (PROTONIX) 40 MG EC tablet, Take 1 tablet (40 mg) by mouth 2 times daily ; BID for 8 weeks, then daily., Disp: 120 tablet, Rfl: 3    sertraline (ZOLOFT) 50 MG tablet, Take 50 mg by mouth every evening, Disp: , Rfl:     traMADol (ULTRAM) 50 MG tablet, Take 0.5-1 tablets (25-50 mg) by mouth every 6 hours as needed for severe pain, Disp: 15 tablet, Rfl: 0    SOCIAL HABITS:    History   Smoking Status    Some Days    Types: Cigarettes   Smokeless Tobacco    Never     Social History    Substance and Sexual Activity      Alcohol use: Yes        Comment: occassional      History   Drug Use Unknown       FAMILY HISTORY:  No family history on file.    PE:  /46 (BP Location: Right arm, Patient Position: Chair, Cuff Size: Child)   Pulse 61   Wt Readings from Last 1 Encounters:   12/27/23 123 lb 0.3 oz (55.8 kg)     There is no height or weight on file to calculate  BMI.    EXAM:  GENERAL: well-developed 78 year old female who appears her stated age  CARDIAC: normal   CHEST/LUNG: normal respiratory effort   MUSCULOSKELETAL: grossly normal and both lower extremities are intact, no lower extremity edema  NEUROLOGIC: focally intact, alert and oriented x 3  PSYCH: appropriate affect  VASCULAR:     DIAGNOSTIC STUDIES:     Images:  CTA Abdomen Pelvis with Contrast    Result Date: 12/25/2023  EXAM: CTA ABDOMEN PELVIS WITH CONTRAST LOCATION: [facility] DATE: 12/25/2023 INDICATION: recent sma stent placement with bright red blood per rectum COMPARISON: 12/18/2023 TECHNIQUE: CT angiogram abdomen pelvis during arterial phase of injection of IV contrast. 2D and 3D MIP reconstructions were performed by the CT technologist. Dose reduction techniques were used. CONTRAST: 65 mL Isovue 370 FINDINGS: ANGIOGRAM ABDOMEN/PELVIS: Moderate aortoiliac atherosclerotic calcification, unchanged. An infrarenal abdominal aortic aneurysm measuring 4.4 cm AP by 4.3 cm transverse is not substantially changed. Associated thrombus formation, however, has increased from recent study. Segmental moderate narrowing of the proximal left common iliac artery redemonstrated, unchanged. Right internal iliac artery is proximally occluded with distal reconstitution. A new stent extending from the aortic lumen to the proximal  SMA is patent. 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 No active contrast extravasation identified. LOWER CHEST: Moderate left pleural effusion has increased in size. Trace right pleural effusion is new. Atelectasis seen in the left lung base. No confluent airspace consolidations. HEPATOBILIARY: Normal. A 1.5 cm cyst again seen in the central liver, unchanged. PANCREAS: Normal. SPLEEN: Normal. ADRENAL GLANDS: Normal. KIDNEYS/BLADDER: Normal. 2 small to characterize cystic lesions in the right kidney statistically represents cysts  and do not require imaging workup. Gas within the bladder lumen is likely related to recent Andres instrumentation. Urinary bladder is otherwise unremarkable. BOWEL: No focal bowel wall thickening or obstruction. Appendix is normal. Small volume free fluid has increased from prior study. No intraperitoneal free air. LYMPH NODES: Normal. PELVIC ORGANS: Normal. MUSCULOSKELETAL: Diffuse body wall edema has increased from recent study. Multilevel mild and moderate degenerative disc space narrowing seen in the visualized thoracolumbar spine most advanced at L4/L5. No suspicious osseous lesions or acute fractures. Small bilateral fat-containing inguinal hernias noted.     IMPRESSION: 1.  Interval placement of stent in the SMA, patent. 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 2.  No active contrast extravasation to indicate acute hemorrhage. 3.  Stable 4.4 cm infrarenal abdominal aortic aneurysm with increased associated thrombus formation. 4.  Increased small right/moderate left pleural effusions, small volume ascites and generalized body wall edema.    IR OR Angiogram    Result Date: 2023  This exam was marked as non-reportable because it will not be read by a radiologist or a [facility] non-radiologist provider.     Echocardiogram Complete    Result Date: 2023  [id number]^MIKAEL^DOMINIC^NANCY  [facility] [address] [place] [address]  Name: LORI PETIT MRN: [id number] : 1945 Study Date: 2023 03:40 PM Age: 78 yrs Gender: Female Patient Location: [facility] Reason For Study: Atrial Fibrillation Ordering Physician: DOMINIC YOUSSEF Performed By: Gurmeet Hill  BSA: 1.5 m2 Height: 66 in Weight: 101 lb HR: 76 BP: 148/63 mmHg ______________________________________________________________________________ Procedure Complete Echo Adult. Optison (NDC #[id number]) given  intravenously. ______________________________________________________________________________ Interpretation Summary  1. The left ventricle is normal in size. The visual ejection fraction is estimated at 55%. 2. The right ventricle is normal in structure, function and size. 3. There is moderate (2+) aortic regurgitation. 4. Ascending aorta dilatation is present. 5.6cm.  Echo 1/2023 from [facility]: EF 60%, 4+ AI, mild AS, aorta 5.7cm. ______________________________________________________________________________ Left Ventricle The left ventricle is normal in size. There is normal left ventricular wall thickness. The visual ejection fraction is estimated at 55%. Grade I or early diastolic dysfunction. Normal left ventricular wall motion.  Right Ventricle The right ventricle is normal in structure, function and size.  Atria The left atrium is mildly dilated. The right atrium is mild to moderately dilated. There is no atrial shunt seen.  Mitral Valve There is no mitral regurgitation noted.  Tricuspid Valve There is mild (1+) tricuspid regurgitation.  Aortic Valve There is mild trileaflet aortic sclerosis. There is moderate (2+) aortic regurgitation. No aortic stenosis is present.  Pulmonic Valve The pulmonic valve is normal in structure and function.  Vessels Ascending aorta dilatation is present. Dilation of the inferior vena cava is present with abnormal respiratory variation in diameter.  Pericardium There is no pericardial effusion.  Rhythm Sinus rhythm was noted. ______________________________________________________________________________ MMode/2D Measurements & Calculations  IVSd: 0.90 cm LVIDd: 4.6 cm LVIDs: 3.6 cm LVPWd: 0.84 cm FS: 21.1 % LV mass(C)d: 131.8 grams LV mass(C)dI: 88.1 grams/m2 Ao root diam: 3.5 cm LA dimension: 3.3 cm asc Aorta Diam: 5.6 cm LA/Ao: 0.95 LVOT diam: 2.0 cm LVOT area: 3.2 cm2 Ao root diam index Ht(cm/m): 2.1 Ao root diam index BSA (cm/m2): 2.3 Asc Ao diam index BSA (cm/m2): 3.8 Asc Ao  diam index Ht(cm/m): 3.4 RV Base: 3.4 cm RWT: 0.37 TAPSE: 2.2 cm  Doppler Measurements & Calculations MV E max arnulfo: 125.0 cm/sec MV A max arnulfo: 66.3 cm/sec MV E/A: 1.9 MV dec time: 0.16 sec Ao V2 max: 250.0 cm/sec Ao max P.0 mmHg Ao V2 mean: 173.0 cm/sec Ao mean P.0 mmHg Ao V2 VTI: 42.6 cm CY(I,D): 2.1 cm2 CY(V,D): 1.8 cm2 AI P1/2t: 457.4 msec LV V1 max P.0 mmHg LV V1 max: 141.0 cm/sec LV V1 VTI: 28.1 cm SV(LVOT): 90.0 ml SI(LVOT): 60.2 ml/m2 PA acc time: 0.14 sec TR max arnulfo: 286.3 cm/sec TR max P.9 mmHg AV Arnulfo Ratio (DI): 0.56 CY Index (cm2/m2): 1.4 E/E' av.9 Lateral E/e': 15.7 Medial E/e': 28.0 RV S Arnulfo: 16.4 cm/sec  ______________________________________________________________________________ Report approved by: Farideh Alvarado 2023 04:43 PM       CTA Abdomen Pelvis with Contrast    Result Date: 2023  EXAM: CTA ABDOMEN PELVIS WITH CONTRAST LOCATION: [facility] DATE: 2023 INDICATION:Status post abdominal surgery with post op hematoma. Hgb from 11 to 5. Also fell and injured left ribs, eval for rib fx and PE COMPARISON: CT chest 2023. TECHNIQUE: CT angiogram abdomen pelvis during arterial phase of injection of IV contrast. 2D and 3D MIP reconstructions were performed by the CT technologist. Dose reduction techniques were used. CONTRAST: 72mL Isovue 370 FINDINGS: ANGIOGRAM ABDOMEN/PELVIS: Infrarenal abdominal aortic aneurysm (4.6 cm) with anterior mural thrombus. No significant surrounding inflammation. Severe atherosclerotic disease with episodic mild to moderate narrowing. Severe right internal iliac artery narrowing. Severe celiac artery origin narrowing. Superior mesenteric and bilateral renal arteries are patent. Inferior mesenteric artery not well visualized. Descending thoracic aortic aneurysm (4 cm). LOWER CHEST: Small left pleural effusion with adjacent atelectasis. HEPATOBILIARY: Right hepatic lobe cyst. No suspicious  liver lesion. Gallbladder is unremarkable. PANCREAS: Normal. SPLEEN: Normal. ADRENAL GLANDS: Normal. KIDNEYS/BLADDER: Normal. BOWEL: Normal. LYMPH NODES/PERITONEUM: No enlarged lymph node. Trace abdominopelvic ascites. No large hematoma or evidence of active bleeding. PELVIC ORGANS: Uterus is present. No adnexal mass. 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 MUSCULOSKELETAL: Ventral abdominal surgical changes. Tiny lower anterior abdominal wall fluid collection (1.4 cm). No evidence of active bleeding.     IMPRESSION: 1.  Trace abdominopelvic ascites. No large hematoma or evidence of active bleeding within the abdomen or pelvis. 2.  Nearly occlusive left gonadal vein thrombus. 3.  Tiny inferoanterior abdominal wall fluid collection (1.4 cm), likely seroma. 4.  Infrarenal abdominal aortic aneurysm (4.6 cm) with anterior mural thrombus. 5.  Severe celiac artery origin narrowing. 6.  Small left pleural effusion. Findings were discussed with Dr. Ozuna on 12/18/2023 at 7:23 PM    Chest CT w/o contrast    Result Date: 12/18/2023  EXAM: CT CHEST W/O CONTRAST LOCATION: [facility] DATE: 12/18/2023 INDICATION: Eval for rib. COMPARISON: None. TECHNIQUE: CT chest without IV contrast. Multiplanar reformats were obtained. Dose reduction techniques were used. CONTRAST: None. FINDINGS: LUNGS AND PLEURA: There are minimal changes of centrilobular emphysema seen in the upper lobes bilaterally. Minimal scattered blebs in both lungs. Slight bilateral apical pleural scarring. Minimal scattered noncalcified pulmonary nodules in both lungs with the largest seen in the left lower lobe measuring 4.9 mm in greatest dimension. Mild left pleural effusion which appears dependent in nature. MEDIASTINUM/AXILLAE: Heavily calcified thoracic aorta with aneurysmal dilatation of both the ascending and descending thoracic aorta. The ascending thoracic aorta measures approximately 6.2 cm in greatest radial  dimension and the descending thoracic aorta 4.2 cm. There is no evidence for a rupture of the thoracic aorta. The left thyroid lobe is not seen and presumed atrophic or surgically absent. CORONARY ARTERY CALCIFICATION: Moderate. UPPER ABDOMEN: No significant finding. MUSCULOSKELETAL: Moderate scattered hypertrophic changes in the spine.     IMPRESSION: 1.  No inflammatory infiltrates. 2.  Mild scattered noncalcified/indeterminate pulmonary nodules with the largest seen measuring 4.9 mm in the left lower lobe. Please refer to below reference for possible follow-up. 3. Mild left pleural effusion which appears dependent in nature. 4. Heavily calcified thoracic aortic aneurysm with the ascending thoracic aorta measuring approximately 6.2 cm in greatest radial dimension and the descending thoracic aorta 4.2 cm. REFERENCE: Guidelines for Management of Incidental Pulmonary Nodules Detected on CT Images: From the Fleischner Society 2017. Guidelines apply to incidental nodules in patients who are 35 years or older. Guidelines do not apply to lung cancer screening, patients with immunosuppression, or patients with known primary cancer. MULTIPLE NODULES Nodule size <6 mm Low-risk patients: No follow-up needed. High-risk patients: Optional follow-up at 12 months. Nodule size 6 mm or larger Low-risk patients: Follow-up CT at 3-6 months, then consider CT at 18-24 months. High-risk patients: Follow-up CT at 3-6 months, then at 18-24 months if no change. -Use most suspicious nodule as guide to management.    LABS:      Sodium   Date Value Ref Range Status   12/26/2023 139 135 - 145 mmol/L Final     Comment:     Reference intervals for this test were updated on 09/26/2023 to more accurately reflect our healthy population. There may be differences in the flagging of prior results with similar values performed with this method. Interpretation of those prior results can be made in the context of the updated reference intervals.     12/25/2023 135 135 - 145 mmol/L Final     Comment:     Reference intervals for this test were updated on 09/26/2023 to more accurately reflect our healthy population. There may be differences in the flagging of prior results with similar values performed with this method. Interpretation of those prior results can be made in the context of the updated reference intervals.    12/25/2023 135 135 - 145 mmol/L Final     Comment:     Reference intervals for this test were updated on 09/26/2023 to more accurately reflect our healthy population. There may be differences in the flagging of prior results with similar values performed with this method. Interpretation of those prior results can be made in the context of the updated reference intervals.      Sodium POCT   Date Value Ref Range Status   12/25/2023 133 (L) 135 - 145 mmol/L Final     Urea Nitrogen   Date Value Ref Range Status   12/26/2023 14.8 8.0 - 23.0 mg/dL Final   12/25/2023 25.7 (H) 8.0 - 23.0 mg/dL Final   12/25/2023 26.2 (H) 8.0 - 23.0 mg/dL Final     Hemoglobin   Date Value Ref Range Status   12/28/2023 9.5 (L) 11.7 - 15.7 g/dL Final   12/27/2023 9.5 (L) 11.7 - 15.7 g/dL Final   12/27/2023 9.3 (L) 11.7 - 15.7 g/dL Final     Platelet Count   Date Value Ref Range Status   12/28/2023 405 150 - 450 10e3/uL Final   12/26/2023 321 150 - 450 10e3/uL Final   12/25/2023 257 150 - 450 10e3/uL Final     INR   Date Value Ref Range Status   12/25/2023 1.52 (H) 0.85 - 1.15 Final   12/20/2023 1.09 0.85 - 1.15 Final       30 minutes spent on the day of encounter doing chart review, history and exam, documentation, and further activities as noted.     Shelbi Cheng NP  VASCULAR SURGERY

## 2024-01-11 ENCOUNTER — TELEPHONE (OUTPATIENT)
Dept: OTHER | Facility: CLINIC | Age: 79
End: 2024-01-11
Payer: MEDICARE

## 2024-01-11 NOTE — TELEPHONE ENCOUNTER
Patient needs to be scheduled for mesenteric artery US and in person follow up with Dr. Parekh in 2 weeks.    Appt note: Hx of SMA stenting for ischemic ulcer of the gastric antrum and duodenum on 12/21/2023. 2 week follow up to 1/10/24 with Shelbi. Mesenteric US prior    Nancy RIVERA, RN    [facility]  Office: [phone]  Fax: [phone]

## 2024-01-12 NOTE — TELEPHONE ENCOUNTER
Future Appointments   Date Time Provider Department Center   1/25/2024 10:30 AM [id number]   1/25/2024 11:30 AM Abner Parekh MD [facility]

## 2024-01-24 NOTE — PROGRESS NOTES
[facility]    Tammy Osorio returns for vascular follow-up.  History of chronic mesenteric ischemia with ischemic ulcer at the gastric antrum (Hgb= 5.0).  SMA stenting performed 12/21/2023 by Dr. Mirza.  Known occluded celiac artery.    Patient actually presented to the hospital with anemia.  She denies any classic history of postprandial abdominal discomfort.  On workup she was found to have the occluded origin celiac artery with good collateralization and a high-grade stenosis of the SMA which led to the stenting.    She was readmitted 12/25/2023 with bleeding requiring transfusion(Hgb= 3.5).  EGD with bleeding duodenal ulcer treated.  Plavix held with continuing aspirin.  Protonix 40 mg twice daily for minimum 2 months.  Discharged 12/28/2020 with reinitiation ASA/Plavix.      PMH: Medications: Lopressor, Norvasc, Zoloft, Protonix, Lipitor, Plavix, aspirin    -- 11/27/2023: Laparotomy with BSO by GYN.      She has a known distal thoracic and infrarenal AAA.  Most recently measuring 4.4 cm on the postprocedure CTA which is unchanged over the last 4 to 5 years.    ROS: Initiated on oral iron 5 to 6 days ago.  Denies any bloody stools but hemoglobin earlier this week with primary care was 6.7.  They wanted this rechecked.    Exam: Alert and appropriate.  Normal affect.  Ambulatory.  Here with her .   Blood pressure 127/51 right arm.  140 and 52 left arm.  Pulse 64 regular    Petite with weight= 55.8 kg and BMI= 16.4 kg per square   Chest= clear   Abdomen= soft, nontender      Ultrasound was performed.  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  Widely patent stent with a minimal diameter of 5.8 mm and mid stent.  Good flow through the stent.  Retrograde flow in the branches of the celiac artery.        I reviewed the angiogram stent placement images with the patient.  Also the postprocedure CTA showing a widely patent stent and collateral filling of the hepatic  and splenic branches of the celiac artery which is occluded in its origin.    IMPRESSION:   #1.  Widely patent celiac stent.  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    #2.  History of significant gastric and duodenal ulcers.  Hemoglobin has been dropping with no obvious symptoms of GI bleeding.  On Protonix 40 mg twice daily for minimum of 2 months.  We cannot draw the hemoglobin here but primary care should have this done to see if repeat endoscopy is indicated.    #3.  I do not feel there is any direct correlation between the celiac/SMA disease and the gastric/duodenal ulcers.  However, antiplatelet therapy could certainly be contributing to this.  There is data in the cardiac literature that the Plavix has a much lower bleeding risk than aspirin.  Thus we will discontinue the aspirin and keep on Plavix.  With a very high flow in the SMA through the stent even off antiplatelet therapy this should remain patent and if necessary Plavix can also be held and this was discussed with patient and .    Will plan repeat SMA duplex in 6 months.    25 minutes with patient and  today including chart review.      Abner Parekh MD  This note was created using Dragon voice recognition software which may result in transcription errors.     CC: Dr. Heriberto Jackson

## 2024-01-25 ENCOUNTER — OFFICE VISIT (OUTPATIENT)
Dept: OTHER | Facility: CLINIC | Age: 79
End: 2024-01-25
Payer: MEDICARE

## 2024-01-25 ENCOUNTER — HOSPITAL ENCOUNTER (OUTPATIENT)
Dept: ULTRASOUND IMAGING | Facility: CLINIC | Age: 79
Discharge: HOME | End: 2024-01-25
Payer: MEDICARE

## 2024-01-25 VITALS — DIASTOLIC BLOOD PRESSURE: 52 MMHG | HEART RATE: 64 BPM | SYSTOLIC BLOOD PRESSURE: 140 MMHG

## 2024-01-25 DIAGNOSIS — K26.4 GASTROINTESTINAL HEMORRHAGE ASSOCIATED WITH DUODENAL ULCER: ICD-10-CM

## 2024-01-25 DIAGNOSIS — I70.8 OCCLUSION OF CELIAC ARTERY: ICD-10-CM

## 2024-01-25 DIAGNOSIS — K55.1 SUPERIOR MESENTERIC ARTERY STENOSIS (H): Primary | ICD-10-CM

## 2024-01-25 DIAGNOSIS — K55.1 MESENTERIC ANGINA (H): ICD-10-CM

## 2024-01-25 DIAGNOSIS — K55.9 MESENTERIC ISCHEMIA (H): ICD-10-CM

## 2024-01-25 PROCEDURE — 99213 OFFICE O/P EST LOW 20 MIN: CPT | Performed by: SURGERY

## 2024-01-25 PROCEDURE — 93975 VASCULAR STUDY: CPT

## 2024-01-25 PROCEDURE — G0463 HOSPITAL OUTPT CLINIC VISIT: HCPCS | Mod: 25 | Performed by: SURGERY

## 2024-01-25 RX ORDER — FERROUS SULFATE 325(65) MG
325 TABLET ORAL
COMMUNITY

## 2024-01-25 NOTE — PROGRESS NOTES
[facility]        Patient is here for a consult.    Pt is currently taking Aspirin, Statin, and Plavix.    BP (!) 140/52 (BP Location: Left arm, Patient Position: Chair, Cuff Size: Child)   Pulse 64     The provider has been notified that the patient has concerns of HGB level low patient would order to ckeck blood level..     Questions patient would like addressed today are: N/A.    Refills are needed: N/A    Has homecare services and agency name:  Ellen Mendoza MA

## 2024-05-16 ENCOUNTER — HOSPITAL ENCOUNTER (OUTPATIENT)
Facility: CLINIC | Age: 79
Discharge: HOME | End: 2024-05-16
Attending: INTERNAL MEDICINE | Admitting: INTERNAL MEDICINE
Payer: MEDICARE

## 2024-05-16 VITALS
HEIGHT: 66 IN | BODY MASS INDEX: 17.68 KG/M2 | HEART RATE: 79 BPM | WEIGHT: 110 LBS | DIASTOLIC BLOOD PRESSURE: 58 MMHG | OXYGEN SATURATION: 97 % | RESPIRATION RATE: 22 BRPM | SYSTOLIC BLOOD PRESSURE: 143 MMHG

## 2024-05-16 LAB — UPPER GI ENDOSCOPY: NORMAL

## 2024-05-16 PROCEDURE — 999N000099 HC STATISTIC MODERATE SEDATION < 10 MIN: Performed by: INTERNAL MEDICINE

## 2024-05-16 PROCEDURE — 43235 EGD DIAGNOSTIC BRUSH WASH: CPT | Performed by: INTERNAL MEDICINE

## 2024-05-16 PROCEDURE — 250N000009 HC RX 250: Performed by: INTERNAL MEDICINE

## 2024-05-16 PROCEDURE — 250N000011 HC RX IP 250 OP 636: Performed by: INTERNAL MEDICINE

## 2024-05-16 RX ORDER — FENTANYL CITRATE 50 UG/ML
INJECTION, SOLUTION INTRAMUSCULAR; INTRAVENOUS PRN
Status: DISCONTINUED | OUTPATIENT
Start: 2024-05-16 | End: 2024-05-16 | Stop reason: HOSPADM

## 2024-05-16 RX ORDER — GLIMEPIRIDE 1 MG/1
TABLET ORAL
COMMUNITY
Start: 2024-03-27

## 2024-05-16 ASSESSMENT — ACTIVITIES OF DAILY LIVING (ADL): ADLS_ACUITY_SCORE: 38

## (undated) DEVICE — DRSG TELFA ISLAND 4X14" 7544

## (undated) DEVICE — LINEN TOWEL PACK X5 5464

## (undated) DEVICE — SU PDS II 1 TP-1 48" Z880G

## (undated) DEVICE — DRAPE LEGGINGS 8421

## (undated) DEVICE — COVER LIGHT HANDLE  HILL-ROM C LT-C02

## (undated) DEVICE — LINEN GOWN OVERSIZE 5408

## (undated) DEVICE — DRAPE IOBAN INCISE 36X23" 6651EZ

## (undated) DEVICE — RAD G/W INQWIRE .035X260CM J-TIP EXCHANGE IQ35F260J1O5RS

## (undated) DEVICE — RAD INTRODUCER KIT MICRO 5FRX10CM .018 NITINOL G/W

## (undated) DEVICE — PREP CHLORAPREP 26ML TINTED HI-LITE ORANGE 930815

## (undated) DEVICE — STOPCOCK HIGH PRESSURE 3-WAY

## (undated) DEVICE — DRAPE SHEET REV FOLD 3/4 9349

## (undated) DEVICE — COVER C-ARM BAG SNAP 30X30" LF 01-3030

## (undated) DEVICE — DRAPE MAYO STAND 23X54 8337

## (undated) DEVICE — ESU PENCIL W/SMOKE EVAC NEPTUNE STRYKER 0703-046-000

## (undated) DEVICE — VESSEL LOOPS RED MAXI

## (undated) DEVICE — PACK AAA SBA15AAFS3

## (undated) DEVICE — BARRIER SEPRAFILM 5X6" SINGLE SHEET 4301-02

## (undated) DEVICE — BLADE KNIFE SURG 10 371110

## (undated) DEVICE — RAD RX ISOVUE 300 (50ML) 61% IOPAMIDOL CHARGE PER ML

## (undated) DEVICE — INTRO SHEATH 5FRX10CM PINNACLE MARKER RSB512

## (undated) DEVICE — DRSG TELFA ISLAND 4X10"

## (undated) DEVICE — DRAPE C-ARM 60X42" 1013

## (undated) DEVICE — CATH TRAY FOLEY SURESTEP 16FR WDRAIN BAG STLK LATEX A300316A

## (undated) DEVICE — SU PROLENE 6-0 RB-2DA 30" 8711H

## (undated) DEVICE — SYR 10ML SLIP TIP W/O NDL

## (undated) DEVICE — CLIP ETHICON LIGACLIP MED WHITE LT200

## (undated) DEVICE — PACK SPECIAL PROCEDURE CUSTOM

## (undated) DEVICE — SU PROLENE 6-0 C-1DA 30" M8706

## (undated) DEVICE — SU SILK 3-0 TIE 24" SA74H

## (undated) DEVICE — SU MONOCRYL 4-0 PS-2 18" UND Y496G

## (undated) DEVICE — CLIP ETHICON LIGACLIP SM BLUE LT100

## (undated) DEVICE — SU SILK 2-0 TIE 24" SA75H

## (undated) DEVICE — SOL WATER IRRIG 1000ML BOTTLE 2F7114

## (undated) DEVICE — SPONGE KITTNER 31001010

## (undated) DEVICE — NDL 19GA 1.5"

## (undated) DEVICE — SU VICRYL 3-0 SH 27" J316H

## (undated) DEVICE — GLOVE BIOGEL PI MICRO INDICATOR UNDERGLOVE SZ 6.5 48965

## (undated) DEVICE — ESU LIGASURE IMPACT OPEN SEALER/DVDR CVD LG JAW LF4418

## (undated) DEVICE — DEVICE MULTI TORQUE TD01

## (undated) DEVICE — BLADE CLIPPER 4406

## (undated) DEVICE — RAD INFLATOR BASIC COMPAK  IN4130

## (undated) DEVICE — WIPES FOLEY CARE SURESTEP PROVON DFC100

## (undated) DEVICE — DRAPE CV SPLIT II 147X106" 9158

## (undated) DEVICE — ESU GROUND PAD UNIVERSAL W/O CORD

## (undated) DEVICE — DECANTER BAG 2002S

## (undated) DEVICE — SU VICRYL 2-0 SH 27" J317H

## (undated) DEVICE — DRAPE C-ARMOR 5 SIDED 5523

## (undated) DEVICE — GLOVE BIOGEL PI MICRO SZ 6.5 48565

## (undated) DEVICE — GUIDEWIRE STR EXCHANGE .035X260CM TSF-35-260

## (undated) DEVICE — SPONGE RAY-TEC 4X4" 7317

## (undated) DEVICE — Device

## (undated) DEVICE — PREP SKIN SCRUB TRAY 4461A

## (undated) DEVICE — CATH ANGIO IMPRESS 5FRX100CM BERENSTEIN 510038BER

## (undated) DEVICE — SOL NACL 0.9% INJ 1000ML BAG 2B1324X

## (undated) DEVICE — CATH PIGTAILS W/MARKERS 5FR 100CM 7602-20M100SH

## (undated) DEVICE — SYR 10ML LL W/O NDL 302995

## (undated) DEVICE — SU PROLENE 5-0 RB-1DA 36"  8556H

## (undated) DEVICE — SYR 50ML LL W/O NDL 309653

## (undated) DEVICE — STPL SKIN 35W ROTATING HEAD PRW35

## (undated) DEVICE — GLOVE BIOGEL PI ULTRATOUCH G SZ 6.5 42165

## (undated) DEVICE — KIT ABDOMINAL HYST SMA15AHFSM

## (undated) RX ORDER — HYDRALAZINE HYDROCHLORIDE 20 MG/ML
INJECTION INTRAMUSCULAR; INTRAVENOUS
Status: DISPENSED
Start: 2023-11-27

## (undated) RX ORDER — FENTANYL CITRATE 50 UG/ML
INJECTION, SOLUTION INTRAMUSCULAR; INTRAVENOUS
Status: DISPENSED
Start: 2023-11-27

## (undated) RX ORDER — NEOSTIGMINE METHYLSULFATE 1 MG/ML
VIAL (ML) INJECTION
Status: DISPENSED
Start: 2023-12-21

## (undated) RX ORDER — GLYCOPYRROLATE 0.2 MG/ML
INJECTION, SOLUTION INTRAMUSCULAR; INTRAVENOUS
Status: DISPENSED
Start: 2023-11-27

## (undated) RX ORDER — EPINEPHRINE 0.1 MG/ML
INJECTION INTRAVENOUS
Status: DISPENSED
Start: 2023-12-25

## (undated) RX ORDER — ONDANSETRON 2 MG/ML
INJECTION INTRAMUSCULAR; INTRAVENOUS
Status: DISPENSED
Start: 2023-12-21

## (undated) RX ORDER — CEFAZOLIN SODIUM/WATER 2 G/20 ML
SYRINGE (ML) INTRAVENOUS
Status: DISPENSED
Start: 2023-11-27

## (undated) RX ORDER — FENTANYL CITRATE 50 UG/ML
INJECTION, SOLUTION INTRAMUSCULAR; INTRAVENOUS
Status: DISPENSED
Start: 2024-05-16

## (undated) RX ORDER — PROPOFOL 10 MG/ML
INJECTION, EMULSION INTRAVENOUS
Status: DISPENSED
Start: 2023-11-27

## (undated) RX ORDER — ACETAMINOPHEN 325 MG/1
TABLET ORAL
Status: DISPENSED
Start: 2023-11-27

## (undated) RX ORDER — METRONIDAZOLE 500 MG/100ML
INJECTION, SOLUTION INTRAVENOUS
Status: DISPENSED
Start: 2023-11-27

## (undated) RX ORDER — GLYCOPYRROLATE 0.2 MG/ML
INJECTION, SOLUTION INTRAMUSCULAR; INTRAVENOUS
Status: DISPENSED
Start: 2023-12-21

## (undated) RX ORDER — ONDANSETRON 2 MG/ML
INJECTION INTRAMUSCULAR; INTRAVENOUS
Status: DISPENSED
Start: 2023-11-27

## (undated) RX ORDER — HEPARIN SODIUM 1000 [USP'U]/ML
INJECTION, SOLUTION INTRAVENOUS; SUBCUTANEOUS
Status: DISPENSED
Start: 2023-12-21

## (undated) RX ORDER — DEXAMETHASONE SODIUM PHOSPHATE 4 MG/ML
INJECTION, SOLUTION INTRA-ARTICULAR; INTRALESIONAL; INTRAMUSCULAR; INTRAVENOUS; SOFT TISSUE
Status: DISPENSED
Start: 2023-12-21

## (undated) RX ORDER — FENTANYL CITRATE 50 UG/ML
INJECTION, SOLUTION INTRAMUSCULAR; INTRAVENOUS
Status: DISPENSED
Start: 2023-12-21

## (undated) RX ORDER — FENTANYL CITRATE 50 UG/ML
INJECTION, SOLUTION INTRAMUSCULAR; INTRAVENOUS
Status: DISPENSED
Start: 2023-12-19

## (undated) RX ORDER — BUPIVACAINE HYDROCHLORIDE 5 MG/ML
INJECTION, SOLUTION EPIDURAL; INTRACAUDAL
Status: DISPENSED
Start: 2023-12-21

## (undated) RX ORDER — CEFAZOLIN SODIUM/WATER 2 G/20 ML
SYRINGE (ML) INTRAVENOUS
Status: DISPENSED
Start: 2023-12-21